# Patient Record
Sex: FEMALE | Race: WHITE | NOT HISPANIC OR LATINO | Employment: OTHER | URBAN - NONMETROPOLITAN AREA
[De-identification: names, ages, dates, MRNs, and addresses within clinical notes are randomized per-mention and may not be internally consistent; named-entity substitution may affect disease eponyms.]

---

## 2019-01-07 ENCOUNTER — HOSPITAL ENCOUNTER (EMERGENCY)
Facility: HOSPITAL | Age: 59
Discharge: HOME | End: 2019-01-07
Attending: EMERGENCY MEDICINE | Admitting: EMERGENCY MEDICINE

## 2019-01-07 VITALS
DIASTOLIC BLOOD PRESSURE: 69 MMHG | SYSTOLIC BLOOD PRESSURE: 108 MMHG | OXYGEN SATURATION: 97 % | BODY MASS INDEX: 31.5 KG/M2 | HEIGHT: 66 IN | RESPIRATION RATE: 14 BRPM | TEMPERATURE: 98.2 F | WEIGHT: 196 LBS | HEART RATE: 78 BPM

## 2019-01-07 DIAGNOSIS — M54.12 LEFT CERVICAL RADICULOPATHY: Primary | ICD-10-CM

## 2019-01-07 PROCEDURE — 63710000001 PREDNISONE PER 1 MG: Performed by: EMERGENCY MEDICINE

## 2019-01-07 PROCEDURE — 99283 EMERGENCY DEPT VISIT LOW MDM: CPT

## 2019-01-07 RX ORDER — HYDROCODONE BITARTRATE AND ACETAMINOPHEN 7.5; 325 MG/1; MG/1
1 TABLET ORAL ONCE
Status: COMPLETED | OUTPATIENT
Start: 2019-01-07 | End: 2019-01-07

## 2019-01-07 RX ORDER — PREDNISONE 20 MG/1
20 TABLET ORAL 2 TIMES DAILY
Qty: 14 TABLET | Refills: 0 | Status: SHIPPED | OUTPATIENT
Start: 2019-01-07 | End: 2019-01-07

## 2019-01-07 RX ORDER — HYDROCODONE BITARTRATE AND ACETAMINOPHEN 7.5; 325 MG/1; MG/1
1 TABLET ORAL EVERY 4 HOURS PRN
Qty: 15 TABLET | Refills: 0 | Status: SHIPPED | OUTPATIENT
Start: 2019-01-07 | End: 2019-01-07

## 2019-01-07 RX ORDER — PREDNISONE 20 MG/1
20 TABLET ORAL ONCE
Status: COMPLETED | OUTPATIENT
Start: 2019-01-07 | End: 2019-01-07

## 2019-01-07 RX ORDER — HYDROCODONE BITARTRATE AND ACETAMINOPHEN 7.5; 325 MG/1; MG/1
1 TABLET ORAL EVERY 4 HOURS PRN
Qty: 15 TABLET | Refills: 0 | Status: SHIPPED | OUTPATIENT
Start: 2019-01-07 | End: 2020-11-09

## 2019-01-07 RX ORDER — PREDNISONE 20 MG/1
20 TABLET ORAL 2 TIMES DAILY
Qty: 14 TABLET | Refills: 0 | Status: SHIPPED | OUTPATIENT
Start: 2019-01-07 | End: 2020-11-09

## 2019-01-07 RX ADMIN — PREDNISONE 20 MG: 20 TABLET ORAL at 21:09

## 2019-01-07 RX ADMIN — HYDROCODONE BITARTRATE AND ACETAMINOPHEN 1 TABLET: 7.5; 325 TABLET ORAL at 21:09

## 2019-01-08 NOTE — ED PROVIDER NOTES
Subjective   Patient c/o increasing pain in neck mostly along left side and going down left arm at times whenever she turns her neck.  Had MRI which showed bulging disk and referred to Dr. Quiñones but have not been given appointment yet for follow up so came tonight for relief.  Has had back surgery in past.        Neck Pain   Pain location:  L side  Quality:  Aching  Pain radiates to:  L shoulder and L arm  Pain severity:  Severe  Pain is:  Same all the time  Onset quality:  Gradual  Duration:  3 months  Timing:  Constant  Progression:  Worsening  Chronicity:  New  Context: not fall, not jumping from heights, not lifting a heavy object, not MCA, not MVC, not pedestrian accident and not recent injury    Relieved by:  Ice  Worsened by:  Twisting  Ineffective treatments:  None tried  Associated symptoms: no bladder incontinence, no bowel incontinence, no chest pain, no fever, no headaches, no leg pain, no numbness, no paresis, no photophobia, no syncope, no tingling, no visual change, no weakness and no weight loss    Risk factors: no hx of head and neck radiation, no hx of osteoporosis, no hx of spinal trauma, no recent epidural, no recent head injury and no recurrent falls        Review of Systems   Constitutional: Negative.  Negative for fever and weight loss.   HENT: Negative.    Eyes: Negative for photophobia.   Respiratory: Negative.    Cardiovascular: Negative.  Negative for chest pain and syncope.   Gastrointestinal: Negative.  Negative for bowel incontinence.   Genitourinary: Negative.  Negative for bladder incontinence.   Musculoskeletal: Positive for neck pain.   Neurological: Negative.  Negative for tingling, weakness, numbness and headaches.   Hematological: Negative.    Psychiatric/Behavioral: Negative.    All other systems reviewed and are negative.      No past medical history on file.    Allergies   Allergen Reactions   • Lisinopril Swelling   • Erythromycin Rash       No past surgical history on  file.    No family history on file.    Social History     Socioeconomic History   • Marital status:      Spouse name: Not on file   • Number of children: Not on file   • Years of education: Not on file   • Highest education level: Not on file           Objective   Physical Exam   Constitutional: She is oriented to person, place, and time. She appears well-developed and well-nourished.   HENT:   Head: Normocephalic and atraumatic.   Eyes: EOM are normal. Pupils are equal, round, and reactive to light.   Neck: Normal range of motion. Neck supple.   Tender along left side of neck.   Musculoskeletal: Normal range of motion.   Neurological: She is alert and oriented to person, place, and time.   Skin: Skin is warm and dry.   Psychiatric: She has a normal mood and affect. Her behavior is normal.   Nursing note and vitals reviewed.      Procedures           ED Course  ED Course as of Jan 07 2354   Mon Jan 07, 2019   2340 Told patient that her history is consistent with pinched nerve in her neck but there was nothing that we could do tonight to fix that except give her some temporary relief.  Stressed to her that ED could not be her pain management and to follow up with Dr. Quiñones's office to get appointment or with PCP for management until then.  Tried to Matt but was on manual process.  [TR]      ED Course User Index  [TR] Hunter Cottrell Jr., MD                  MDM  Number of Diagnoses or Management Options  Left cervical radiculopathy: established and worsening  Risk of Complications, Morbidity, and/or Mortality  Presenting problems: moderate  Diagnostic procedures: minimal  Management options: low    Patient Progress  Patient progress: stable        Final diagnoses:   Left cervical radiculopathy            Hunter Cottrell Jr., MD  01/07/19 4491

## 2019-03-07 ENCOUNTER — OFFICE VISIT (OUTPATIENT)
Dept: NEUROSURGERY | Facility: CLINIC | Age: 59
End: 2019-03-07

## 2019-03-07 ENCOUNTER — HOSPITAL ENCOUNTER (OUTPATIENT)
Dept: GENERAL RADIOLOGY | Facility: HOSPITAL | Age: 59
Discharge: HOME | End: 2019-03-07
Admitting: NURSE PRACTITIONER

## 2019-03-07 VITALS
WEIGHT: 195 LBS | DIASTOLIC BLOOD PRESSURE: 70 MMHG | HEIGHT: 67 IN | BODY MASS INDEX: 30.61 KG/M2 | SYSTOLIC BLOOD PRESSURE: 118 MMHG

## 2019-03-07 DIAGNOSIS — M25.512 ACUTE PAIN OF LEFT SHOULDER: ICD-10-CM

## 2019-03-07 DIAGNOSIS — M54.2 CERVICALGIA: ICD-10-CM

## 2019-03-07 DIAGNOSIS — E66.9 OBESITY (BMI 30.0-34.9): ICD-10-CM

## 2019-03-07 DIAGNOSIS — M54.2 CERVICALGIA: Primary | ICD-10-CM

## 2019-03-07 PROBLEM — E66.811 OBESITY (BMI 30.0-34.9): Status: ACTIVE | Noted: 2019-03-07

## 2019-03-07 PROCEDURE — 99204 OFFICE O/P NEW MOD 45 MIN: CPT | Performed by: NURSE PRACTITIONER

## 2019-03-07 PROCEDURE — 73030 X-RAY EXAM OF SHOULDER: CPT

## 2019-03-07 PROCEDURE — 72040 X-RAY EXAM NECK SPINE 2-3 VW: CPT

## 2019-03-07 RX ORDER — ATENOLOL 25 MG/1
50 TABLET ORAL
COMMUNITY
End: 2020-11-09

## 2019-03-07 RX ORDER — ATORVASTATIN CALCIUM 20 MG/1
20 TABLET, FILM COATED ORAL
COMMUNITY
End: 2020-11-09

## 2019-03-07 RX ORDER — METFORMIN HYDROCHLORIDE 500 MG/1
1000 TABLET, EXTENDED RELEASE ORAL 2 TIMES DAILY
COMMUNITY
End: 2021-08-09 | Stop reason: SDUPTHER

## 2019-03-07 RX ORDER — PREGABALIN 300 MG/1
300 CAPSULE ORAL 2 TIMES DAILY
COMMUNITY
End: 2021-05-10

## 2019-03-07 RX ORDER — TRAZODONE HYDROCHLORIDE 50 MG/1
150 TABLET ORAL
COMMUNITY
End: 2021-04-06 | Stop reason: SDUPTHER

## 2019-03-07 RX ORDER — OXYCODONE AND ACETAMINOPHEN 10; 325 MG/1; MG/1
1 TABLET ORAL EVERY 6 HOURS PRN
COMMUNITY

## 2019-03-07 RX ORDER — LIDOCAINE 50 MG/G
1 PATCH TOPICAL
COMMUNITY
End: 2020-11-09

## 2019-03-07 RX ORDER — METHOCARBAMOL 500 MG/1
750 TABLET, FILM COATED ORAL 4 TIMES DAILY
COMMUNITY
End: 2020-11-09

## 2019-03-07 RX ORDER — LOSARTAN POTASSIUM 50 MG/1
50 TABLET ORAL
COMMUNITY
End: 2020-11-09 | Stop reason: SDUPTHER

## 2019-03-07 RX ORDER — DULOXETIN HYDROCHLORIDE 60 MG/1
120 CAPSULE, DELAYED RELEASE ORAL
COMMUNITY

## 2019-03-07 NOTE — PATIENT INSTRUCTIONS
"DASH Eating Plan  DASH stands for \"Dietary Approaches to Stop Hypertension.\" The DASH eating plan is a healthy eating plan that has been shown to reduce high blood pressure (hypertension). It may also reduce your risk for type 2 diabetes, heart disease, and stroke. The DASH eating plan may also help with weight loss.  What are tips for following this plan?  General guidelines  · Avoid eating more than 2,300 mg (milligrams) of salt (sodium) a day. If you have hypertension, you may need to reduce your sodium intake to 1,500 mg a day.  · Limit alcohol intake to no more than 1 drink a day for nonpregnant women and 2 drinks a day for men. One drink equals 12 oz of beer, 5 oz of wine, or 1½ oz of hard liquor.  · Work with your health care provider to maintain a healthy body weight or to lose weight. Ask what an ideal weight is for you.  · Get at least 30 minutes of exercise that causes your heart to beat faster (aerobic exercise) most days of the week. Activities may include walking, swimming, or biking.  · Work with your health care provider or diet and nutrition specialist (dietitian) to adjust your eating plan to your individual calorie needs.  Reading food labels  · Check food labels for the amount of sodium per serving. Choose foods with less than 5 percent of the Daily Value of sodium. Generally, foods with less than 300 mg of sodium per serving fit into this eating plan.  · To find whole grains, look for the word \"whole\" as the first word in the ingredient list.  Shopping  · Buy products labeled as \"low-sodium\" or \"no salt added.\"  · Buy fresh foods. Avoid canned foods and premade or frozen meals.  Cooking  · Avoid adding salt when cooking. Use salt-free seasonings or herbs instead of table salt or sea salt. Check with your health care provider or pharmacist before using salt substitutes.  · Do not hayes foods. Cook foods using healthy methods such as baking, boiling, grilling, and broiling instead.  · Cook with " heart-healthy oils, such as olive, canola, soybean, or sunflower oil.  Meal planning    · Eat a balanced diet that includes:  ? 5 or more servings of fruits and vegetables each day. At each meal, try to fill half of your plate with fruits and vegetables.  ? Up to 6-8 servings of whole grains each day.  ? Less than 6 oz of lean meat, poultry, or fish each day. A 3-oz serving of meat is about the same size as a deck of cards. One egg equals 1 oz.  ? 2 servings of low-fat dairy each day.  ? A serving of nuts, seeds, or beans 5 times each week.  ? Heart-healthy fats. Healthy fats called Omega-3 fatty acids are found in foods such as flaxseeds and coldwater fish, like sardines, salmon, and mackerel.  · Limit how much you eat of the following:  ? Canned or prepackaged foods.  ? Food that is high in trans fat, such as fried foods.  ? Food that is high in saturated fat, such as fatty meat.  ? Sweets, desserts, sugary drinks, and other foods with added sugar.  ? Full-fat dairy products.  · Do not salt foods before eating.  · Try to eat at least 2 vegetarian meals each week.  · Eat more home-cooked food and less restaurant, buffet, and fast food.  · When eating at a restaurant, ask that your food be prepared with less salt or no salt, if possible.  What foods are recommended?  The items listed may not be a complete list. Talk with your dietitian about what dietary choices are best for you.  Grains  Whole-grain or whole-wheat bread. Whole-grain or whole-wheat pasta. Brown rice. Oatmeal. Quinoa. Bulgur. Whole-grain and low-sodium cereals. Trisha bread. Low-fat, low-sodium crackers. Whole-wheat flour tortillas.  Vegetables  Fresh or frozen vegetables (raw, steamed, roasted, or grilled). Low-sodium or reduced-sodium tomato and vegetable juice. Low-sodium or reduced-sodium tomato sauce and tomato paste. Low-sodium or reduced-sodium canned vegetables.  Fruits  All fresh, dried, or frozen fruit. Canned fruit in natural juice (without  added sugar).  Meat and other protein foods  Skinless chicken or turkey. Ground chicken or turkey. Pork with fat trimmed off. Fish and seafood. Egg whites. Dried beans, peas, or lentils. Unsalted nuts, nut butters, and seeds. Unsalted canned beans. Lean cuts of beef with fat trimmed off. Low-sodium, lean deli meat.  Dairy  Low-fat (1%) or fat-free (skim) milk. Fat-free, low-fat, or reduced-fat cheeses. Nonfat, low-sodium ricotta or cottage cheese. Low-fat or nonfat yogurt. Low-fat, low-sodium cheese.  Fats and oils  Soft margarine without trans fats. Vegetable oil. Low-fat, reduced-fat, or light mayonnaise and salad dressings (reduced-sodium). Canola, safflower, olive, soybean, and sunflower oils. Avocado.  Seasoning and other foods  Herbs. Spices. Seasoning mixes without salt. Unsalted popcorn and pretzels. Fat-free sweets.  What foods are not recommended?  The items listed may not be a complete list. Talk with your dietitian about what dietary choices are best for you.  Grains  Baked goods made with fat, such as croissants, muffins, or some breads. Dry pasta or rice meal packs.  Vegetables  Creamed or fried vegetables. Vegetables in a cheese sauce. Regular canned vegetables (not low-sodium or reduced-sodium). Regular canned tomato sauce and paste (not low-sodium or reduced-sodium). Regular tomato and vegetable juice (not low-sodium or reduced-sodium). Pickles. Olives.  Fruits  Canned fruit in a light or heavy syrup. Fried fruit. Fruit in cream or butter sauce.  Meat and other protein foods  Fatty cuts of meat. Ribs. Fried meat. Mcintyre. Sausage. Bologna and other processed lunch meats. Salami. Fatback. Hotdogs. Bratwurst. Salted nuts and seeds. Canned beans with added salt. Canned or smoked fish. Whole eggs or egg yolks. Chicken or turkey with skin.  Dairy  Whole or 2% milk, cream, and half-and-half. Whole or full-fat cream cheese. Whole-fat or sweetened yogurt. Full-fat cheese. Nondairy creamers. Whipped toppings.  Processed cheese and cheese spreads.  Fats and oils  Butter. Stick margarine. Lard. Shortening. Ghee. Mcintyre fat. Tropical oils, such as coconut, palm kernel, or palm oil.  Seasoning and other foods  Salted popcorn and pretzels. Onion salt, garlic salt, seasoned salt, table salt, and sea salt. Worcestershire sauce. Tartar sauce. Barbecue sauce. Teriyaki sauce. Soy sauce, including reduced-sodium. Steak sauce. Canned and packaged gravies. Fish sauce. Oyster sauce. Cocktail sauce. Horseradish that you find on the shelf. Ketchup. Mustard. Meat flavorings and tenderizers. Bouillon cubes. Hot sauce and Tabasco sauce. Premade or packaged marinades. Premade or packaged taco seasonings. Relishes. Regular salad dressings.  Where to find more information:  · National Heart, Lung, and Blood [place]: www.nhlbi.nih.gov  · American Heart Association: www.heart.org  Summary  · The DASH eating plan is a healthy eating plan that has been shown to reduce high blood pressure (hypertension). It may also reduce your risk for type 2 diabetes, heart disease, and stroke.  · With the DASH eating plan, you should limit salt (sodium) intake to 2,300 mg a day. If you have hypertension, you may need to reduce your sodium intake to 1,500 mg a day.  · When on the DASH eating plan, aim to eat more fresh fruits and vegetables, whole grains, lean proteins, low-fat dairy, and heart-healthy fats.  · Work with your health care provider or diet and nutrition specialist (dietitian) to adjust your eating plan to your individual calorie needs.  This information is not intended to replace advice given to you by your health care provider. Make sure you discuss any questions you have with your health care provider.  Document Released: 12/06/2012 Document Revised: 12/11/2017 Document Reviewed: 12/11/2017  [employer] Patient Education © 2018 [employer]

## 2019-03-07 NOTE — PROGRESS NOTES
Primary Care Provider: Paulie Rodriguez MD  Requesting Provider: Paulie Rodriguez MD    Chief Complaint:   Chief Complaint   Patient presents with   • Neck Pain   • Shoulder Pain     left     History of Present Illness  Consultation today at the request of Paulie Rodriguez MD     Soledad Ram is a 59 y.o.  female who presents today for a complaint of left neck/ trapezius and left shoulder pain.  Onset, October 2018, not related to any specific activity.  Denies injury.   Symptoms are currently intermittent in nature.  She described neck/ trapezius and shoulder discomfort as sharp non-radiating pain that worsens with left rotation and forward flexion, movement/ rotation of left arm at shoulder, and while lifting objects with left arm.  Alleviating factors include relaxation, Percocet and Robaxin which she obtains from Dr. Meier with pain management, and alternating applications of ice and heating pad.   She denies fevers, chills, night sweats, unexplained weight loss, paresthesias of the upper or lower extremities, burning dysesthesias, weakness of the left upper extremity, gait/ balance instabilities, saddle anesthesia, or bowel/ bladder dysfunction.  Currently rates severity of discomfort 6 / 10.  No additional concerns at this time.      Hx of prior L4/5 fusion in 2016 per Dr. Quiñones.  Ms. Ram has not completed a course of physician directed physical therapy.  Does not routinely engaged in chiropractic or massage therapy.   Routinely evaluated by Dr. Meier with pain management.      Review of Systems   Constitutional: Positive for activity change and fatigue.   HENT: Positive for ear pain and facial swelling.    Eyes: Positive for discharge and redness.   Respiratory: Negative.    Cardiovascular: Negative.    Gastrointestinal: Positive for constipation.   Endocrine: Positive for heat intolerance.   Genitourinary: Negative.    Musculoskeletal: Positive for back pain, neck pain and neck  "stiffness.   Skin: Negative.    Allergic/Immunologic: Negative.    Neurological: Positive for weakness and headaches.   Hematological: Negative.    Psychiatric/Behavioral: The patient is nervous/anxious.    All other systems reviewed and are negative.    Past Medical History:   Diagnosis Date   • Diabetes mellitus (CMS/HCC)    • Infection      Past Surgical History:   Procedure Laterality Date   • CARPAL TUNNEL RELEASE     • TONSILLECTOMY     • TUBAL ABDOMINAL LIGATION       Family History: family history is not on file.    Social History:  reports that  has never smoked. she has never used smokeless tobacco. She reports that she does not drink alcohol or use drugs.    Medications:    Current Outpatient Medications:   •  HYDROcodone-acetaminophen (NORCO) 7.5-325 MG per tablet, Take 1 tablet by mouth Every 4 (Four) Hours As Needed for Moderate Pain ., Disp: 15 tablet, Rfl: 0  •  predniSONE (DELTASONE) 20 MG tablet, Take 1 tablet by mouth 2 (Two) Times a Day., Disp: 14 tablet, Rfl: 0    Allergies:  Lisinopril and Erythromycin    Objective   /70   Ht 170.2 cm (67\")   Wt 88.5 kg (195 lb)   BMI 30.54 kg/m²   Physical Exam   Constitutional: She is oriented to person, place, and time. She appears well-developed and well-nourished.  Non-toxic appearance. She does not have a sickly appearance. She does not appear ill. No distress.   Eyes: EOM are normal. Pupils are equal, round, and reactive to light.   Musculoskeletal:        Left shoulder: She exhibits decreased range of motion, tenderness and pain. She exhibits no swelling, no crepitus and no deformity.   Tenderness with palpation to left trapezius and anterior left deltoid.  Decreased range of motion.  Positive drop arm test.  Positive empty can test.   Neurological: She is oriented to person, place, and time. Gait normal.   Reflex Scores:       Tricep reflexes are 2+ on the right side and 2+ on the left side.       Bicep reflexes are 2+ on the right side and 2+ " on the left side.       Brachioradialis reflexes are 2+ on the right side and 2+ on the left side.       Patellar reflexes are 2+ on the right side and 2+ on the left side.       Achilles reflexes are 2+ on the right side and 2+ on the left side.  Psychiatric: Her speech is normal.   Nursing note and vitals reviewed.    Neurologic Exam     Mental Status   Oriented to person, place, and time.   Attention: normal. Concentration: normal.   Speech: speech is normal   Level of consciousness: alert    Cranial Nerves     CN II   Visual fields full to confrontation.     CN III, IV, VI   Pupils are equal, round, and reactive to light.  Extraocular motions are normal.     CN V   Facial sensation intact.     CN VII   Facial expression full, symmetric.     CN VIII   CN VIII normal.     CN IX, X   CN IX normal.     CN XI   CN XI normal.     Motor Exam   Muscle bulk: normal  Overall muscle tone: normal  Right arm tone: normal  Left arm tone: normal  Right arm pronator drift: absent  Left arm pronator drift: absent  Right leg tone: normal  Left leg tone: normal    Strength   Right deltoid: 5/5  Left deltoid: 5/5  Right biceps: 5/5  Left biceps: 5/5  Right triceps: 5/5  Left triceps: 5/5  Right wrist extension: 5/5  Left wrist extension: 5/5  Right iliopsoas: 5/5  Left iliopsoas: 5/5  Right quadriceps: 5/5  Left quadriceps: 5/5  Right anterior tibial: 5/5  Left anterior tibial: 5/5  Right posterior tibial: 5/5  Left posterior tibial: 5/5    Sensory Exam   Light touch normal.     Gait, Coordination, and Reflexes     Gait  Gait: normal    Tremor   Resting tremor: absent  Intention tremor: absent  Action tremor: absent    Reflexes   Right brachioradialis: 2+  Left brachioradialis: 2+  Right biceps: 2+  Left biceps: 2+  Right triceps: 2+  Left triceps: 2+  Right patellar: 2+  Left patellar: 2+  Right achilles: 2+  Left achilles: 2+  Right : 2+  Left : 2+  Right plantar: normal  Left plantar: normal  Right Elmore: absent  Left  Elmore: absent  Right ankle clonus: absent  Left ankle clonus: absent  Right pendular knee jerk: absent  Left pendular knee jerk: absent    Oswestry Disability Index (Aparna et al, 1980)   Score   Pain Intensity 1   Personal Care 0   Lifting 4   Reading 2   Headaches 1   Concentration 0   Work 1   Driving 2   Sleeping 1   Recreation 2   TOTAL =  14     SCORE INTERPRETATION OF THE OSWESTRY NECK DISABILITY QUESTIONNAIRE  0-4: No disability  5-14: Mild disability   15-24: Moderate disability   25-34: Severe disability   >35: Complete disability    Imaging: (independent review and interpretation)                  ASSESSMENT and PLAN  Soledad Ram is a 59 y.o. female with a significant comorbidity of prior L4-5 fusion and obesity. She presents with a new problem of left neck/ trapezius and left shoulder pain. Physical exam findings of decreased range of motion the left shoulder, positive left drop arm, positive left empty can test, tenderness to palpation to left trapezius and left anterior deltoid.  Her imaging, x-ray of the cervical spine obtained October 27, 2018 shows no fractures, normal alignment, mild degenerative changes throughout.  MRI of the cervical spine obtained December 6, 2018 shows no acute fractures, normal alignment, degenerative changes throughout, C3-4: left foraminal stenosis due to facet hypertrophy, does not appear to show thecal sac or central canal stenosis.  C5-6: Small disc protrusion disc protrusion and facet hypertrophy, possible central canal and bilateral facet narrowing, however image quality is poor.  C6-7: Facet hypertrophy causing mild left foraminal narrowing, again possible central canal narrowing, poor image quality.      I would like to proceed today by obtaining x-rays of the cervical spine with flexion and extension to assess for cervical stability as well as x-rays of the left shoulder.  For first line conservative care of cervical neck pain, I would like to send Ms.  Leanna for a dedicated course of physician directed physical therapy consisting of 2-3 times a week for 3-4 weeks.  Nonsteroidal anti-inflammatories, Ibuprofen or naproxen sodium, and/or Tylenol per package instructions for pain.  B/R/AE and use discussed.  May continue pain medications per Dr. Meier's recommendations and and instructions.  In the interim I advised patient to follow-up with her PCP for potential referral to orthopedics for left shoulder pain.  Once all testing is complete we will follow-up with Ms. Ram and have  her see Dr. Aden for reassessment and to discuss the need for surgical intervention.  I advised the patient to call and return sooner for new or worsening complaints of weakness, paresthesias, gait disturbances, or any additional concerns.   BMI today is 30.5.  Information on the DASH diet provided in the AVS.  We will continue to provided diet and exercise information with the goal of weight loss at each scheduled appointment.  Treatment options discussed in detail with Soledad and she voiced understanding.  Mrs. Ram agrees with this plan of care.    Soledad was seen today for neck pain and shoulder pain.    Diagnoses and all orders for this visit:    Cervicalgia  -     XR spine cervical 2 vw; Future  -     Ambulatory Referral to Physical Therapy Evaluate and treat (3x a week for 3-4 weeks)    Acute pain of left shoulder  -     XR shoulder 2-3 vw left; Future  -     Ambulatory Referral to Physical Therapy Evaluate and treat (3x a week for 3-4 weeks)    Obesity (BMI 30.0-34.9)    Return in about 1 month (around 4/7/2019) for Next scheduled follow up with Don.    Thank you for this Consultation and the opportunity to participate in Soledad's care.    Sincerely,  Don Elizabeth, MARTY    Level of Risk: Moderate due to: undiagnosed new problem  MDM: Moderate Complexity  (Mod = 89385, High = 28523)

## 2019-03-20 ENCOUNTER — HOSPITAL ENCOUNTER (OUTPATIENT)
Dept: PHYSICAL THERAPY | Facility: HOSPITAL | Age: 59
Setting detail: THERAPIES SERIES
Discharge: HOME | End: 2019-03-20

## 2019-03-20 DIAGNOSIS — M54.2 CERVICALGIA: Primary | ICD-10-CM

## 2019-03-20 PROCEDURE — 97161 PT EVAL LOW COMPLEX 20 MIN: CPT | Performed by: PHYSICAL THERAPIST

## 2019-03-20 NOTE — THERAPY EVALUATION
Outpatient Physical Therapy Ortho Initial Evaluation  [facility]     Patient Name: Soledad Ram  : 1960  MRN: [id number]  Today's Date: 3/20/2019      Visit Date: 2019    Patient Active Problem List   Diagnosis   • Obesity (BMI 30.0-34.9)        Past Medical History:   Diagnosis Date   • Diabetes mellitus (CMS/HCC)    • Infection         Past Surgical History:   Procedure Laterality Date   • CARPAL TUNNEL RELEASE     • TONSILLECTOMY     • TUBAL ABDOMINAL LIGATION         Visit Dx:     ICD-10-CM ICD-9-CM   1. Cervicalgia M54.2 723.1         Patient History     Row Name 19 0930             History    Chief Complaint  Pain  -TB      Type of Pain  Neck pain left  -TB      Date Current Problem(s) Began  12/15/18  -TB      Brief Description of Current Complaint  She had an insidious onset of left neck pain going from her left neck to her shoulder. She has not had any injuries to her neck prior to this.   -TB      Patient/Caregiver Goals  Relieve pain;Return to prior level of function;Know what to do to help the symptoms  -TB      Patient's Rating of General Health  Good  -TB      Hand Dominance  right-handed  -TB      How has patient tried to help current problem?  heat/cold  -TB      What clinical tests have you had for this problem?  X-ray  -TB      Results of Clinical Tests  some degenerative changes in neck and shoulder but nothing acute or concerning  -TB         Pain     Pain Location  Neck  -TB      Pain at Present  5  -TB      Pain at Best  0  -TB      Pain at Worst  8  -TB      Pain Frequency  Intermittent 50% of the day  -TB      Pain Description  Stabbing  -TB      What Performance Factors Make the Current Problem(s) WORSE?  turning head  -TB      What Performance Factors Make the Current Problem(s) BETTER?  resting and reclining in neutral  -TB         Fall Risk Assessment    Any falls in the past year:  No  -TB         Services    Prior Rehab/Home Health Experiences  Yes  -TB       When was the prior experience with Rehab/Home Health  2016  -TB      Where was the prior experience with Rehab/Home Health  [facility]  -TB         Daily Activities    Primary Language  English  -TB      Are you able to read  Yes  -TB      Are you able to write  Yes  -TB      How does patient learn best?  Demonstration  -TB      Teaching needs identified  Home Exercise Program;Management of Condition  -TB      Patient is concerned about/has problems with  Performing home management (household chores, shopping, care of dependents);Difficulty with self care (i.e. bathing, dressing, toileting:  -TB      Does patient have problems with the following?  Depression;Anxiety  -TB      Barriers to learning  None  -TB      Pt Participated in POC and Goals  Yes  -TB         Safety    Are you being hurt, hit, or frightened by anyone at home or in your life?  No  -TB      Are you being neglected by a caregiver  No  -TB        User Key  (r) = Recorded By, (t) = Taken By, (c) = Cosigned By    Initials Name Provider Type    TB Vinnie Moreno, PT Physical Therapist          PT Ortho     Row Name 03/20/19 0900       Posture/Observations    Posture/Observations Comments  forward head posture with rounded shoulders  -TB       Quarter Clearing    Quarter Clearing  Upper Quarter Clearing  -TB       Sensory Screen for Light Touch- Upper Quarter Clearing    C4 (posterior shoulder)  Intact  -TB    C5 (lateral upper arm)  Intact  -TB    C6 (tip of thumb)  Intact  -TB    C7 (tip of 3rd finger)  Intact  -TB    C8 (tip of 5th finger)  Intact  -TB    T1 (medial lower arm)  Intact  -TB       Myotomal Screen- Upper Quarter Clearing    Shoulder flexion (C5)  Right:;WNL;Left:;4 (Good)  -TB    Elbow flexion/wrist extension (C6)  WNL  -TB    Elbow extension/wrist flexion (C7)  WNL  -TB    Finger flexion/ (C8)  WNL  -TB    Finger abduction (T1)  WNL  -TB      WNL  -TB       Cervical/Shoulder ROM Screen    Cervical flexion  Impaired  75%  -TB    Cervical extension  Impaired 25% with left pain  -TB    Cervical rotation  Impaired RR: 75% left pain; LR: 75% with left pain  -TB       Special Tests/Palpation    Special Tests/Palpation  Cervical/Thoracic;Shoulder  -TB       Cervical Palpation    Cervical Palpation- Location?  Occiput;AC joint;Upper traps;Suboccipital;Cervical facets;Levator scapula  -TB       Cervical Accessory Motions    Cervical Accessory Motions Tested?  Yes  -TB    Sideglide- C1  WNL  -TB    Sideglide- C2  WNL  -TB    Sideglide- C3  WNL  -TB    Sideglide- C4  Left:;Hypomobile;Left pain;Right:;WNL  -TB    Sideglide- C5  WNL  -TB    Sideglide- C6  WNL  -TB    Sideglide- C7  WNL  -TB       Thoracic Accessory Motions    Thoracic Accessory Motions Tested?  Yes  -TB    Pa glide- Upper thoracic  Hypomobile  -TB    Pa glide- Middle thoracic  Hypomobile  -TB       Rib Mobility    Rib Mobility Accessory Motions Tested?  Yes  -TB    Rib Mobility- T1  Right:;WNL;Left:;Hypomobile;Left pain  -TB       Cervical/Thoracic Special Tests    Spurlings (Foraminal Compression)  Left: painful, no radicular  -TB    Cervical Distraction (Foraminal Compression vs. Facet Pain)  Left: relieved pain  -TB       Shoulder Girdle Accessory Motions    Shoulder Girdle Accessory Motions Tested?  Yes  -TB    A-P glide of AC joint  Left pain  -TB       Shoulder Impingement/Rotator Cuff Special Tests    Quintanilla-Mark Test (RC Lesion vs. Bursitis)  Left:;Positive  -TB    Neer Impingement Test (RC Lesion vs. Bursitis)  Left:;Positive  -TB       General ROM    RT Upper Ext  Rt Shoulder Flexion  -TB    LT Upper Ext  Lt Shoulder Flexion  -TB       Right Upper Ext    Rt Shoulder Flexion AROM  150 deg  -TB       Left Upper Ext    Lt Shoulder Flexion AROM  130 deg with pain  -TB      User Key  (r) = Recorded By, (t) = Taken By, (c) = Cosigned By    Initials Name Provider Type    TB Vinnie Moreno, PT Physical Therapist                  [unfilled]    Therapy  Education  Education Details: gentle cervical rotation and posture  Given: HEP, Symptoms/condition management, Mobility training  Program: New  How Provided: Verbal, Demonstration  Provided to: Patient  Level of Understanding: Teach back education performed, Verbalized, Demonstrated     PT OP Goals     Row Name 03/20/19 0900          Long Term Goals    LTG Date to Achieve  05/01/19  -TB     LTG 1  Neck Disability Index <10%  -TB     LTG 1 Progress  New  -TB     LTG 2  Symmetrical saúl cervical rotation without impingement  -TB     LTG 2 Progress  New  -TB     LTG 3  Independent with HEP for posture and flexibility  -TB     LTG 3 Progress  New  -TB       User Key  (r) = Recorded By, (t) = Taken By, (c) = Cosigned By    Initials Name Provider Type    TB Vinnie Moreno, PT Physical Therapist          PT Assessment/Plan     Row Name 03/20/19 1000          PT Assessment    Functional Limitations  Limitation in home management;Limitations in functional capacity and performance;Performance in leisure activities  -TB     Impairments  Pain;Range of motion;Joint mobility  -TB     Assessment Comments  Her primary problem appears to be a stuck facet joint around C3/4. She also has some left shoulder pain which may have limited elevation putting more stress onto left neck. I anticipate she can do very well, especially if we can release the stuck facet quickly.   -TB     Please refer to paper survey for additional self-reported information  Yes  -TB     Rehab Potential  Excellent  -TB     Patient/caregiver participated in establishment of treatment plan and goals  Yes  -TB     Patient would benefit from skilled therapy intervention  Yes  -TB        PT Plan    PT Frequency  2x/week;3x/week  -TB     Predicted Duration of Therapy Intervention (Therapy Eval)  3-4 weeks  -TB     Planned CPT's?  PT EVAL LOW COMPLEXITY: 47364;PT THER PROC EA 15 MIN: 82912;PT MANUAL THERAPY EA 15 MIN: 41164;PT ELECTRICAL STIM UNATTEND: ;PT  ELECTRICAL STIM ATTD EA 15 MIN: 40028;PT ULTRASOUND EA 15 MIN: 68622  -TB     PT Plan Comments  Focus on manual therapy to relax guarded muscles on her left neck and improve gliding and closing of her left mid cervical facet. Some muscle energy may be used to help nudge the segment into moving again. We will progress then with a HEP for improved posture to try to prevent this from reoccuring.   -TB       User Key  (r) = Recorded By, (t) = Taken By, (c) = Cosigned By    Initials Name Provider Type    TB Vinnie Moreno, PT Physical Therapist                 Manual Rx (last 36 hours)      Manual Treatments     Row Name 03/20/19 0930             Manual Rx 1    Manual Rx 1 Location  cervical manual traction   -TB      Manual Rx 1 Type  in neutral and right SB  -TB      Manual Rx 1 Duration  10  -TB         Manual Rx 2    Manual Rx 2 Location  left C3/4 facet gapping mob  -TB      Manual Rx 2 Type  right SB with left rotation  -TB      Manual Rx 2 Grade  gentle repetitive  -TB         Manual Rx 3    Manual Rx 3 Location  manual SNAG for upgliding left mid cervical  -TB      Manual Rx 3 Type  with STM left mid cervical  -TB         Manual Rx 4    Manual Rx 4 Location  MET for left rotation mid cervical  -TB      Manual Rx 4 Type  left SB down to impingement  -TB      Manual Rx 4 Grade  manual resisted isometric right rotation with OP at C3  -TB      Manual Rx 4 Duration  felt a cavitation with this  -TB        User Key  (r) = Recorded By, (t) = Taken By, (c) = Cosigned By    Initials Name Provider Type    Vinnie Shaikh, PT Physical Therapist                      Outcome Measure Options: Neck Disability Index (NDI)  Neck Disability Index  Section 1 - Pain Intensity: The pain is moderate at the moment.  Section 2 - Personal Care: I can look after myself normally, but it causes extra pain.  Section 3 - Lifting: Pain prevents me from lifting heavy weights off the floor but I can manage if items are conveniently  positioned, ie. on a table.  Section 4 - Work: I can do most of my usual work, but no more  Section 5 - Headaches: I have moderate headaches that come infrequently.  Section 6 - Concentration: I can concentrate fully with slight difficulty.  Section 7 - Sleeping: My sleep is moderately disturbed for up to 2-3 hours.  Section 8 - Driving: I can drive as long as I want with moderate neck pain.  Section 9 - Reading: I can read as much as I want with moderate neck pain.  Section 10 - Recreation: I have some neck pain with a few recreational activities.  Neck Disability Index Score: 19      Time Calculation:     Start Time: 0930  Stop Time: 1030  Time Calculation (min): 60 min     Therapy Charges for Today     Code Description Service Date Service Provider Modifiers Qty    11830788303 HC PT EVAL LOW COMPLEXITY 4 3/20/2019 Vinnie Moreno, PT GP 1          PT G-Codes  Outcome Measure Options: Neck Disability Index (NDI)  Neck Disability Index Score: 19         Vinnie Moreno, PT  3/20/2019

## 2019-03-22 ENCOUNTER — HOSPITAL ENCOUNTER (OUTPATIENT)
Dept: PHYSICAL THERAPY | Facility: HOSPITAL | Age: 59
Setting detail: THERAPIES SERIES
Discharge: HOME | End: 2019-03-22

## 2019-03-22 DIAGNOSIS — M54.2 CERVICALGIA: Primary | ICD-10-CM

## 2019-03-22 PROCEDURE — 97140 MANUAL THERAPY 1/> REGIONS: CPT

## 2019-03-22 NOTE — THERAPY TREATMENT NOTE
Outpatient Physical Therapy Ortho Treatment Note  [facility]     Patient Name: Soledad Ram  : 1960  MRN: [id number]  Today's Date: 3/22/2019      Visit Date: 2019    Visit Dx:    ICD-10-CM ICD-9-CM   1. Cervicalgia M54.2 723.1       Patient Active Problem List   Diagnosis   • Obesity (BMI 30.0-34.9)        Past Medical History:   Diagnosis Date   • Diabetes mellitus (CMS/HCC)    • Infection         Past Surgical History:   Procedure Laterality Date   • CARPAL TUNNEL RELEASE     • TONSILLECTOMY     • TUBAL ABDOMINAL LIGATION         PT Ortho     Row Name 19 0900       Posture/Observations    Posture/Observations Comments  forward head posture with rounded shoulders  -TB       Quarter Clearing    Quarter Clearing  Upper Quarter Clearing  -TB       Sensory Screen for Light Touch- Upper Quarter Clearing    C4 (posterior shoulder)  Intact  -TB    C5 (lateral upper arm)  Intact  -TB    C6 (tip of thumb)  Intact  -TB    C7 (tip of 3rd finger)  Intact  -TB    C8 (tip of 5th finger)  Intact  -TB    T1 (medial lower arm)  Intact  -TB       Myotomal Screen- Upper Quarter Clearing    Shoulder flexion (C5)  Right:;WNL;Left:;4 (Good)  -TB    Elbow flexion/wrist extension (C6)  WNL  -TB    Elbow extension/wrist flexion (C7)  WNL  -TB    Finger flexion/ (C8)  WNL  -TB    Finger abduction (T1)  WNL  -TB      WNL  -TB       Cervical/Shoulder ROM Screen    Cervical flexion  Impaired 75%  -TB    Cervical extension  Impaired 25% with left pain  -TB    Cervical rotation  Impaired RR: 75% left pain; LR: 75% with left pain  -TB       Special Tests/Palpation    Special Tests/Palpation  Cervical/Thoracic;Shoulder  -TB       Cervical Palpation    Cervical Palpation- Location?  Occiput;AC joint;Upper traps;Suboccipital;Cervical facets;Levator scapula  -TB       Cervical Accessory Motions    Cervical Accessory Motions Tested?  Yes  -TB    Sideglide- C1  WNL  -TB    Sideglide- C2  WNL  -TB    Sideglide- C3   WNL  -TB    Sideglide- C4  Left:;Hypomobile;Left pain;Right:;WNL  -TB    Sideglide- C5  WNL  -TB    Sideglide- C6  WNL  -TB    Sideglide- C7  WNL  -TB       Thoracic Accessory Motions    Thoracic Accessory Motions Tested?  Yes  -TB    Pa glide- Upper thoracic  Hypomobile  -TB    Pa glide- Middle thoracic  Hypomobile  -TB       Rib Mobility    Rib Mobility Accessory Motions Tested?  Yes  -TB    Rib Mobility- T1  Right:;WNL;Left:;Hypomobile;Left pain  -TB       Cervical/Thoracic Special Tests    Spurlings (Foraminal Compression)  Left: painful, no radicular  -TB    Cervical Distraction (Foraminal Compression vs. Facet Pain)  Left: relieved pain  -TB       Shoulder Girdle Accessory Motions    Shoulder Girdle Accessory Motions Tested?  Yes  -TB    A-P glide of AC joint  Left pain  -TB       Shoulder Impingement/Rotator Cuff Special Tests    Quintanilla-Mark Test (RC Lesion vs. Bursitis)  Left:;Positive  -TB    Neer Impingement Test (RC Lesion vs. Bursitis)  Left:;Positive  -TB       General ROM    RT Upper Ext  Rt Shoulder Flexion  -TB    LT Upper Ext  Lt Shoulder Flexion  -TB       Right Upper Ext    Rt Shoulder Flexion AROM  150 deg  -TB       Left Upper Ext    Lt Shoulder Flexion AROM  130 deg with pain  -TB      User Key  (r) = Recorded By, (t) = Taken By, (c) = Cosigned By    Initials Name Provider Type    TB Vinnie Moreno, PT Physical Therapist                      PT Assessment/Plan     Row Name 03/22/19 0935          PT Assessment    Assessment Comments  Today was pt's first visit since inital evaluation therefore no goasl have been achieved at this time. Todays treatment focused on manual therapy to relax gaurded muscles in her cervical paraspinals and improve cervical and thoracic mobiltiy. She tolerated all components of treatment well without any increase in pain and had decreased pain post session.   -TR        PT Plan    PT Plan Comments  Continue as symptoms allow.   -TR       User Key  (r) = Recorded  By, (t) = Taken By, (c) = Cosigned By    Initials Name Provider Type    TR NiaKiana duke, MARTY Physical Therapy Assistant            Exercises     Row Name 03/22/19 0935 03/22/19 0835          Subjective Comments    Subjective Comments  Pt reports feeling good after less session with some soreness, but reports feeling like she has improved already.   -TR  --  -TR        Subjective Pain    Able to rate subjective pain?  yes  -TR  --  -TR     Pre-Treatment Pain Level  5  -TR  --  -TR     Subjective Pain Comment  L side of her neck   -TR  --  -TR        Total Minutes    67828 - PT Therapeutic Exercise Minutes  4  -TR  --     74103 - PT Manual Therapy Minutes  38  -TR  --  -TR        Exercise 1    Exercise Name 1  Seated working on B cervical rotation.   -TR  --  -TR     Cueing 1  Verbal;Tactile  -TR  --  -TR       User Key  (r) = Recorded By, (t) = Taken By, (c) = Cosigned By    Initials Name Provider Type    FLOWER BenitoKiana duke PTA Physical Therapy Assistant                      Manual Rx (last 36 hours)      Manual Treatments     Row Name 03/22/19 0935 03/22/19 0835          Total Minutes    13240 - PT Manual Therapy Minutes  38  -TR  --  -TR        Manual Rx 1    Manual Rx 1 Location  Cervical paraspinals  -TR  --     Manual Rx 1 Type  STM in hooklyign with bolster under knees  -TR  --     Manual Rx 1 Grade  min  -TR  --     Manual Rx 1 Duration  10  -TR  --        Manual Rx 2    Manual Rx 2 Location  Cervical   -TR  --     Manual Rx 2 Type  Manual traction in neutral and in R SB  -TR  --     Manual Rx 2 Grade  min  -TR  --     Manual Rx 2 Duration  10  -TR  --        Manual Rx 3    Manual Rx 3 Location  L upper cervical   -TR  --     Manual Rx 3 Type  side glides  -TR  --     Manual Rx 3 Grade  min  -TR  --     Manual Rx 3 Duration  5  -TR  --        Manual Rx 4    Manual Rx 4 Location  Thoracic paraspinals  -TR  --     Manual Rx 4 Type  IASTM with foam roller  -TR  --     Manual Rx 4 Grade  min  -TR  --      Manual Rx 4 Duration  8  -TR  --        Manual Rx 5    Manual Rx 5 Location  Upper thoracic and CT junction  -TR  --     Manual Rx 5 Type  Extension mobilizations  -TR  --     Manual Rx 5 Grade  1-2  -TR  --     Manual Rx 5 Duration  6  -TR  --       User Key  (r) = Recorded By, (t) = Taken By, (c) = Cosigned By    Initials Name Provider Type    Kiana Murillo PTA Physical Therapy Assistant          PT OP Goals     Row Name 03/22/19 0935          Long Term Goals    LTG Date to Achieve  05/01/19  -TR     LTG 1  Neck Disability Index <10%  -TR     LTG 1 Progress  Ongoing  -TR     LTG 2  Symmetrical saúl cervical rotation without impingement  -TR     LTG 2 Progress  Ongoing  -TR     LTG 3  Independent with HEP for posture and flexibility  -TR     LTG 3 Progress  Ongoing  -TR        Time Calculation    PT Goal Re-Cert Due Date  04/19/19  -TR       User Key  (r) = Recorded By, (t) = Taken By, (c) = Cosigned By    Initials Name Provider Type    Kiana Murillo PTA Physical Therapy Assistant          Therapy Education  Education Details: Gentle cervical rotation in seated position with arms unweighted   Given: HEP, Symptoms/condition management  Program: New  How Provided: Verbal, Demonstration  Provided to: Patient  Level of Understanding: Verbalized, Demonstrated              Time Calculation:   Start Time: 0935  Stop Time: 1017  Time Calculation (min): 42 min  Total Timed Code Minutes- PT: 42 minute(s)  Therapy Charges for Today     Code Description Service Date Service Provider Modifiers Qty    75060963801 HC PT MANUAL THERAPY EA 15 MIN 3/22/2019 Kiana Kramer PTA GP 3                    Kiana Kramer PTA  3/22/2019

## 2019-03-25 ENCOUNTER — HOSPITAL ENCOUNTER (OUTPATIENT)
Dept: PHYSICAL THERAPY | Facility: HOSPITAL | Age: 59
Setting detail: THERAPIES SERIES
Discharge: HOME | End: 2019-03-25

## 2019-03-25 DIAGNOSIS — M54.2 CERVICALGIA: Primary | ICD-10-CM

## 2019-03-25 PROCEDURE — 97140 MANUAL THERAPY 1/> REGIONS: CPT

## 2019-03-25 PROCEDURE — 97110 THERAPEUTIC EXERCISES: CPT

## 2019-03-25 NOTE — THERAPY TREATMENT NOTE
"    Outpatient Physical Therapy Ortho Treatment Note  [facility]     Patient Name: Soledad Ram  : 1960  MRN: [id number]  Today's Date: 3/25/2019      Visit Date: 2019    Visit Dx:    ICD-10-CM ICD-9-CM   1. Cervicalgia M54.2 723.1       Patient Active Problem List   Diagnosis   • Obesity (BMI 30.0-34.9)        Past Medical History:   Diagnosis Date   • Diabetes mellitus (CMS/HCC)    • Infection         Past Surgical History:   Procedure Laterality Date   • CARPAL TUNNEL RELEASE     • TONSILLECTOMY     • TUBAL ABDOMINAL LIGATION                         PT Assessment/Plan     Row Name 19 1105          PT Assessment    Assessment Comments  I believe pt may have \"unstuck\" her facet while stretching over the weekend. She is feeling much better regaurding pain and has improved ROM. Her Cervical rotation was within functional limits today although still decreased on L vs R. Her thoracic mobility was symettrical on both side as well. Started with gentle scapualr strengthening today which she tolerated without any increase in pain.   -TR        PT Plan    PT Plan Comments  Continue to monitor symptoms and progress with strengthening.   -TR       User Key  (r) = Recorded By, (t) = Taken By, (c) = Cosigned By    Initials Name Provider Type    TR Kiana Kramer, MARTY Physical Therapy Assistant            Exercises     Row Name 19 8993             Subjective Comments    Subjective Comments  Pt reports not feeling well over the weekend. She reports doing her stretch exercises in seated position and she felt a \"pop\" on the L side of her neck. She reports that now the pain has went away and she is feeling better and she can move it more. She reports taking soem medicine and putting a heating pad on it but it is feeling much better today.   -TR         Subjective Pain    Able to rate subjective pain?  yes  -TR      Pre-Treatment Pain Level  2  -TR      Post-Treatment Pain Level  2  -TR      " "Subjective Pain Comment  L side of her neck.   -TR         Total Minutes    40436 - PT Therapeutic Exercise Minutes  12  -TR      99104 - PT Manual Therapy Minutes  30  -TR         Exercise 1    Exercise Name 1  Prone \"I's\"   -TR      Cueing 1  Verbal  -TR      Sets 1  1  -TR      Reps 1  10  -TR         Exercise 2    Exercise Name 2  Prone Rows  -TR      Cueing 2  Verbal  -TR      Sets 2  2  -TR      Reps 2  10  -TR         Exercise 3    Exercise Name 3  Assesed cervical and thoracic ROM   -TR        User Key  (r) = Recorded By, (t) = Taken By, (c) = Cosigned By    Initials Name Provider Type    TR Kiana Kramer PTA Physical Therapy Assistant                      Manual Rx (last 36 hours)      Manual Treatments     Row Name 03/25/19 1103             Total Minutes    45345 - PT Manual Therapy Minutes  30  -TR         Manual Rx 1    Manual Rx 1 Location  Cervical paraspinals  -TR      Manual Rx 1 Type  STM in hooklyign with bolster under knees  -TR      Manual Rx 1 Grade  min  -TR      Manual Rx 1 Duration  5  -TR         Manual Rx 2    Manual Rx 2 Location  Cervical   -TR      Manual Rx 2 Type  Manual traction in neutral and in R SB  -TR      Manual Rx 2 Grade  min  -TR      Manual Rx 2 Duration  5  -TR         Manual Rx 3    Manual Rx 3 Location  L upper cervical   -TR      Manual Rx 3 Type  side glides  -TR      Manual Rx 3 Grade  min  -TR      Manual Rx 3 Duration  5  -TR         Manual Rx 4    Manual Rx 4 Location  Thoracic paraspinals  -TR      Manual Rx 4 Type  IASTM with foam roller  -TR      Manual Rx 4 Grade  min  -TR      Manual Rx 4 Duration  10  -TR         Manual Rx 5    Manual Rx 5 Location  Upper thoracic and CT junction  -TR      Manual Rx 5 Type  Extension mobilizations  -TR      Manual Rx 5 Grade  1-2  -TR      Manual Rx 5 Duration  5  -TR        User Key  (r) = Recorded By, (t) = Taken By, (c) = Cosigned By    Initials Name Provider Type    TR Kiana Kramer PTA Physical Therapy " Assistant          PT OP Goals     Row Name 03/25/19 1103          Long Term Goals    LTG Date to Achieve  05/01/19  -TR     LTG 1  Neck Disability Index <10%  -TR     LTG 1 Progress  Ongoing  -TR     LTG 2  Symmetrical saúl cervical rotation without impingement  -TR     LTG 2 Progress  Ongoing  -TR     LTG 2 Progress Comments  slighly decreased on L today vs R; however both WFL   -TR     LTG 3  Independent with HEP for posture and flexibility  -TR     LTG 3 Progress  Ongoing  -TR        Time Calculation    PT Goal Re-Cert Due Date  04/19/19  -TR       User Key  (r) = Recorded By, (t) = Taken By, (c) = Cosigned By    Initials Name Provider Type    TR Kiana Kramer PTA Physical Therapy Assistant          Therapy Education  Given: HEP, Symptoms/condition management  Program: Reinforced  How Provided: Verbal  Provided to: Patient  Level of Understanding: Verbalized              Time Calculation:   Start Time: 1103  Stop Time: 1145  Time Calculation (min): 42 min  Total Timed Code Minutes- PT: 42 minute(s)  Therapy Charges for Today     Code Description Service Date Service Provider Modifiers Qty    20368079804 HC PT THER PROC EA 15 MIN 3/25/2019 Kiana Kramer PTA GP 1    22392873220 HC PT MANUAL THERAPY EA 15 MIN 3/25/2019 Kiana Kramer PTA GP 2                    Kiana Kramer PTA  3/25/2019

## 2019-03-29 ENCOUNTER — HOSPITAL ENCOUNTER (OUTPATIENT)
Dept: PHYSICAL THERAPY | Facility: HOSPITAL | Age: 59
Setting detail: THERAPIES SERIES
Discharge: HOME | End: 2019-03-29

## 2019-03-29 DIAGNOSIS — M54.2 CERVICALGIA: Primary | ICD-10-CM

## 2019-03-29 PROCEDURE — 97140 MANUAL THERAPY 1/> REGIONS: CPT

## 2019-03-29 PROCEDURE — 97110 THERAPEUTIC EXERCISES: CPT

## 2019-03-29 NOTE — THERAPY TREATMENT NOTE
Outpatient Physical Therapy Ortho Treatment Note  [facility]     Patient Name: Soledad Ram  : 1960  MRN: [id number]  Today's Date: 3/29/2019      Visit Date: 2019    Visit Dx:    ICD-10-CM ICD-9-CM   1. Cervicalgia M54.2 723.1       Patient Active Problem List   Diagnosis   • Obesity (BMI 30.0-34.9)        Past Medical History:   Diagnosis Date   • Diabetes mellitus (CMS/HCC)    • Infection         Past Surgical History:   Procedure Laterality Date   • CARPAL TUNNEL RELEASE     • TONSILLECTOMY     • TUBAL ABDOMINAL LIGATION                         PT Assessment/Plan     Row Name 19 0933          PT Assessment    Assessment Comments  Pt was sstill doing well today even after being sick. She continues to report improvement overall. Her thoracic mobility was improved today but she was tender along her upper thoracic paraspinals today. Continued with scapular strengthening; however did not bolster HEP.   -TR        PT Plan    PT Plan Comments  Progress with gentle strengthening and bolster HEP   -TR       User Key  (r) = Recorded By, (t) = Taken By, (c) = Cosigned By    Initials Name Provider Type    TR Kiana Kramer, MARTY Physical Therapy Assistant            Exercises     Row Name 19 0926             Subjective Comments    Subjective Comments  She reports being very sick and that was makingn nher very sore but she is feeling much better now.   -TR         Subjective Pain    Able to rate subjective pain?  yes  -TR      Pre-Treatment Pain Level  0  -TR      Post-Treatment Pain Level  0  -TR         Total Minutes    43143 - PT Therapeutic Exercise Minutes  17  -TR      82082 - PT Manual Therapy Minutes  25  -TR         Exercise 1    Exercise Name 1  Horizontal abduction against yellow band in supine   -TR      Cueing 1  Verbal  -TR      Sets 1  3  -TR      Reps 1  15  -TR         Exercise 2    Exercise Name 2  Seated scapular retraction against red band   -TR      Cueing 2  Verbal   -TR      Sets 2  1  -TR      Reps 2  15  -TR         Exercise 3    Exercise Name 3  Thoracic 1/2 foam roller stretch  -TR      Cueing 3  Verbal  -TR      Time 3  3 minutes   -TR        User Key  (r) = Recorded By, (t) = Taken By, (c) = Cosigned By    Initials Name Provider Type    TR Kiana Kramer PTA Physical Therapy Assistant                      Manual Rx (last 36 hours)      Manual Treatments     Row Name 03/29/19 0933             Total Minutes    41163 - PT Manual Therapy Minutes  25  -TR         Manual Rx 1    Manual Rx 1 Location  Thoracic paraspinals   -TR      Manual Rx 1 Type  IASTM with pink foam roller   -TR      Manual Rx 1 Grade  mod  -TR      Manual Rx 1 Duration  13  -TR         Manual Rx 2    Manual Rx 2 Location  Upper thoracic and CT junction   -TR      Manual Rx 2 Type  extension mobilizations in prone.   -TR      Manual Rx 2 Grade  2  -TR      Manual Rx 2 Duration  10  -TR         Manual Rx 3    Manual Rx 3 Location  L UT   -TR      Manual Rx 3 Type  STM in prone   -TR      Manual Rx 3 Grade  mod  -TR      Manual Rx 3 Duration  2 min   -TR        User Key  (r) = Recorded By, (t) = Taken By, (c) = Cosigned By    Initials Name Provider Type    TR Kiana Kramer PTA Physical Therapy Assistant          PT OP Goals     Row Name 03/29/19 0933          Long Term Goals    LTG Date to Achieve  05/01/19  -TR     LTG 1  Neck Disability Index <10%  -TR     LTG 1 Progress  Ongoing  -TR     LTG 2  Symmetrical saúl cervical rotation without impingement  -TR     LTG 2 Progress  Ongoing  -TR     LTG 3  Independent with HEP for posture and flexibility  -TR     LTG 3 Progress  Ongoing  -TR     LTG 3 Progress Comments  Progressed today with scapualr strengthening to improve posture   -TR        Time Calculation    PT Goal Re-Cert Due Date  04/19/19  -TR       User Key  (r) = Recorded By, (t) = Taken By, (c) = Cosigned By    Initials Name Provider Type    Kiana Murillo PTA Physical Therapy  Assistant          Therapy Education  Education Details: B horizontal abduction with yellow band   Given: HEP, Symptoms/condition management  Program: New  How Provided: Verbal, Demonstration, Written  Provided to: Patient  Level of Understanding: Verbalized, Demonstrated              Time Calculation:   Start Time: 0933  Stop Time: 1015  Time Calculation (min): 42 min  Total Timed Code Minutes- PT: 42 minute(s)  Therapy Charges for Today     Code Description Service Date Service Provider Modifiers Qty    92699252513 HC PT THER PROC EA 15 MIN 3/29/2019 Kiana Kramer, MARTY GP 1    87177969560 HC PT MANUAL THERAPY EA 15 MIN 3/29/2019 Kiana Kramer, MARTY GP 2                    Kiana Kramer PTA  3/29/2019

## 2019-04-03 ENCOUNTER — HOSPITAL ENCOUNTER (OUTPATIENT)
Dept: PHYSICAL THERAPY | Facility: HOSPITAL | Age: 59
Setting detail: THERAPIES SERIES
Discharge: HOME | End: 2019-04-03

## 2019-04-03 DIAGNOSIS — M54.2 CERVICALGIA: Primary | ICD-10-CM

## 2019-04-03 PROCEDURE — 97140 MANUAL THERAPY 1/> REGIONS: CPT

## 2019-04-03 PROCEDURE — 97110 THERAPEUTIC EXERCISES: CPT

## 2019-04-03 NOTE — THERAPY TREATMENT NOTE
Outpatient Physical Therapy Ortho Treatment Note  [facility]     Patient Name: Soledad Ram  : 1960  MRN: [id number]  Today's Date: 4/3/2019      Visit Date: 2019    Visit Dx:    ICD-10-CM ICD-9-CM   1. Cervicalgia M54.2 723.1       Patient Active Problem List   Diagnosis   • Obesity (BMI 30.0-34.9)        Past Medical History:   Diagnosis Date   • Diabetes mellitus (CMS/HCC)    • Infection         Past Surgical History:   Procedure Laterality Date   • CARPAL TUNNEL RELEASE     • TONSILLECTOMY     • TUBAL ABDOMINAL LIGATION                         PT Assessment/Plan     Row Name 19 8407          PT Assessment    Assessment Comments  Pt demonstrates good posture today and as well as improved thoracic and cervical mobility. We progressed with strengthening today which she tolerated well. We did progress HEP as I would like to see how she does after todays progression of exercises and make sure she does not have a flare up. Decreased her frequency to twice a week todaay due to progress.   -TR        PT Plan    PT Plan Comments  Assess how she did over the weekend and cotninue to progress as tolerated.   -TR       User Key  (r) = Recorded By, (t) = Taken By, (c) = Cosigned By    Initials Name Provider Type    TR Kiana Kramer PTA Physical Therapy Assistant            Exercises     Row Name 19 2904             Subjective Comments    Subjective Comments  Pt reports walking around at tater day with no increase in pain.   -TR         Subjective Pain    Able to rate subjective pain?  yes  -TR      Pre-Treatment Pain Level  0  -TR      Post-Treatment Pain Level  0  -TR         Total Minutes    50182 - PT Therapeutic Exercise Minutes  31  -TR      85099 - PT Manual Therapy Minutes  23  -TR         Exercise 1    Exercise Name 1  Hooklying abduction  -TR      Cueing 1  Verbal  -TR      Sets 1  2  -TR      Reps 1  15  -TR         Exercise 2    Exercise Name 2  Hooklying D1 flexion against  red band   -TR      Cueing 2  Verbal  -TR      Sets 2  2  -TR      Reps 2  15  -TR      Additional Comments  BUE   -TR         Exercise 3    Exercise Name 3  --  -TR      Cueing 3  --  -TR      Sets 3  --  -TR      Time 3  --  -TR         Exercise 4    Exercise Name 4  Palof press at cybex   -TR      Cueing 4  Verbal  -TR      Sets 4  2  -TR      Reps 4  10  -TR      Time 4  10#  -TR      Additional Comments  Staggard stance   -TR         Exercise 5    Exercise Name 5  RIP  at cybex thoracic rotation   -TR      Cueing 5  Verbal  -TR      Sets 5  1  -TR      Reps 5  10  -TR      Additional Comments  Bilateral   -TR         Exercise 6    Exercise Name 6  Scapular squeezes against green band   -TR      Cueing 6  Verbal  -TR      Sets 6  2  -TR      Reps 6  15  -TR        User Key  (r) = Recorded By, (t) = Taken By, (c) = Cosigned By    Initials Name Provider Type    TR Kiana Kramer, MARTY Physical Therapy Assistant                      Manual Rx (last 36 hours)      Manual Treatments     Row Name 04/03/19 1055             Total Minutes    42071 - PT Manual Therapy Minutes  23  -TR         Manual Rx 1    Manual Rx 1 Location  Cervical paraspinals  -TR      Manual Rx 1 Type  STM in hooklyign with bolster under knees  -TR      Manual Rx 1 Grade  min  -TR      Manual Rx 1 Duration  2  -TR         Manual Rx 2    Manual Rx 2 Location  Cervical   -TR      Manual Rx 2 Type  Manual traction in neutral and in R SB  -TR      Manual Rx 2 Grade  min  -TR      Manual Rx 2 Duration  3  -TR         Manual Rx 3    Manual Rx 3 Location  Thoracic stretch over 1/2 foam roller   -TR      Manual Rx 3 Duration  3 min   -TR         Manual Rx 4    Manual Rx 4 Location  Thoracic paraspinals  -TR      Manual Rx 4 Type  IASTM with foam roller  -TR      Manual Rx 4 Grade  min  -TR      Manual Rx 4 Duration  10  -TR         Manual Rx 5    Manual Rx 5 Location  Upper thoracic and CT junction  -TR      Manual Rx 5 Type  Extension  mobilizations  -TR      Manual Rx 5 Grade  1-2  -TR      Manual Rx 5 Duration  5  -TR        User Key  (r) = Recorded By, (t) = Taken By, (c) = Cosigned By    Initials Name Provider Type    Kiana Murillo PTA Physical Therapy Assistant          PT OP Goals     Row Name 04/03/19 1055          Long Term Goals    LTG Date to Achieve  05/01/19  -TR     LTG 1  Neck Disability Index <10%  -TR     LTG 1 Progress  Ongoing  -TR     LTG 2  Symmetrical saúl cervical rotation without impingement  -TR     LTG 2 Progress  Met  -TR     LTG 2 Progress Comments  Symetrical today both WNL   -TR     LTG 3  Independent with HEP for posture and flexibility  -TR     LTG 3 Progress  Ongoing  -TR        Time Calculation    PT Goal Re-Cert Due Date  04/19/19  -TR       User Key  (r) = Recorded By, (t) = Taken By, (c) = Cosigned By    Initials Name Provider Type    Kiana Murillo PTA Physical Therapy Assistant          Therapy Education  Given: HEP, Symptoms/condition management  Program: Reinforced  How Provided: Verbal  Provided to: Patient  Level of Understanding: Verbalized              Time Calculation:   Start Time: 1055  Stop Time: 1149  Time Calculation (min): 54 min  Total Timed Code Minutes- PT: 54 minute(s)  Therapy Charges for Today     Code Description Service Date Service Provider Modifiers Qty    85409262813 HC PT THER PROC EA 15 MIN 4/3/2019 Kiana Kramer PTA GP 2    88979589575 HC PT MANUAL THERAPY EA 15 MIN 4/3/2019 Kiana Kramer PTA GP 2                    Kiana Kramer PTA  4/3/2019

## 2019-04-05 ENCOUNTER — APPOINTMENT (OUTPATIENT)
Dept: PHYSICAL THERAPY | Facility: HOSPITAL | Age: 59
End: 2019-04-05

## 2019-04-08 ENCOUNTER — HOSPITAL ENCOUNTER (OUTPATIENT)
Dept: PHYSICAL THERAPY | Facility: HOSPITAL | Age: 59
Setting detail: THERAPIES SERIES
Discharge: HOME | End: 2019-04-08

## 2019-04-08 DIAGNOSIS — M54.2 CERVICALGIA: Primary | ICD-10-CM

## 2019-04-08 PROCEDURE — 97140 MANUAL THERAPY 1/> REGIONS: CPT

## 2019-04-08 PROCEDURE — 97110 THERAPEUTIC EXERCISES: CPT

## 2019-04-10 ENCOUNTER — HOSPITAL ENCOUNTER (OUTPATIENT)
Dept: PHYSICAL THERAPY | Facility: HOSPITAL | Age: 59
Setting detail: THERAPIES SERIES
Discharge: HOME | End: 2019-04-10

## 2019-04-10 DIAGNOSIS — M54.2 CERVICALGIA: Primary | ICD-10-CM

## 2019-04-10 PROCEDURE — 97110 THERAPEUTIC EXERCISES: CPT

## 2019-04-10 PROCEDURE — 97140 MANUAL THERAPY 1/> REGIONS: CPT

## 2019-04-10 NOTE — THERAPY TREATMENT NOTE
"    Outpatient Physical Therapy Ortho Treatment Note  [facility]     Patient Name: Soledad Ram  : 1960  MRN: [id number]  Today's Date: 4/10/2019      Visit Date: 04/10/2019    Visit Dx:    ICD-10-CM ICD-9-CM   1. Cervicalgia M54.2 723.1       Patient Active Problem List   Diagnosis   • Obesity (BMI 30.0-34.9)        Past Medical History:   Diagnosis Date   • Diabetes mellitus (CMS/HCC)    • Infection         Past Surgical History:   Procedure Laterality Date   • CARPAL TUNNEL RELEASE     • TONSILLECTOMY     • TUBAL ABDOMINAL LIGATION                         PT Assessment/Plan     Row Name 04/10/19 1100          PT Assessment    Assessment Comments  Pt continues to report improvements in her pain and symptoms. Progressed with scapular and postural strengthening today which pt tolerated well without any increase in pain . She did require ccues for form as she fatigued. We will assess how she does with progression today and bolster HEP with prone exercises next session.   -TR        PT Plan    PT Plan Comments  Bolslter HEP with prone exercises.   -TR       User Key  (r) = Recorded By, (t) = Taken By, (c) = Cosigned By    Initials Name Provider Type    TR Kiana Kramer, PTA Physical Therapy Assistant            Exercises     Row Name 04/10/19 1100             Subjective Comments    Subjective Comments  Pt reports continued improvement   -TR         Subjective Pain    Able to rate subjective pain?  yes  -TR      Pre-Treatment Pain Level  0  -TR      Post-Treatment Pain Level  0  -TR         Total Minutes    94889 - PT Therapeutic Exercise Minutes  33  -TR      20522 - PT Manual Therapy Minutes  12  -TR         Exercise 1    Exercise Name 1  Prone \"I's\"  -TR      Cueing 1  Verbal  -TR      Sets 1  2  -TR      Reps 1  10  -TR         Exercise 2    Exercise Name 2  Prone \"T's\"   -TR      Cueing 2  Verbal  -TR      Sets 2  2  -TR      Reps 2  10  -TR      Additional Comments  Cues for form and pace   -TR  " "       Exercise 3    Exercise Name 3  Prone \"Y's\"   -TR      Cueing 3  Verbal  -TR      Sets 3  2  -TR      Reps 3  10  -TR         Exercise 4    Exercise Name 4  Thoracic 1/2 foam roller   -TR      Cueing 4  Verbal  -TR      Time 4  3 min   -TR         Exercise 5    Exercise Name 5  SA punches   -TR      Cueing 5  Verbal;Tactile  -TR      Sets 5  2  -TR      Reps 5  10  -TR         Exercise 6    Exercise Name 6  Hooklying \"Y's\" with band under knees.  -TR      Cueing 6  Verbal  -TR      Sets 6  2  -TR      Reps 6  10  -TR         Exercise 7    Exercise Name 7  Scapular retraction with TRX straps   -TR      Cueing 7  Verbal  -TR      Sets 7  2  -TR      Reps 7  10  -TR         Exercise 8    Exercise Name 8  PNF at [employer] D1 flexion against 10#  -TR      Cueing 8  Verbal  -TR      Sets 8  2  -TR      Reps 8  10  -TR      Additional Comments  BUE   -TR        User Key  (r) = Recorded By, (t) = Taken By, (c) = Cosigned By    Initials Name Provider Type    TR Kiana Kramer PTA Physical Therapy Assistant                      Manual Rx (last 36 hours)      Manual Treatments     Row Name 04/10/19 1100             Total Minutes    83920 - PT Manual Therapy Minutes  12  -TR         Manual Rx 4    Manual Rx 4 Location  Upper Thoracic paraspinals  -TR      Manual Rx 4 Type  IASTM with foam roller  -TR      Manual Rx 4 Grade  min  -TR      Manual Rx 4 Duration  12  -TR        User Key  (r) = Recorded By, (t) = Taken By, (c) = Cosigned By    Initials Name Provider Type    TR Kiana Kramer PTA Physical Therapy Assistant          PT OP Goals     Row Name 04/10/19 1100          Long Term Goals    LTG Date to Achieve  05/01/19  -TR     LTG 1  Neck Disability Index <10%  -TR     LTG 1 Progress  Ongoing  -TR     LTG 2  Symmetrical saúl cervical rotation without impingement  -TR     LTG 2 Progress  Met  -TR     LTG 3  Independent with HEP for posture and flexibility  -TR     LTG 3 Progress  Ongoing  -TR     LTG 3 Progress " Comments  She can demonstrate all components   -TR        Time Calculation    PT Goal Re-Cert Due Date  04/19/19  -TR       User Key  (r) = Recorded By, (t) = Taken By, (c) = Cosigned By    Initials Name Provider Type    Kiana Murillo PTA Physical Therapy Assistant          Therapy Education  Given: HEP  Program: Reinforced  How Provided: Verbal  Provided to: Patient  Level of Understanding: Verbalized              Time Calculation:   Start Time: 1100  Stop Time: 1145  Time Calculation (min): 45 min  Total Timed Code Minutes- PT: 45 minute(s)  Therapy Charges for Today     Code Description Service Date Service Provider Modifiers Qty    37459419984 HC PT THER PROC EA 15 MIN 4/10/2019 Kiana Kramer PTA GP 2    47779460755 HC PT MANUAL THERAPY EA 15 MIN 4/10/2019 Kiana Kramer PTA GP 1                    Kiana Kramer PTA  4/10/2019

## 2019-04-12 ENCOUNTER — APPOINTMENT (OUTPATIENT)
Dept: PHYSICAL THERAPY | Facility: HOSPITAL | Age: 59
End: 2019-04-12

## 2019-04-15 ENCOUNTER — HOSPITAL ENCOUNTER (OUTPATIENT)
Dept: PHYSICAL THERAPY | Facility: HOSPITAL | Age: 59
Setting detail: THERAPIES SERIES
Discharge: HOME | End: 2019-04-15

## 2019-04-15 DIAGNOSIS — M54.2 CERVICALGIA: Primary | ICD-10-CM

## 2019-04-15 PROCEDURE — 97110 THERAPEUTIC EXERCISES: CPT

## 2019-04-15 PROCEDURE — 97140 MANUAL THERAPY 1/> REGIONS: CPT

## 2019-04-15 NOTE — THERAPY PROGRESS REPORT/RE-CERT
Outpatient Physical Therapy Ortho Progress Note  [facility]     Patient Name: Soledad Ram  : 1960  MRN: [id number]  Today's Date: 4/15/2019      Visit Date: 04/15/2019    Visit Dx:    ICD-10-CM ICD-9-CM   1. Cervicalgia M54.2 723.1       Patient Active Problem List   Diagnosis   • Obesity (BMI 30.0-34.9)        Past Medical History:   Diagnosis Date   • Diabetes mellitus (CMS/HCC)    • Infection         Past Surgical History:   Procedure Laterality Date   • CARPAL TUNNEL RELEASE     • TONSILLECTOMY     • TUBAL ABDOMINAL LIGATION                         PT Assessment/Plan     Row Name 04/15/19 0917          PT Assessment    Functional Limitations  Limitation in home management;Limitations in functional capacity and performance;Performance in leisure activities  -TB     Impairments  Pain;Range of motion;Joint mobility  -TB     Assessment Comments  Today goals were assessed for a progress note. She has currently met 2/3 goals. Her remaining goal is independence w/ HEP which she reports consistency w/ but it was bolstered today. She feels as if she has improved 80% since beginning therapy. Today we focused on scapular strengthening. Pt. required minor tactile cueing for correct positioning and allignment. No pain was reported today. STM was applied to B UT and LS to get them to relax more.   -AF     Rehab Potential  Excellent  -TB     Patient/caregiver participated in establishment of treatment plan and goals  Yes  -TB     Patient would benefit from skilled therapy intervention  Yes  -TB        PT Plan    PT Frequency  2x/week;3x/week  -TB     Predicted Duration of Therapy Intervention (Therapy Eval)  2-4 weeks  -TB     Planned CPT's?  PT THER PROC EA 15 MIN: 04447;PT MANUAL THERAPY EA 15 MIN: 33674;PT ELECTRICAL STIM UNATTEND: ;PT ELECTRICAL STIM ATTD EA 15 MIN: 51202;PT ULTRASOUND EA 15 MIN: 02299  -TB     PT Plan Comments  Continue to strengthen scapular muscles and focus on posture.   -AF        User Key  (r) = Recorded By, (t) = Taken By, (c) = Cosigned By    Initials Name Provider Type    TB Vinnie Moreno, PT Physical Therapist    AF Xi Bejarano PTA Physical Therapy Assistant            Exercises     Row Name 04/15/19 0917             Subjective Comments    Subjective Comments  Pt. reports feeling okay today. Her  races so sometimes it bothers her neck to watch him go around in circles for a prolonged period of time.   -AF         Subjective Pain    Able to rate subjective pain?  yes  -AF      Pre-Treatment Pain Level  0  -AF      Post-Treatment Pain Level  0  -AF         Total Minutes    82348 - PT Therapeutic Exercise Minutes  35  -AF      83444 - PT Manual Therapy Minutes  12  -AF         Exercise 1    Exercise Name 1  assessed goals foro progress note  -AF         Exercise 2    Exercise Name 2  prone Ts  -AF      Cueing 2  Verbal;Tactile  -AF      Sets 2  2  -AF      Reps 2  10  -AF         Exercise 3    Exercise Name 3  prone Ys  -AF      Cueing 3  Verbal;Tactile  -AF      Sets 3  2  -AF      Reps 3  10  -AF         Exercise 4    Exercise Name 4  prone Ws  -AF      Cueing 4  Verbal;Tactile  -AF      Sets 4  2  -AF      Reps 4  10  -AF         Exercise 5    Exercise Name 5  thoracic 1/2 bolster stretch  -AF      Cueing 5  Verbal  -AF      Time 5  3 min  -AF         Exercise 6    Exercise Name 6  SL open books  -AF      Cueing 6  Verbal;Demo  -AF      Sets 6  2  -AF      Reps 6  10  -AF      Additional Comments  bilateral  -AF         Exercise 7    Exercise Name 7  SA punches w/ 2# weight  -AF      Cueing 7  Verbal;Tactile;Demo  -AF      Sets 7  2  -AF      Reps 7  10  -AF         Exercise 8    Exercise Name 8  horizontal abd against half-bolster  -AF      Cueing 8  Verbal;Demo  -AF      Sets 8  2  -AF      Reps 8  10  -AF      Additional Comments  Red TB  -AF         Exercise 9    Exercise Name 9  resisted chin tucks  -AF      Cueing 9  Verbal;Demo;Tactile  -AF      Sets 9  2  -AF       Reps 9  10  -AF      Additional Comments  yellow ball  -AF        User Key  (r) = Recorded By, (t) = Taken By, (c) = Cosigned By    Initials Name Provider Type    AF [place], Xi, PTA Physical Therapy Assistant                      Manual Rx (last 36 hours)      Manual Treatments     Row Name 04/15/19 0917             Total Minutes    45437 - PT Manual Therapy Minutes  12  -AF         Manual Rx 1    Manual Rx 1 Location  B UT and LS  -AF      Manual Rx 1 Type  STM seated in chair.   -AF      Manual Rx 1 Duration  12 min  -AF        User Key  (r) = Recorded By, (t) = Taken By, (c) = Cosigned By    Initials Name Provider Type    AF [place], Xi, PTA Physical Therapy Assistant          PT OP Goals     Row Name 04/15/19 0917          Long Term Goals    LTG Date to Achieve  05/15/19  -AF     LTG 1  Neck Disability Index <10%  -AF     LTG 1 Progress  Met  -AF     LTG 1 Progress Comments  Pt. scored 5% on NDI.  -AF     LTG 2  Symmetrical saúl cervical rotation without impingement  -AF     LTG 2 Progress  Met  -AF     LTG 3  Independent with HEP for posture and flexibility  -AF     LTG 3 Progress  Ongoing;Progressing  -AF     LTG 3 Progress Comments  Pt. is consistent w/ HEP as of now. It was bolstered today.  -AF        Time Calculation    PT Goal Re-Cert Due Date  05/15/19  -AF       User Key  (r) = Recorded By, (t) = Taken By, (c) = Cosigned By    Initials Name Provider Type    AF [place], Xi, PTA Physical Therapy Assistant          Therapy Education  Education Details: prone T's, Y's, & W's added to HEP  Given: HEP  Program: New, Reinforced  How Provided: Verbal, Demonstration, Written  Provided to: Patient  Level of Understanding: Verbalized, Demonstrated    Outcome Measure Options: Neck Disability Index (NDI)  Neck Disability Index  Section 1 - Pain Intensity: I have no pain at the moment.  Section 2 - Personal Care: I can look after myself normally without causing extra pain.  Section 3 - Lifting: I  can lift heavy weights, but it gives me extra pain.  Section 4 - Work: I can only do my usual work, but no more  Section 5 - Headaches: I have slight headaches that come infrequently.  Section 6 - Concentration: I can concentrate fully without difficulty.  Section 7 - Sleeping: I have no trouble sleeping.  Section 8 - Driving: I can drive as long as I want with slight neck pain.  Section 9 - Reading: I can read as much as I want with no neck pain.  Section 10 - Recreation: I have some neck pain with all recreational activities.  Neck Disability Index Score: 5      Time Calculation:   Start Time: 0917  Stop Time: 1004  Time Calculation (min): 47 min  Total Timed Code Minutes- PT: 47 minute(s)      PT G-Codes  Outcome Measure Options: Neck Disability Index (NDI)  Neck Disability Index Score: 5       The plan of care and all goals were reviewed and updated as needed by Vinnie Moreno, PT 4/15/2019 6:02 PM    Vinnie Moreno, PT  4/15/2019

## 2019-04-17 ENCOUNTER — APPOINTMENT (OUTPATIENT)
Dept: PHYSICAL THERAPY | Facility: HOSPITAL | Age: 59
End: 2019-04-17

## 2019-04-18 ENCOUNTER — HOSPITAL ENCOUNTER (OUTPATIENT)
Dept: PHYSICAL THERAPY | Facility: HOSPITAL | Age: 59
Setting detail: THERAPIES SERIES
Discharge: HOME | End: 2019-04-18

## 2019-04-18 DIAGNOSIS — M54.2 CERVICALGIA: Primary | ICD-10-CM

## 2019-04-18 PROCEDURE — 97110 THERAPEUTIC EXERCISES: CPT

## 2019-04-18 PROCEDURE — 97140 MANUAL THERAPY 1/> REGIONS: CPT

## 2019-04-18 NOTE — THERAPY TREATMENT NOTE
"    Outpatient Physical Therapy Ortho Treatment Note  [facility]     Patient Name: Soledad Ram  : 1960  MRN: [id number]  Today's Date: 2019      Visit Date: 2019    Visit Dx:    ICD-10-CM ICD-9-CM   1. Cervicalgia M54.2 723.1       Patient Active Problem List   Diagnosis   • Obesity (BMI 30.0-34.9)        Past Medical History:   Diagnosis Date   • Diabetes mellitus (CMS/HCC)    • Infection         Past Surgical History:   Procedure Laterality Date   • CARPAL TUNNEL RELEASE     • TONSILLECTOMY     • TUBAL ABDOMINAL LIGATION                         PT Assessment/Plan     Row Name 19 1112          PT Assessment    Assessment Comments  Pt continues to do well with therrapy and with progression in exercises. She has 2 scheduled visist left in which we will continue with strenghtenign and finalize her HEP.   -TR        PT Plan    PT Plan Comments  COntinue to progress and finalize HEP   -TR       User Key  (r) = Recorded By, (t) = Taken By, (c) = Cosigned By    Initials Name Provider Type    TR Kiana Kramer, MARTY Physical Therapy Assistant            Exercises     Row Name 19 1112             Subjective Comments    Subjective Comments  Pt reports that her neck is feeling much better; however she has some pain shots yesterday in her back and thats hurting her some. She reports compliance with new HEP components.   -TR         Subjective Pain    Able to rate subjective pain?  yes  -TR      Pre-Treatment Pain Level  0  -TR      Post-Treatment Pain Level  0  -TR      Subjective Pain Comment  No back pain after session   -TR         Total Minutes    30686 - PT Therapeutic Exercise Minutes  35  -TR      92191 - PT Manual Therapy Minutes  12  -TR         Exercise 1    Exercise Name 1  Prone rows with 1# weight   -TR      Cueing 1  Verbal  -TR      Sets 1  2  -TR      Reps 1  10  -TR      Additional Comments  BUE   -TR         Exercise 2    Exercise Name 2  Hookying \"Y's\" against red band " "under knees   -TR      Cueing 2  Verbal;Tactile  -TR      Sets 2  3  -TR      Reps 2  10  -TR         Exercise 3    Exercise Name 3  Alternating BUE horizontal abduction   -TR      Cueing 3  Verbal  -TR      Sets 3  2  -TR      Reps 3  10  -TR      Additional Comments  green can-do  -TR         Exercise 4    Exercise Name 4  D1 flexion against green band in hooklying   -TR      Cueing 4  Verbal  -TR      Sets 4  2  -TR      Reps 4  10  -TR         Exercise 5    Exercise Name 5  Scapular retractionn at TRX straps  -TR      Cueing 5  Verbal  -TR      Sets 5  2  -TR      Reps 5  10  -TR         Exercise 6    Exercise Name 6  Leaning \"T's\" at TRX straps  -TR      Cueing 6  Verbal  -TR      Sets 6  2  -TR      Reps 6  10  -TR        User Key  (r) = Recorded By, (t) = Taken By, (c) = Cosigned By    Initials Name Provider Type    TR Kiana Kramer PTA Physical Therapy Assistant                      Manual Rx (last 36 hours)      Manual Treatments     Row Name 04/18/19 1112             Total Minutes    91774 - PT Manual Therapy Minutes  12  -TR         Manual Rx 1    Manual Rx 1 Location  thoracic and lumbar parapsinals   -TR      Manual Rx 1 Type  IASTM with pink foam roller   -TR      Manual Rx 1 Grade  mod  -TR      Manual Rx 1 Duration  12  -TR        User Key  (r) = Recorded By, (t) = Taken By, (c) = Cosigned By    Initials Name Provider Type    TR Kiana Kramer PTA Physical Therapy Assistant          PT OP Goals     Row Name 04/18/19 1112          Long Term Goals    LTG Date to Achieve  05/15/19  -TR     LTG 1  Neck Disability Index <10%  -TR     LTG 1 Progress  Met  -TR     LTG 2  Symmetrical saúl cervical rotation without impingement  -TR     LTG 2 Progress  Met  -TR     LTG 3  Independent with HEP for posture and flexibility  -TR     LTG 3 Progress  Ongoing;Progressing  -TR     LTG 3 Progress Comments  reviewed components today   -TR        Time Calculation    PT Goal Re-Cert Due Date  05/15/19  -TR  "      User Key  (r) = Recorded By, (t) = Taken By, (c) = Cosigned By    Initials Name Provider Type    TR Kiana Kramer PTA Physical Therapy Assistant          Therapy Education  Given: HEP  Program: Reinforced  How Provided: Verbal  Provided to: Patient  Level of Understanding: Verbalized              Time Calculation:   Start Time: 1112  Stop Time: 1159  Time Calculation (min): 47 min  Total Timed Code Minutes- PT: 47 minute(s)  Therapy Charges for Today     Code Description Service Date Service Provider Modifiers Qty    45494695470 HC PT THER PROC EA 15 MIN 4/18/2019 Kiana Kramer PTA GP 2    48544368641 HC PT MANUAL THERAPY EA 15 MIN 4/18/2019 Kiana Kramer PTA GP 1                    Kiana Kramer PTA  4/18/2019

## 2019-04-22 ENCOUNTER — HOSPITAL ENCOUNTER (OUTPATIENT)
Dept: PHYSICAL THERAPY | Facility: HOSPITAL | Age: 59
Setting detail: THERAPIES SERIES
Discharge: HOME | End: 2019-04-22

## 2019-04-22 DIAGNOSIS — M54.2 CERVICALGIA: Primary | ICD-10-CM

## 2019-04-22 PROCEDURE — 97110 THERAPEUTIC EXERCISES: CPT

## 2019-04-22 NOTE — THERAPY TREATMENT NOTE
Outpatient Physical Therapy Ortho Treatment Note   [place]     Patient Name: Soledad Ram  : 1960  MRN: [id number]  Today's Date: 2019      Visit Date: 2019    Visit Dx:    ICD-10-CM ICD-9-CM   1. Cervicalgia M54.2 723.1       Patient Active Problem List   Diagnosis   • Obesity (BMI 30.0-34.9)        Past Medical History:   Diagnosis Date   • Diabetes mellitus (CMS/HCC)    • Infection         Past Surgical History:   Procedure Laterality Date   • CARPAL TUNNEL RELEASE     • TONSILLECTOMY     • TUBAL ABDOMINAL LIGATION                         PT Assessment/Plan     Row Name 19 1120          PT Assessment    Assessment Comments  Finalized pt's HEP and progressed with strengthening she continues to do well with no increase in pain and has returned to all of her previous activties without pain.   -TR        PT Plan    PT Plan Comments  D/C next visit.   -TR       User Key  (r) = Recorded By, (t) = Taken By, (c) = Cosigned By    Initials Name Provider Type    TR Kiana Kramer PTA Physical Therapy Assistant            Exercises     Row Name 19 1120             Subjective Pain    Able to rate subjective pain?  yes  -TR      Pre-Treatment Pain Level  0  -TR      Post-Treatment Pain Level  0  -TR         Total Minutes    87644 - PT Therapeutic Exercise Minutes  40  -TR         Exercise 1    Exercise Name 1  SCIFIT BUE bike   -TR      Cueing 1  Verbal  -TR      Time 1  8 min   -TR      Additional Comments  4 min forward 4 min backwards   -TR         Exercise 2    Exercise Name 2  D1 & D2 flexion against 20# at cybexx   -TR      Cueing 2  Verbal  -TR      Sets 2  2  -TR      Reps 2  10  -TR         Exercise 3    Exercise Name 3  Scapular retraction with green band and elbows straight  -TR      Cueing 3  Verbal  -TR      Sets 3  3  -TR      Reps 3  10  -TR      Additional Comments  Added to HEP   -TR         Exercise 4    Exercise Name 4  Pizza carry with green band   -TR       "Cueing 4  Verbal  -TR      Sets 4  2  -TR      Reps 4  10  -TR         Exercise 5    Exercise Name 5  Standing serratus punch against red band   -TR      Cueing 5  Verbal  -TR      Sets 5  2  -TR      Reps 5  10  -TR      Additional Comments  BUE   -TR         Exercise 6    Exercise Name 6  seated on 75 cm SB \"Y's\" against red band under knees   -TR      Cueing 6  Verbal  -TR      Sets 6  2  -TR      Reps 6  10  -TR         Exercise 7    Exercise Name 7  --  -TR      Cueing 7  --  -TR      Sets 7  --  -TR      Time 7  --  -TR        User Key  (r) = Recorded By, (t) = Taken By, (c) = Cosigned By    Initials Name Provider Type    Kiana Murillo PTA Physical Therapy Assistant                       PT OP Goals     Row Name 04/22/19 1120          Long Term Goals    LTG Date to Achieve  05/15/19  -TR     LTG 1  Neck Disability Index <10%  -TR     LTG 1 Progress  Met  -TR     LTG 2  Symmetrical súal cervical rotation without impingement  -TR     LTG 2 Progress  Met  -TR     LTG 3  Independent with HEP for posture and flexibility  -TR     LTG 3 Progress  Ongoing;Progressing  -TR     LTG 3 Progress Comments  finalized today   -TR        Time Calculation    PT Goal Re-Cert Due Date  05/15/19  -TR       User Key  (r) = Recorded By, (t) = Taken By, (c) = Cosigned By    Initials Name Provider Type    Kiana Murillo PTA Physical Therapy Assistant          Therapy Education  Education Details: Pizza carry, scapular retraction with green band   Given: HEP  Program: Reinforced  How Provided: Verbal, Demonstration, Written  Provided to: Patient  Level of Understanding: Verbalized, Demonstrated              Time Calculation:   Start Time: 1120  Stop Time: 1200  Time Calculation (min): 40 min  Total Timed Code Minutes- PT: 40 minute(s)  Therapy Charges for Today     Code Description Service Date Service Provider Modifiers Qty    45925252180 HC PT THER PROC EA 15 MIN 4/22/2019 Kiana Kramer PTA GP 3            "         Kiana Kramer, PTA  4/22/2019

## 2019-04-24 ENCOUNTER — HOSPITAL ENCOUNTER (OUTPATIENT)
Dept: PHYSICAL THERAPY | Facility: HOSPITAL | Age: 59
Setting detail: THERAPIES SERIES
Discharge: HOME | End: 2019-04-24

## 2019-04-24 DIAGNOSIS — M54.2 CERVICALGIA: Primary | ICD-10-CM

## 2019-04-24 PROCEDURE — 97110 THERAPEUTIC EXERCISES: CPT

## 2019-04-24 NOTE — THERAPY DISCHARGE NOTE
Outpatient Physical Therapy Ortho Treatment Note/Discharge Summary  [facility]     Patient Name: Soledad Ram  : 1960  MRN: [id number]  Today's Date: 2019      Visit Date: 2019    Visit Dx:    ICD-10-CM ICD-9-CM   1. Cervicalgia M54.2 723.1       Patient Active Problem List   Diagnosis   • Obesity (BMI 30.0-34.9)        Past Medical History:   Diagnosis Date   • Diabetes mellitus (CMS/HCC)    • Infection         Past Surgical History:   Procedure Laterality Date   • CARPAL TUNNEL RELEASE     • TONSILLECTOMY     • TUBAL ABDOMINAL LIGATION                         PT Assessment/Plan     Row Name 19 1102          PT Assessment    Assessment Comments  Pt has met all goals at this time and reports feeling 100% improved. Pt has been feeling great the past few sessions and reports that she has not had any pain in the past few weeks. She reports that she is back to all her normal activties without any problem. Pt reports complaince with HEP and was able to demonstrate all components without any increase in pain and with good form. Soledad has improved grealty and is now demostrating normal ROM and improved strength in scapular and postural musculature. Pt has needed materials to continue to work on strengthening and avoid future flare ups.   -TR        PT Plan    PT Plan Comments  D/C   -TR       User Key  (r) = Recorded By, (t) = Taken By, (c) = Cosigned By    Initials Name Provider Type    TR Kiana Kramer PTA Physical Therapy Assistant              Exercises     Row Name 19 1102             Subjective Comments    Subjective Comments  Pt reports being readyd for D/C and confident with all HEP components.   -TR         Subjective Pain    Able to rate subjective pain?  yes  -TR      Pre-Treatment Pain Level  0  -TR      Post-Treatment Pain Level  0  -TR         Total Minutes    02875 - PT Therapeutic Exercise Minutes  41  -TR         Exercise 1    Exercise Name 1  NanoVelos  Seaat #9, handles #7, levbel 5   -TR      Cueing 1  Verbal  -TR      Time 1  10   -TR      Additional Comments  5 each direction, 5 direction   -TR         Exercise 2    Exercise Name 2  Standing shoulder extension against 10# BUE   -TR      Cueing 2  Verbal  -TR      Sets 2  2  -TR      Reps 2  15  -TR      Additional Comments  BUE   -TR         Exercise 3    Exercise Name 3  Standing D1 extension against 10# at CYBEX   -TR      Cueing 3  Verbal  -TR      Sets 3  2  -TR      Reps 3  10  -TR      Time 3  Standing D1 flexion against 20# at cybex   -TR      Additional Comments  BUE   -TR         Exercise 4    Exercise Name 4  Serratus punches against red band   -TR      Cueing 4  Verbal  -TR      Sets 4  2  -TR      Reps 4  10  -TR      Additional Comments  BUE   -TR         Exercise 5    Exercise Name 5  Seated on 75 cm SB antirotaiton s with green abnd   -TR      Cueing 5  Verbal  -TR      Sets 5  2  -TR      Time 5  1 mineach   -TR        User Key  (r) = Recorded By, (t) = Taken By, (c) = Cosigned By    Initials Name Provider Type    Kiana Murillo PTA Physical Therapy Assistant                         PT OP Goals     Row Name 04/24/19 1102          Long Term Goals    LTG Date to Achieve  05/15/19  -TR     LTG 1  Neck Disability Index <10%  -TR     LTG 1 Progress  Met  -TR     LTG 2  Symmetrical saúl cervical rotation without impingement  -TR     LTG 2 Progress  Met  -TR     LTG 3  Independent with HEP for posture and flexibility  -TR     LTG 3 Progress  Met  -TR       User Key  (r) = Recorded By, (t) = Taken By, (c) = Cosigned By    Initials Name Provider Type    Kiana Murillo PTA Physical Therapy Assistant          Therapy Education  Given: HEP              Time Calculation:   Start Time: 1102  Stop Time: 1143  Time Calculation (min): 41 min  Total Timed Code Minutes- PT: 41 minute(s)  Therapy Charges for Today     Code Description Service Date Service Provider Modifiers Qty    55356066174   PT THER PROC EA 15 MIN 4/24/2019 Kiana Kramer, PTA GP 3                OP PT Discharge Summary  Date of Discharge: 04/24/19  Reason for Discharge: All goals achieved  Outcomes Achieved: Able to achieve all goals within established timeline  Discharge Destination: Home with home program  Discharge Instructions/Additional Comments: See jeffery Kramer PTA  4/24/2019

## 2020-02-05 ENCOUNTER — APPOINTMENT (OUTPATIENT)
Dept: CT IMAGING | Facility: HOSPITAL | Age: 60
End: 2020-02-05

## 2020-02-05 ENCOUNTER — HOSPITAL ENCOUNTER (EMERGENCY)
Facility: HOSPITAL | Age: 60
Discharge: HOME | End: 2020-02-05
Admitting: INTERNAL MEDICINE

## 2020-02-05 VITALS
OXYGEN SATURATION: 98 % | WEIGHT: 202 LBS | TEMPERATURE: 98.1 F | RESPIRATION RATE: 16 BRPM | HEART RATE: 70 BPM | SYSTOLIC BLOOD PRESSURE: 118 MMHG | HEIGHT: 66 IN | DIASTOLIC BLOOD PRESSURE: 77 MMHG | BODY MASS INDEX: 32.47 KG/M2

## 2020-02-05 DIAGNOSIS — M51.36 BULGING LUMBAR DISC: Primary | ICD-10-CM

## 2020-02-05 LAB
ANION GAP SERPL CALCULATED.3IONS-SCNC: 12 MMOL/L (ref 5–15)
BASOPHILS # BLD AUTO: 0.08 10*3/MM3 (ref 0–0.2)
BASOPHILS NFR BLD AUTO: 0.8 % (ref 0–1.5)
BILIRUB UR QL STRIP: NEGATIVE
BUN BLD-MCNC: 14 MG/DL (ref 6–20)
BUN/CREAT SERPL: 21.9 (ref 7–25)
CALCIUM SPEC-SCNC: 9.1 MG/DL (ref 8.6–10.5)
CHLORIDE SERPL-SCNC: 104 MMOL/L (ref 98–107)
CLARITY UR: CLEAR
CO2 SERPL-SCNC: 22 MMOL/L (ref 22–29)
COLOR UR: YELLOW
CREAT BLD-MCNC: 0.64 MG/DL (ref 0.57–1)
CRP SERPL-MCNC: 0.49 MG/DL (ref 0–0.5)
DEPRECATED RDW RBC AUTO: 42.2 FL (ref 37–54)
EOSINOPHIL # BLD AUTO: 0.83 10*3/MM3 (ref 0–0.4)
EOSINOPHIL NFR BLD AUTO: 8.3 % (ref 0.3–6.2)
ERYTHROCYTE [DISTWIDTH] IN BLOOD BY AUTOMATED COUNT: 13.3 % (ref 12.3–15.4)
ERYTHROCYTE [SEDIMENTATION RATE] IN BLOOD: 19 MM/HR (ref 0–20)
GFR SERPL CREATININE-BSD FRML MDRD: 95 ML/MIN/1.73
GLUCOSE BLD-MCNC: 204 MG/DL (ref 65–99)
GLUCOSE UR STRIP-MCNC: ABNORMAL MG/DL
HCT VFR BLD AUTO: 41.6 % (ref 34–46.6)
HGB BLD-MCNC: 14.3 G/DL (ref 12–15.9)
HGB UR QL STRIP.AUTO: NEGATIVE
IMM GRANULOCYTES # BLD AUTO: 0.04 10*3/MM3 (ref 0–0.05)
IMM GRANULOCYTES NFR BLD AUTO: 0.4 % (ref 0–0.5)
KETONES UR QL STRIP: NEGATIVE
LEUKOCYTE ESTERASE UR QL STRIP.AUTO: NEGATIVE
LYMPHOCYTES # BLD AUTO: 3.66 10*3/MM3 (ref 0.7–3.1)
LYMPHOCYTES NFR BLD AUTO: 36.7 % (ref 19.6–45.3)
MCH RBC QN AUTO: 29.9 PG (ref 26.6–33)
MCHC RBC AUTO-ENTMCNC: 34.4 G/DL (ref 31.5–35.7)
MCV RBC AUTO: 86.8 FL (ref 79–97)
MONOCYTES # BLD AUTO: 1.09 10*3/MM3 (ref 0.1–0.9)
MONOCYTES NFR BLD AUTO: 10.9 % (ref 5–12)
NEUTROPHILS # BLD AUTO: 4.28 10*3/MM3 (ref 1.7–7)
NEUTROPHILS NFR BLD AUTO: 42.9 % (ref 42.7–76)
NITRITE UR QL STRIP: NEGATIVE
NRBC BLD AUTO-RTO: 0 /100 WBC (ref 0–0.2)
PH UR STRIP.AUTO: 6.5 [PH] (ref 5–8)
PLATELET # BLD AUTO: 244 10*3/MM3 (ref 140–450)
PMV BLD AUTO: 10.6 FL (ref 6–12)
POTASSIUM BLD-SCNC: 4.1 MMOL/L (ref 3.5–5.2)
PROT UR QL STRIP: NEGATIVE
RBC # BLD AUTO: 4.79 10*6/MM3 (ref 3.77–5.28)
SODIUM BLD-SCNC: 138 MMOL/L (ref 136–145)
SP GR UR STRIP: >1.03 (ref 1–1.03)
UROBILINOGEN UR QL STRIP: ABNORMAL
WBC NRBC COR # BLD: 9.98 10*3/MM3 (ref 3.4–10.8)

## 2020-02-05 PROCEDURE — 96374 THER/PROPH/DIAG INJ IV PUSH: CPT

## 2020-02-05 PROCEDURE — 86140 C-REACTIVE PROTEIN: CPT | Performed by: NURSE PRACTITIONER

## 2020-02-05 PROCEDURE — 85025 COMPLETE CBC W/AUTO DIFF WBC: CPT | Performed by: NURSE PRACTITIONER

## 2020-02-05 PROCEDURE — 99283 EMERGENCY DEPT VISIT LOW MDM: CPT

## 2020-02-05 PROCEDURE — 72131 CT LUMBAR SPINE W/O DYE: CPT

## 2020-02-05 PROCEDURE — 81003 URINALYSIS AUTO W/O SCOPE: CPT | Performed by: NURSE PRACTITIONER

## 2020-02-05 PROCEDURE — 80048 BASIC METABOLIC PNL TOTAL CA: CPT | Performed by: NURSE PRACTITIONER

## 2020-02-05 PROCEDURE — 85651 RBC SED RATE NONAUTOMATED: CPT | Performed by: NURSE PRACTITIONER

## 2020-02-05 RX ORDER — CYCLOBENZAPRINE HCL 10 MG
10 TABLET ORAL 3 TIMES DAILY PRN
Qty: 9 TABLET | Refills: 0 | Status: SHIPPED | OUTPATIENT
Start: 2020-02-05 | End: 2020-02-08

## 2020-02-05 RX ORDER — FAMOTIDINE 20 MG/1
20 TABLET, FILM COATED ORAL 2 TIMES DAILY
COMMUNITY
End: 2020-11-09

## 2020-02-05 RX ORDER — METHYLPREDNISOLONE 4 MG/1
TABLET ORAL
Qty: 1 EACH | Refills: 0 | Status: SHIPPED | OUTPATIENT
Start: 2020-02-05 | End: 2020-11-09

## 2020-02-05 RX ORDER — ONDANSETRON 4 MG/1
4 TABLET, ORALLY DISINTEGRATING ORAL ONCE
Status: COMPLETED | OUTPATIENT
Start: 2020-02-05 | End: 2020-02-05

## 2020-02-05 RX ORDER — ROSUVASTATIN CALCIUM 20 MG/1
20 TABLET, COATED ORAL DAILY
COMMUNITY
End: 2020-11-09 | Stop reason: SDUPTHER

## 2020-02-05 RX ADMIN — ONDANSETRON 4 MG: 4 TABLET, ORALLY DISINTEGRATING ORAL at 17:59

## 2020-02-05 RX ADMIN — HYDROMORPHONE HYDROCHLORIDE 1 MG: 1 INJECTION, SOLUTION INTRAMUSCULAR; INTRAVENOUS; SUBCUTANEOUS at 18:00

## 2020-02-05 NOTE — ED PROVIDER NOTES
Subjective   Patient is a 60-year-old female that presents to the ER today with complaint of low back pain.  The patient reports that she has had a previous back surgery x2 in 2016 by Dr. Quiñones.  She states that she normally goes to pain management and does receive spinal injections.  She states that her last spinal injection was 1 month ago.  The patient reports that 1 month ago she also fell in her driveway.  She states that she landed on her back at that time.  Approximately 3 weeks ago she began to develop low back pain.  She states that it is mainly to the left of her incision site.  The patient states the pain is gradually gotten worse since that time.  The patient reports that she does have some numbness to the left buttock however that she normally has numbness and pain to the left leg.  She states that it does seem to be worse.  She denies any loss of bowel or bladder control, she denies any saddle anesthesias.  The patient presents to the ER today for further evaluation.  The patient reports that she has not had any pain medicine to treat the pain today.      History provided by:  Patient   used: No    Back Pain   Location:  Lumbar spine  Quality:  Shooting, stabbing and stiffness  Stiffness is present:  All day  Radiates to:  L posterior upper leg  Pain severity:  Moderate  Pain is:  Same all the time  Onset quality:  Gradual  Duration:  3 weeks  Timing:  Constant  Progression:  Worsening  Chronicity:  New  Context: not emotional stress, not falling, not jumping from heights, not lifting heavy objects, not MCA, not MVA, not occupational injury, not pedestrian accident, not physical stress, not recent illness, not recent injury and not twisting    Relieved by:  Nothing  Worsened by:  Nothing  Ineffective treatments:  None tried  Associated symptoms: paresthesias and tingling    Associated symptoms: no abdominal pain, no abdominal swelling, no bladder incontinence, no bowel incontinence,  no chest pain, no dysuria, no fever, no headaches, no leg pain, no numbness, no pelvic pain, no perianal numbness, no weakness and no weight loss    Risk factors: no hx of cancer, no hx of osteoporosis, no lack of exercise, no menopause, not obese, not pregnant, no recent surgery, no steroid use and no vascular disease        Review of Systems   Constitutional: Negative for fever and weight loss.   Cardiovascular: Negative for chest pain.   Gastrointestinal: Negative for abdominal pain and bowel incontinence.   Genitourinary: Negative for bladder incontinence, dysuria and pelvic pain.   Musculoskeletal: Positive for back pain.   Neurological: Positive for tingling and paresthesias. Negative for weakness, numbness and headaches.   All other systems reviewed and are negative.      Past Medical History:   Diagnosis Date   • Diabetes mellitus (CMS/[facility])    • Infection        Allergies   Allergen Reactions   • Lisinopril Swelling     FACE SWELLS   • Erythromycin Rash       Past Surgical History:   Procedure Laterality Date   • CARPAL TUNNEL RELEASE     • TONSILLECTOMY     • TUBAL ABDOMINAL LIGATION         No family history on file.    Social History     Socioeconomic History   • Marital status:      Spouse name: Not on file   • Number of children: Not on file   • Years of education: Not on file   • Highest education level: Not on file   Tobacco Use   • Smoking status: Never Smoker   • Smokeless tobacco: Never Used   Substance and Sexual Activity   • Alcohol use: No     Frequency: Never   • Drug use: No   • Sexual activity: Defer           Objective   Physical Exam   Constitutional: She is oriented to person, place, and time. She appears well-developed and well-nourished.   Eyes: Conjunctivae are normal.   Neck: Normal range of motion.   Cardiovascular: Normal rate, regular rhythm and normal heart sounds.   Pulmonary/Chest: Effort normal and breath sounds normal.   Musculoskeletal:        Back:    Neurological: She  is alert and oriented to person, place, and time.   Skin: Skin is warm and dry. Capillary refill takes less than 2 seconds.   Psychiatric: She has a normal mood and affect.   Nursing note and vitals reviewed.      Procedures           ED Course  ED Course as of Feb 05 2024   Wed Feb 05, 2020 2021 I reviewed the patient's CT scan and lab work.  I did discuss the patient's case with Dr. Paredes.  She may follow-up with him at his office.    [LF]   2021 At this time I discussed all results with the patient and family.  The patient is currently neurologically intact.  The patient does have Percocet at home which have advised her to use.  She is out of her muscle relaxer so I will give her a short course of Flexeril.  Patient is a type II diabetic however she reports that her blood sugars are well controlled from 100-1 15 when she checks these.  She checks these every day.  I am to give her a Medrol Dosepak.  I have advised her that this can raise her blood sugars.  Advised her to keep a close check on her blood sugars at home to check at least twice daily.  I advised her that if her blood sugars begin to become elevated to talk with her primary care provider about possible adjustments in her medication.  At this time she be discharged home in stable condition advised return to the ER for any new or worsening symptoms.    [LF]      ED Course User Index  [LF] Sofia Callahan, APRN                                       CT Lumbar Spine Without Contrast   Final Result   1. No evidence of acute fracture.   2. Postoperative and degenerative changes, as described.   3. Atheromatous disease of the aortoiliac vessels.       The full report of this exam was immediately signed and available to the   emergency room. The patient is currently in the emergency room.   This report was finalized on 02/05/2020 20:04 by Dr. Joe Oneill MD.        Labs Reviewed   BASIC METABOLIC PANEL - Abnormal; Notable for the following components:        Result Value    Glucose 204 (*)     All other components within normal limits    Narrative:     GFR Normal >60  Chronic Kidney Disease <60  Kidney Failure <15     CBC WITH AUTO DIFFERENTIAL - Abnormal; Notable for the following components:    Eosinophil % 8.3 (*)     Lymphocytes, Absolute 3.66 (*)     Monocytes, Absolute 1.09 (*)     Eosinophils, Absolute 0.83 (*)     All other components within normal limits   URINALYSIS W/ CULTURE IF INDICATED - Abnormal; Notable for the following components:    Specific Gravity, UA >1.030 (*)     Glucose, UA >=1000 mg/dL (3+) (*)     Urobilinogen, UA 2.0 E.U./dL (*)     All other components within normal limits    Narrative:     Urine microscopic not indicated.   SEDIMENTATION RATE - Normal   C-REACTIVE PROTEIN - Normal   CBC AND DIFFERENTIAL    Narrative:     The following orders were created for panel order CBC & Differential.  Procedure                               Abnormality         Status                     ---------                               -----------         ------                     CBC Auto Differential[[id number]]        Abnormal            Final result                 Please view results for these tests on the individual orders.          MDM  Number of Diagnoses or Management Options  Bulging lumbar disc: new and requires workup     Amount and/or Complexity of Data Reviewed  Clinical lab tests: ordered and reviewed  Tests in the radiology section of CPT®: ordered and reviewed  Discuss the patient with other providers: yes    Patient Progress  Patient progress: stable      Final diagnoses:   Bulging lumbar disc            Sofia Callahan, APRN  02/05/20 2024

## 2020-02-28 ENCOUNTER — TRANSCRIBE ORDERS (OUTPATIENT)
Dept: ADMINISTRATIVE | Facility: HOSPITAL | Age: 60
End: 2020-02-28

## 2020-02-28 DIAGNOSIS — M54.50 LOW BACK PAIN, UNSPECIFIED BACK PAIN LATERALITY, UNSPECIFIED CHRONICITY, UNSPECIFIED WHETHER SCIATICA PRESENT: Primary | ICD-10-CM

## 2020-03-03 ENCOUNTER — TELEPHONE (OUTPATIENT)
Dept: NEUROSURGERY | Facility: CLINIC | Age: 60
End: 2020-03-03

## 2020-03-03 NOTE — TELEPHONE ENCOUNTER
CALLED TO RESCHEDULE NO SHOW APPT WITH JOVANY PALAFOX. PATIENT STATED SHE IS DOING WELL AND DOES NOT NEED TO RESCHEDULE AT THIS TIME.

## 2020-03-13 ENCOUNTER — HOSPITAL ENCOUNTER (OUTPATIENT)
Dept: CT IMAGING | Facility: HOSPITAL | Age: 60
Discharge: HOME | End: 2020-03-13

## 2020-03-13 DIAGNOSIS — M54.50 LOW BACK PAIN, UNSPECIFIED BACK PAIN LATERALITY, UNSPECIFIED CHRONICITY, UNSPECIFIED WHETHER SCIATICA PRESENT: ICD-10-CM

## 2020-03-13 PROCEDURE — 72131 CT LUMBAR SPINE W/O DYE: CPT

## 2020-11-09 ENCOUNTER — LAB (OUTPATIENT)
Dept: LAB | Facility: HOSPITAL | Age: 60
End: 2020-11-09

## 2020-11-09 ENCOUNTER — OFFICE VISIT (OUTPATIENT)
Dept: INTERNAL MEDICINE | Facility: CLINIC | Age: 60
End: 2020-11-09

## 2020-11-09 VITALS
DIASTOLIC BLOOD PRESSURE: 80 MMHG | HEART RATE: 74 BPM | BODY MASS INDEX: 31 KG/M2 | RESPIRATION RATE: 16 BRPM | WEIGHT: 192.9 LBS | TEMPERATURE: 98.5 F | HEIGHT: 66 IN | OXYGEN SATURATION: 98 % | SYSTOLIC BLOOD PRESSURE: 108 MMHG

## 2020-11-09 DIAGNOSIS — E11.9 TYPE 2 DIABETES MELLITUS WITHOUT COMPLICATION, WITH LONG-TERM CURRENT USE OF INSULIN (HCC): Primary | ICD-10-CM

## 2020-11-09 DIAGNOSIS — Z11.59 ENCOUNTER FOR HEPATITIS C SCREENING TEST FOR LOW RISK PATIENT: ICD-10-CM

## 2020-11-09 DIAGNOSIS — E78.5 HYPERLIPIDEMIA, UNSPECIFIED HYPERLIPIDEMIA TYPE: ICD-10-CM

## 2020-11-09 DIAGNOSIS — Z79.4 TYPE 2 DIABETES MELLITUS WITHOUT COMPLICATION, WITH LONG-TERM CURRENT USE OF INSULIN (HCC): Primary | ICD-10-CM

## 2020-11-09 DIAGNOSIS — E11.9 TYPE 2 DIABETES MELLITUS WITHOUT COMPLICATION, WITH LONG-TERM CURRENT USE OF INSULIN (HCC): ICD-10-CM

## 2020-11-09 DIAGNOSIS — R53.83 FATIGUE, UNSPECIFIED TYPE: ICD-10-CM

## 2020-11-09 DIAGNOSIS — Z79.4 TYPE 2 DIABETES MELLITUS WITHOUT COMPLICATION, WITH LONG-TERM CURRENT USE OF INSULIN (HCC): ICD-10-CM

## 2020-11-09 DIAGNOSIS — I10 ESSENTIAL HYPERTENSION: ICD-10-CM

## 2020-11-09 DIAGNOSIS — K59.01 SLOW TRANSIT CONSTIPATION: ICD-10-CM

## 2020-11-09 PROBLEM — G89.29 CHRONIC LEFT-SIDED LOW BACK PAIN WITH LEFT-SIDED SCIATICA: Status: ACTIVE | Noted: 2020-11-09

## 2020-11-09 PROBLEM — M54.42 CHRONIC LEFT-SIDED LOW BACK PAIN WITH LEFT-SIDED SCIATICA: Status: ACTIVE | Noted: 2020-11-09

## 2020-11-09 LAB
ALBUMIN SERPL-MCNC: 4.3 G/DL (ref 3.5–5.2)
ALBUMIN/GLOB SERPL: 1.6 G/DL
ALP SERPL-CCNC: 84 U/L (ref 39–117)
ALT SERPL W P-5'-P-CCNC: 26 U/L (ref 1–33)
ANION GAP SERPL CALCULATED.3IONS-SCNC: 11 MMOL/L (ref 5–15)
AST SERPL-CCNC: 30 U/L (ref 1–32)
BILIRUB SERPL-MCNC: 0.3 MG/DL (ref 0–1.2)
BUN SERPL-MCNC: 15 MG/DL (ref 6–20)
BUN/CREAT SERPL: 21.1 (ref 7–25)
CALCIUM SPEC-SCNC: 9.9 MG/DL (ref 8.6–10.5)
CHLORIDE SERPL-SCNC: 109 MMOL/L (ref 98–107)
CO2 SERPL-SCNC: 24 MMOL/L (ref 22–29)
CREAT SERPL-MCNC: 0.71 MG/DL (ref 0.57–1)
GFR SERPL CREATININE-BSD FRML MDRD: 85 ML/MIN/1.73
GLOBULIN UR ELPH-MCNC: 2.7 GM/DL
GLUCOSE SERPL-MCNC: 107 MG/DL (ref 65–99)
POTASSIUM SERPL-SCNC: 3.9 MMOL/L (ref 3.5–5.2)
PROT SERPL-MCNC: 7 G/DL (ref 6–8.5)
SODIUM SERPL-SCNC: 144 MMOL/L (ref 136–145)

## 2020-11-09 PROCEDURE — 36415 COLL VENOUS BLD VENIPUNCTURE: CPT

## 2020-11-09 PROCEDURE — 82570 ASSAY OF URINE CREATININE: CPT

## 2020-11-09 PROCEDURE — 80053 COMPREHEN METABOLIC PANEL: CPT

## 2020-11-09 PROCEDURE — 82043 UR ALBUMIN QUANTITATIVE: CPT

## 2020-11-09 PROCEDURE — 84443 ASSAY THYROID STIM HORMONE: CPT

## 2020-11-09 PROCEDURE — 83036 HEMOGLOBIN GLYCOSYLATED A1C: CPT

## 2020-11-09 PROCEDURE — 80061 LIPID PANEL: CPT

## 2020-11-09 PROCEDURE — 99204 OFFICE O/P NEW MOD 45 MIN: CPT | Performed by: INTERNAL MEDICINE

## 2020-11-09 PROCEDURE — 86803 HEPATITIS C AB TEST: CPT

## 2020-11-09 RX ORDER — EMPAGLIFLOZIN 25 MG/1
25 TABLET, FILM COATED ORAL DAILY
Qty: 30 TABLET | Refills: 5 | Status: SHIPPED | OUTPATIENT
Start: 2020-11-09 | End: 2021-04-27

## 2020-11-09 RX ORDER — LOSARTAN POTASSIUM 50 MG/1
50 TABLET ORAL DAILY
Qty: 30 TABLET | Refills: 5 | Status: SHIPPED | OUTPATIENT
Start: 2020-11-09 | End: 2021-06-07

## 2020-11-09 RX ORDER — POLYETHYLENE GLYCOL 3350 17 G/17G
17 POWDER, FOR SOLUTION ORAL DAILY
Qty: 30 PACKET | Refills: 5 | Status: SHIPPED | OUTPATIENT
Start: 2020-11-09 | End: 2022-02-23 | Stop reason: SDUPTHER

## 2020-11-09 RX ORDER — ROSUVASTATIN CALCIUM 20 MG/1
20 TABLET, COATED ORAL DAILY
Qty: 30 TABLET | Refills: 5 | Status: SHIPPED | OUTPATIENT
Start: 2020-11-09 | End: 2021-06-07

## 2020-11-09 RX ORDER — CYCLOBENZAPRINE HCL 10 MG
10 TABLET ORAL 2 TIMES DAILY PRN
COMMUNITY

## 2020-11-09 NOTE — PROGRESS NOTES
"Chief Complaint   Patient presents with   • Establish Care   • Diabetes   • Bowel Movement Problem     Has to use enemas         History:  Soledad Ram is a 60 y.o. female who presents today for evaluation of the above problems.    She presents today to establish care.  She used to see Sayra Fierro in [place]  But she lives in [place].  She has type 2 diabetes on insulin, hyperlipidemia, hypertension, chronic low back pain with left-sided sciatica among other issues.    She reports recently her sugars dropped to 80s to 90s fasting.  She has felt fatigued does not feel like doing anything.  Her  recently had a stroke and she has been eating better and exercising.  Her last A1c was around 8 6 to 7 months ago.  Denies any highs.  She is on Soliqua, Metformin XR 1000 mg daily, Jardiance 25 mg daily.  She has decreased her Soliqua dose from 30 units to 16 units which has helped her hypoglycemic episodes.    Despite exercising and eating better she has not lost any weight.    She takes Cymbalta and Lyrica for nerve pain in the left leg prescribed by her neurologist, Britney Kuhn    She takes trazodone for sleep which helps.    Blood pressure runs low at home, sometimes 80s or 90s systolic.    She reports having a \"mild heart attack\" previously but left heart cath did not show any coronary artery disease    She sees Dr. Meier for her chronic low back pain and he prescribes Flexeril and Percocet    She will develop constipation at times sometimes needing an enema    Her last mammogram was March at    She had influenza vaccine October 1, 2020 and her first Shingrix vaccine at that time as well.         HPI    ROS:  Review of Systems   Constitutional: Positive for fatigue. Negative for activity change, appetite change, fever and unexpected weight change.   HENT: Negative for nosebleeds, sore throat and trouble swallowing.    Eyes: Negative for pain and visual disturbance.   Respiratory: Negative for cough, " chest tightness and shortness of breath.    Cardiovascular: Negative for chest pain, palpitations and leg swelling.   Gastrointestinal: Positive for constipation. Negative for abdominal pain, diarrhea, nausea and vomiting.   Endocrine: Negative for cold intolerance and heat intolerance.   Genitourinary: Negative for hematuria.   Musculoskeletal: Positive for back pain. Negative for neck pain and neck stiffness.   Skin: Negative for rash and wound.   Neurological: Positive for numbness. Negative for dizziness, syncope, weakness, light-headedness and headaches.   Hematological: Negative for adenopathy. Does not bruise/bleed easily.   Psychiatric/Behavioral: Negative for agitation, behavioral problems, confusion and sleep disturbance. The patient is nervous/anxious.        Allergies   Allergen Reactions   • Lisinopril Swelling     FACE SWELLS   • Erythromycin Rash     Past Medical History:   Diagnosis Date   • Diabetes mellitus (CMS/HCC)    • Infection      Past Surgical History:   Procedure Laterality Date   • CARPAL TUNNEL RELEASE     • TONSILLECTOMY     • TUBAL ABDOMINAL LIGATION       Family History   Problem Relation Age of Onset   • Cancer Mother    • Cancer Father    • Cancer Sister    • Heart disease Paternal Grandfather      Soledad  reports that she has never smoked. She has never used smokeless tobacco. She reports that she does not drink alcohol or use drugs.    I have reviewed and updated the above documentation (if necessary) including but not limited to chief complaint, ROS, PFSH, allergies and medications        Current Outpatient Medications:   •  cyclobenzaprine (FLEXERIL) 10 MG tablet, Take 10 mg by mouth 2 (Two) Times a Day As Needed for Muscle Spasms., Disp: , Rfl:   •  DULoxetine (CYMBALTA) 60 MG capsule, Take 120 mg by mouth., Disp: , Rfl:   •  Empagliflozin (Jardiance) 25 MG tablet, Take 25 mg by mouth Daily., Disp: 30 tablet, Rfl: 5  •  Insulin Glargine-Lixisenatide (SOLIQUA) 100-33 UNT-MCG/ML  "solution pen-injector injection, Inject 20 Units under the skin into the appropriate area as directed., Disp: , Rfl:   •  losartan (COZAAR) 50 MG tablet, Take 1 tablet by mouth Daily., Disp: 30 tablet, Rfl: 5  •  metFORMIN ER (GLUCOPHAGE-XR) 500 MG 24 hr tablet, Take 1,000 mg by mouth., Disp: , Rfl:   •  oxyCODONE-acetaminophen (PERCOCET)  MG per tablet, Take 1 tablet by mouth Every 6 (Six) Hours As Needed for Moderate Pain ., Disp: , Rfl:   •  pregabalin (Lyrica) 300 MG capsule, Take 300 mg by mouth 2 (Two) Times a Day., Disp: , Rfl:   •  rosuvastatin (CRESTOR) 20 MG tablet, Take 1 tablet by mouth Daily., Disp: 30 tablet, Rfl: 5  •  traZODone (DESYREL) 50 MG tablet, Take 150 mg by mouth., Disp: , Rfl:   •  polyethylene glycol (MIRALAX) 17 g packet, Take 17 g by mouth Daily., Disp: 30 packet, Rfl: 5      OBJECTIVE:  Visit Vitals  /80 (BP Location: Left arm, Patient Position: Sitting, Cuff Size: Adult)   Pulse 74   Temp 98.5 °F (36.9 °C) (Oral)   Resp 16   Ht 167.6 cm (65.98\")   Wt 87.5 kg (192 lb 14.4 oz)   SpO2 98%   BMI 31.15 kg/m²      Physical Exam  Constitutional:       General: She is not in acute distress.     Appearance: She is well-developed. She is obese.      Comments: Pleasant     HENT:      Head: Normocephalic and atraumatic.      Mouth/Throat:      Pharynx: No oropharyngeal exudate.   Eyes:      General: No scleral icterus.     Conjunctiva/sclera: Conjunctivae normal.      Pupils: Pupils are equal, round, and reactive to light.   Neck:      Musculoskeletal: Normal range of motion and neck supple.      Thyroid: No thyromegaly.      Vascular: No JVD.   Cardiovascular:      Rate and Rhythm: Normal rate and regular rhythm.      Heart sounds: Normal heart sounds. No murmur. No friction rub. No gallop.    Pulmonary:      Effort: Pulmonary effort is normal.      Breath sounds: Normal breath sounds. No stridor. No wheezing or rales.   Abdominal:      General: Bowel sounds are normal. There is no " distension.      Palpations: Abdomen is soft.      Tenderness: There is no abdominal tenderness. There is no guarding or rebound.   Musculoskeletal:         General: No tenderness.   Lymphadenopathy:      Cervical: No cervical adenopathy.   Skin:     General: Skin is warm and dry.      Coloration: Skin is not jaundiced or pale.      Findings: No bruising or erythema.   Neurological:      General: No focal deficit present.      Mental Status: She is alert and oriented to person, place, and time.      Cranial Nerves: No cranial nerve deficit.   Psychiatric:         Mood and Affect: Mood normal.         Behavior: Behavior normal.         Thought Content: Thought content normal.         Judgment: Judgment normal.         [facility]    Assessment/Plan    Diagnoses and all orders for this visit:    1. Type 2 diabetes mellitus without complication, with long-term current use of insulin (CMS/[facility]) (Primary)  -     Hemoglobin A1c; Future  -     Lipid Panel; Future  -     Microalbumin / Creatinine Urine Ratio - Urine, Clean Catch; Future  -     Empagliflozin (Jardiance) 25 MG tablet; Take 25 mg by mouth Daily.  Dispense: 30 tablet; Refill: 5    2. Essential hypertension  -     losartan (COZAAR) 50 MG tablet; Take 1 tablet by mouth Daily.  Dispense: 30 tablet; Refill: 5    3. Hyperlipidemia, unspecified hyperlipidemia type  -     Comprehensive Metabolic Panel; Future  -     Lipid Panel; Future    4. Encounter for hepatitis C screening test for low risk patient  -     Hepatitis C Antibody; Future    5. Slow transit constipation  -     polyethylene glycol (MIRALAX) 17 g packet; Take 17 g by mouth Daily.  Dispense: 30 packet; Refill: 5    6. Fatigue, unspecified type  -     TSH; Future    Other orders  -     rosuvastatin (CRESTOR) 20 MG tablet; Take 1 tablet by mouth Daily.  Dispense: 30 tablet; Refill: 5      For her diabetes we will need to obtain further work-up with A1c.  Based on her home glucose levels I suspect she will be under  good control.  She was having hypoglycemic episodes and already has decreased her Soliqua from 30 units to 16 units.  We will continue her current regimen of Metformin 1000 mg, Soliqua 16 units daily and Jardiance 25 mg daily.  Further adjustments based on symptoms, blood sugar levels and A1c    Her blood pressure will continue her losartan 50 mg daily.  Given her fatigue and borderline low blood pressure we will stop her atenolol 25 mg daily.  No clear indication for beta-blocker at this time.  If she develops high blood pressure then I will titrate up the losartan    Continue Crestor 20 mg and check lipid panel and CMP    Constipation related to her Percocet and she will take daily MiraLAX.  Continue as needed medications    She has chronic low back pain with left-sided sciatica.  Dr. Ribera prescribes Flexeril and Percocet and Britney Buckner prescribes her Cymbalta and Lyrica.    Trazodone helps sleep and will continue this    He had influenza vaccine already this year    First Shingrix vaccine on October 1, 2020    Education materials and an After Visit Summary were printed and given to the patient at discharge.  Return in about 3 months (around 2/9/2021) for Recheck [facility] A1c.         AURORA Sarabia MD   13:26 CST  11/9/2020

## 2020-11-10 ENCOUNTER — TELEPHONE (OUTPATIENT)
Dept: INTERNAL MEDICINE | Facility: CLINIC | Age: 60
End: 2020-11-10

## 2020-11-10 LAB
ALBUMIN UR-MCNC: <1.2 MG/DL
CHOLEST SERPL-MCNC: 123 MG/DL (ref 0–200)
CREAT UR-MCNC: 92.4 MG/DL
HBA1C MFR BLD: 7.72 % (ref 4.8–5.6)
HCV AB SER DONR QL: NORMAL
HDLC SERPL-MCNC: 44 MG/DL (ref 40–60)
LDLC SERPL CALC-MCNC: 53 MG/DL (ref 0–100)
LDLC/HDLC SERPL: 1.1 {RATIO}
MICROALBUMIN/CREAT UR: NORMAL MG/G{CREAT}
TRIGL SERPL-MCNC: 154 MG/DL (ref 0–150)
TSH SERPL DL<=0.05 MIU/L-ACNC: 1.97 UIU/ML (ref 0.27–4.2)
VLDLC SERPL-MCNC: 26 MG/DL (ref 5–40)

## 2020-11-10 NOTE — TELEPHONE ENCOUNTER
----- Message from KIRTI Sarabia MD sent at 11/10/2020  7:48 AM CST -----  Can you call patient to notify them of labs?  Her thyroid hormone, cholesterol, metabolic panel, and hepatitis C antibody were all normal.  Her A1c is 7.7 and we should continue her current regimen for diabetes.  We will recheck at the next visit in 3 months.  Thanks

## 2020-12-29 ENCOUNTER — TELEPHONE (OUTPATIENT)
Dept: INTERNAL MEDICINE | Facility: CLINIC | Age: 60
End: 2020-12-29

## 2020-12-29 DIAGNOSIS — N64.59 OTHER SIGNS AND SYMPTOMS IN BREAST: ICD-10-CM

## 2020-12-29 DIAGNOSIS — N63.20 LEFT BREAST LUMP: Primary | ICD-10-CM

## 2020-12-29 NOTE — TELEPHONE ENCOUNTER
PT CALLED REQUESTING A REFERRAL TO AN OB/GYN, STATES SHE HAS FOUND A LUMP IN HER LEFT BREAST    PT WOULD PREFER A FEMALE OB/GYN HERE IN [place] IF POSSIBLE    CALLBACK [phone]

## 2020-12-30 DIAGNOSIS — N63.20 LEFT BREAST LUMP: Primary | ICD-10-CM

## 2020-12-30 DIAGNOSIS — N64.89 OTHER SPECIFIED DISORDERS OF BREAST: ICD-10-CM

## 2020-12-30 NOTE — TELEPHONE ENCOUNTER
Patient is going to hold off on the OBGYN . She stated that she was due for a pap, I told her that Amanda can do those if she would like to do that. She stated that was fine. She has been scheduled and I transferred her down to the scheduling department for the mammogram.

## 2021-01-04 ENCOUNTER — OFFICE VISIT (OUTPATIENT)
Dept: INTERNAL MEDICINE | Facility: CLINIC | Age: 61
End: 2021-01-04

## 2021-01-04 VITALS
HEIGHT: 66 IN | BODY MASS INDEX: 31.52 KG/M2 | OXYGEN SATURATION: 97 % | DIASTOLIC BLOOD PRESSURE: 90 MMHG | SYSTOLIC BLOOD PRESSURE: 142 MMHG | TEMPERATURE: 98.4 F | HEART RATE: 88 BPM | WEIGHT: 196.1 LBS

## 2021-01-04 DIAGNOSIS — Z12.4 PAP SMEAR FOR CERVICAL CANCER SCREENING: ICD-10-CM

## 2021-01-04 DIAGNOSIS — N63.20 LEFT BREAST LUMP: ICD-10-CM

## 2021-01-04 DIAGNOSIS — Z00.00 ENCOUNTER FOR ANNUAL WELLNESS VISIT (AWV) IN MEDICARE PATIENT: Primary | ICD-10-CM

## 2021-01-04 PROBLEM — Z86.010 HISTORY OF COLONIC POLYPS: Status: ACTIVE | Noted: 2019-11-19

## 2021-01-04 PROBLEM — E83.42 HYPOMAGNESEMIA: Status: ACTIVE | Noted: 2021-01-04

## 2021-01-04 PROBLEM — IMO0002 UNCONTROLLED TYPE 2 DIABETES MELLITUS: Status: ACTIVE | Noted: 2021-01-04

## 2021-01-04 PROBLEM — Z86.0100 HISTORY OF COLONIC POLYPS: Status: ACTIVE | Noted: 2019-11-19

## 2021-01-04 PROBLEM — K22.2 STRICTURE OF ESOPHAGUS: Status: ACTIVE | Noted: 2019-11-19

## 2021-01-04 PROBLEM — G47.00 INSOMNIA: Status: ACTIVE | Noted: 2021-01-04

## 2021-01-04 PROBLEM — Z78.0 POSTMENOPAUSAL STATE: Status: ACTIVE | Noted: 2017-02-01

## 2021-01-04 PROBLEM — E78.6 HYPOALPHALIPOPROTEINEMIA: Status: ACTIVE | Noted: 2021-01-04

## 2021-01-04 PROBLEM — D72.829 LEUKOCYTOSIS: Status: ACTIVE | Noted: 2019-11-19

## 2021-01-04 PROBLEM — R80.9 MICROALBUMINURIA: Status: ACTIVE | Noted: 2018-05-16

## 2021-01-04 PROBLEM — K59.09 CHRONIC CONSTIPATION: Status: ACTIVE | Noted: 2017-11-06

## 2021-01-04 PROBLEM — F32.A DEPRESSIVE DISORDER: Status: ACTIVE | Noted: 2021-01-04

## 2021-01-04 PROBLEM — G62.9 NEUROPATHY: Status: ACTIVE | Noted: 2021-01-04

## 2021-01-04 PROBLEM — Z79.4 ENCOUNTER FOR LONG-TERM (CURRENT) USE OF INSULIN: Status: ACTIVE | Noted: 2021-01-04

## 2021-01-04 PROCEDURE — G0123 SCREEN CERV/VAG THIN LAYER: HCPCS | Performed by: NURSE PRACTITIONER

## 2021-01-04 PROCEDURE — 1170F FXNL STATUS ASSESSED: CPT | Performed by: NURSE PRACTITIONER

## 2021-01-04 PROCEDURE — 1126F AMNT PAIN NOTED NONE PRSNT: CPT | Performed by: NURSE PRACTITIONER

## 2021-01-04 PROCEDURE — G0438 PPPS, INITIAL VISIT: HCPCS | Performed by: NURSE PRACTITIONER

## 2021-01-04 PROCEDURE — 96160 PT-FOCUSED HLTH RISK ASSMT: CPT | Performed by: NURSE PRACTITIONER

## 2021-01-04 PROCEDURE — 1159F MED LIST DOCD IN RCRD: CPT | Performed by: NURSE PRACTITIONER

## 2021-01-04 PROCEDURE — 87624 HPV HI-RISK TYP POOLED RSLT: CPT | Performed by: NURSE PRACTITIONER

## 2021-01-04 NOTE — PROGRESS NOTES
The ABCs of the Annual Wellness Visit  Initial Medicare Wellness Visit    Chief Complaint   Patient presents with   • Breast Problem     patient has a sore area on left breast.        Subjective   History of Present Illness:  Soledad Ram is a 61 y.o. female who presents for an Initial Medicare Wellness Visit.  She is also here for a Pap smear.  Gives a history of cervical biopsy about 30 years ago following an abnormal Pap smear but has not had any problems since.  She has been menopausal since age 30.  No bleeding.  Does have some vaginal dryness.    Is scheduled for mammogram this Friday.  She has noticed a lump in her left breast and a mammogram and left breast ultrasound have been ordered.    States she had a colonoscopy 2 to 3 years ago in [place].  I have unable to find the results of that on chart at this time.  She says it was normal and she is not sure when she is supposed to have another one.    HEALTH RISK ASSESSMENT    Recent Hospitalizations:  No hospitalization(s) within the last year.    Current Medical Providers:  Patient Care Team:  KIRTI Sarabia MD as PCP - General (Internal Medicine)    Smoking Status:  Social History     Tobacco Use   Smoking Status Never Smoker   Smokeless Tobacco Never Used       Alcohol Consumption:  Social History     Substance and Sexual Activity   Alcohol Use No   • Frequency: Never       Depression Screen:   PHQ-2/PHQ-9 Depression Screening 1/4/2021   Little interest or pleasure in doing things 0   Feeling down, depressed, or hopeless 3   Trouble falling or staying asleep, or sleeping too much 0   Feeling tired or having little energy 2   Poor appetite or overeating 2   Feeling bad about yourself - or that you are a failure or have let yourself or your family down 0   Trouble concentrating on things, such as reading the newspaper or watching television 0   Moving or speaking so slowly that other people could have noticed. Or the opposite - being so fidgety or  restless that you have been moving around a lot more than usual 0   Thoughts that you would be better off dead, or of hurting yourself in some way 0   Total Score 7   If you checked off any problems, how difficult have these problems made it for you to do your work, take care of things at home, or get along with other people? Not difficult at all       Fall Risk Screen:  REBEKAH Fall Risk Assessment was completed, and patient is at LOW risk for falls.Assessment completed on:1/4/2021    Health Habits and Functional and Cognitive Screening:  Functional & Cognitive Status 1/4/2021   Do you have difficulty preparing food and eating? No   Do you have difficulty bathing yourself, getting dressed or grooming yourself? No   Do you have difficulty using the toilet? No   Do you have difficulty moving around from place to place? No   Do you have trouble with steps or getting out of a bed or a chair? No   Current Diet Well Balanced Diet   Dental Exam Up to date   Eye Exam Up to date   Exercise (times per week) 7 times per week   Current Exercises Include Weightlifting   Do you need help using the phone?  No   Are you deaf or do you have serious difficulty hearing?  No   Do you need help with transportation? No   Do you need help shopping? No   Do you need help preparing meals?  No   Do you need help with housework?  No   Do you need help with laundry? No   Do you need help taking your medications? No   Do you need help managing money? No   Do you ever drive or ride in a car without wearing a seat belt? No   Have you felt unusual stress, anger or loneliness in the last month? No   Who do you live with? Spouse   If you need help, do you have trouble finding someone available to you? No   Have you been bothered in the last four weeks by sexual problems? Yes   Do you have difficulty concentrating, remembering or making decisions? No         Does the patient have evidence of cognitive impairment? No    Asprin use counseling:Does not  need ASA (and currently is not on it)  The ASCVD Risk score (Patriciatu KIRKLAND Jr., et al., 2013) failed to calculate for the following reasons:    The valid total cholesterol range is 130 to 320 mg/dL    Age-appropriate Screening Schedule:  Refer to the list below for future screening recommendations based on patient's age, sex and/or medical conditions. Orders for these recommended tests are listed in the plan section. The patient has been provided with a written plan.    Health Maintenance   Topic Date Due   • MAMMOGRAM  1960   • COLONOSCOPY  1960   • ZOSTER VACCINE (1 of 2) 01/07/2012   • DIABETIC FOOT EXAM  03/07/2019   • DIABETIC EYE EXAM  01/31/2021   • HEMOGLOBIN A1C  05/09/2021   • LIPID PANEL  11/09/2021   • URINE MICROALBUMIN  11/09/2021   • PAP SMEAR  01/04/2024   • TDAP/TD VACCINES (2 - Td) 08/14/2029   • INFLUENZA VACCINE  Completed          The following portions of the patient's history were reviewed and updated as appropriate: allergies, current medications, past medical history, past social history, past surgical history and problem list.    Outpatient Medications Prior to Visit   Medication Sig Dispense Refill   • cyclobenzaprine (FLEXERIL) 10 MG tablet Take 10 mg by mouth 2 (Two) Times a Day As Needed for Muscle Spasms.     • DULoxetine (CYMBALTA) 60 MG capsule Take 120 mg by mouth.     • Empagliflozin (Jardiance) 25 MG tablet Take 25 mg by mouth Daily. 30 tablet 5   • Insulin Glargine-Lixisenatide (SOLIQUA) 100-33 UNT-MCG/ML solution pen-injector injection Inject 20 Units under the skin into the appropriate area as directed.     • losartan (COZAAR) 50 MG tablet Take 1 tablet by mouth Daily. 30 tablet 5   • metFORMIN ER (GLUCOPHAGE-XR) 500 MG 24 hr tablet Take 1,000 mg by mouth.     • oxyCODONE-acetaminophen (PERCOCET)  MG per tablet Take 1 tablet by mouth Every 6 (Six) Hours As Needed for Moderate Pain .     • polyethylene glycol (MIRALAX) 17 g packet Take 17 g by mouth Daily. 30 packet  5   • pregabalin (Lyrica) 300 MG capsule Take 300 mg by mouth 2 (Two) Times a Day.     • rosuvastatin (CRESTOR) 20 MG tablet Take 1 tablet by mouth Daily. 30 tablet 5   • traZODone (DESYREL) 50 MG tablet Take 150 mg by mouth.       No facility-administered medications prior to visit.        Patient Active Problem List   Diagnosis   • Obesity (BMI 30.0-34.9)   • Hyperlipidemia   • Essential hypertension   • Type 2 diabetes mellitus without complication, with long-term current use of insulin (CMS/HCC)   • Chronic left-sided low back pain with left-sided sciatica   • Chronic constipation   • DDD (degenerative disc disease), lumbar   • Depressive disorder   • History of colonic polyps   • Hypoalphalipoproteinemia   • Hypomagnesemia   • Insomnia   • Leukocytosis   • Lumbar radiculopathy   • Microalbuminuria   • Neuropathy   • Other spondylosis with radiculopathy, lumbar region   • Postmenopausal state   • Encounter for long-term (current) use of insulin (CMS/[facility])   • Stricture of esophagus   • Uncontrolled type 2 diabetes mellitus (CMS/HCC)       Advanced Care Planning:  ACP discussion was held with the patient during this visit. Patient does not have an advance directive, information provided.    Review of Systems   Constitutional: Negative for activity change, appetite change and fever.   HENT: Negative for congestion.    Respiratory: Negative for cough, chest tightness, shortness of breath and wheezing.    Cardiovascular: Negative for leg swelling.   Gastrointestinal: Negative for abdominal pain, diarrhea, nausea and vomiting.   Genitourinary:        Vaginal dryness   Skin: Negative for rash.   Allergic/Immunologic: Negative for food allergies and immunocompromised state.       Compared to one year ago, the patient feels her physical health is the same.  Compared to one year ago, the patient feels her mental health is the same.    Reviewed chart for potential of high risk medication in the elderly: yes  Reviewed chart  "for potential of harmful drug interactions in the elderly:yes    Objective         Vitals:    01/04/21 0922   BP: 142/90   BP Location: Right arm   Patient Position: Sitting   Cuff Size: Adult   Pulse: 88   Temp: 98.4 °F (36.9 °C)   TempSrc: Oral   SpO2: 97%   Weight: 89 kg (196 lb 1.6 oz)   Height: 167.6 cm (66\")       Body mass index is 31.65 kg/m².  Discussed the patient's BMI with her. The BMI is above average; BMI management plan is completed.    Physical Exam  Vitals signs and nursing note reviewed.   Constitutional:       General: She is not in acute distress.     Appearance: Normal appearance. She is not ill-appearing, toxic-appearing or diaphoretic.   HENT:      Head: Normocephalic and atraumatic.   Abdominal:      Palpations: Abdomen is soft.      Tenderness: There is no abdominal tenderness.   Genitourinary:     General: Normal vulva.      Vagina: No vaginal discharge.      Rectum: Normal.      Comments: Cervical appearance normal, os constricted.  Bi-manual exam reveals no CMT, no mass appreciated.  Breast exam unremarkable in both sitting and lying positions.  She has no dimpling, erythema, or nipple discharge.  At 7-8 o'clock on the left breast, just distal to areola, there is a thickening, without discreet mass palpated, that is tender.  This is the area where she is feeling the lump.   Bilateral breasts with fibrotic changes and densities diffusely.  Musculoskeletal:         General: No swelling.      Right lower leg: No edema.      Left lower leg: No edema.   Skin:     General: Skin is warm and dry.      Coloration: Skin is not jaundiced or pale.      Findings: No bruising, erythema, lesion or rash.   Neurological:      General: No focal deficit present.      Mental Status: She is alert and oriented to person, place, and time.   Psychiatric:         Mood and Affect: Mood normal.         Behavior: Behavior normal.         Thought Content: Thought content normal.         Judgment: Judgment normal. "         Lab Results   Component Value Date    TRIG 154 (H) 11/09/2020    HDL 44 11/09/2020    LDL 53 11/09/2020    VLDL 26 11/09/2020    HGBA1C 7.72 (H) 11/09/2020        Assessment/Plan   Medicare Risks and Personalized Health Plan  CMS Preventative Services Quick Reference  Advance Directive Discussion  Breast Cancer/Mammogram Screening  Cardiovascular risk  Dementia/Memory   Depression/Dysphoria  Fall Risk    The above risks/problems have been discussed with the patient.  Pertinent information has been shared with the patient in the After Visit Summary.  Follow up plans and orders are seen below in the Assessment/Plan Section.    Diagnoses and all orders for this visit:    1. Encounter for annual wellness visit (AWV) in Medicare patient (Primary)    2. Left breast lump    3. Pap smear for cervical cancer screening  -     Liquid-based Pap Smear, Screening; Future  -     Liquid-based Pap Smear, Screening  -     HPV DNA Probe, Direct - ThinPrep Vial, Cervix      Follow Up:  Return for Next scheduled follow up.     An After Visit Summary and PPPS were given to the patient.

## 2021-01-04 NOTE — PROGRESS NOTES
"Chief Complaint  Breast Problem (patient has a sore area on left breast. )    Subjective    History of Present Illness {CC  Problem List  Visit Diagnosis   Encounters  Notes  Medications  Labs  Result Review Imaging  Media :23}     Soledad Ram presents to [facility] for   History of Present Illness     Objective   Vital Signs:   /90 (BP Location: Right arm, Patient Position: Sitting, Cuff Size: Adult)   Pulse 88   Temp 98.4 °F (36.9 °C) (Oral)   Ht 167.6 cm (66\")   Wt 89 kg (196 lb 1.6 oz)   SpO2 97%   BMI 31.65 kg/m²     Physical Exam   Result Review :{ Labs  Result Review  Imaging  Med Tab  Media :23}   {The following data was reviewed by (Optional):76394}  {Ambulatory Labs (Optional):13331}  {Data reviewed (Optional):62670:::1}          Assessment and Plan {CC Problem List  Visit Diagnosis  ROS  Review (Popup)  Health Maintenance  Quality  BestPractice  Medications  SmartSets  SnapShot Encounters  Media :23}   Problem List Items Addressed This Visit     None        {Time Spent (Optional):23129}  Follow Up {Instructions Charge Capture  Follow-up Communications :23}  No follow-ups on file.  Patient was given instructions and counseling regarding her condition or for health maintenance advice. Please see specific information pulled into the AVS if appropriate.       "

## 2021-01-04 NOTE — PATIENT INSTRUCTIONS
Medicare Wellness  Personal Prevention Plan of Service     Date of Office Visit:  2021  Encounter Provider:  MARTY Parsons  Place of Service:  [facility] INTERNAL MEDICINE  Patient Name: Soledad Ram  :  1960    As part of the Medicare Wellness portion of your visit today, we are providing you with this personalized preventive plan of services (PPPS). This plan is based upon recommendations of the [place] Preventive Services Task Force (USPSTF) and the Advisory Committee on Immunization Practices (ACIP).    This lists the preventive care services that should be considered, and provides dates of when you are due. Items listed as completed are up-to-date and do not require any further intervention.    Health Maintenance   Topic Date Due   • MAMMOGRAM  1960   • COLONOSCOPY  1960   • Pneumococcal Vaccine 0-64 (1 of 1 - PPSV23) 1968   • Hepatitis B (1 of 3 - Risk 3-dose series) 1981   • ZOSTER VACCINE (1 of 2) 2012   • ANNUAL WELLNESS VISIT  2019   • DIABETIC FOOT EXAM  2019   • PAP SMEAR  2019   • DIABETIC EYE EXAM  2019   • HEMOGLOBIN A1C  2021   • LIPID PANEL  2021   • URINE MICROALBUMIN  2021   • TDAP/TD VACCINES (2 - Td) 2029   • HEPATITIS C SCREENING  Completed   • INFLUENZA VACCINE  Completed   • MENINGOCOCCAL VACCINE  Aged Out       No orders of the defined types were placed in this encounter.      No follow-ups on file.        Advance Directive    Advance directives are legal documents that let you make choices ahead of time about your health care and medical treatment in case you become unable to communicate for yourself. Advance directives are a way for you to make known your wishes to family, friends, and health care providers. This can let others know about your end-of-life care if you become unable to communicate.  Discussing and writing advance directives should happen over time  rather than all at once. Advance directives can be changed depending on your situation and what you want, even after you have signed the advance directives.  There are different types of advance directives, such as:  · Medical power of .  · Living will.  · Do not resuscitate (DNR) or do not attempt resuscitation (DNAR) order.  Health care proxy and medical power of   A health care proxy is also called a health care agent. This is a person who is appointed to make medical decisions for you in cases where you are unable to make the decisions yourself. Generally, people choose someone they know well and trust to represent their preferences. Make sure to ask this person for an agreement to act as your proxy. A proxy may have to exercise judgment in the event of a medical decision for which your wishes are not known.  A medical power of  is a legal document that names your health care proxy. Depending on the laws in your state, after the document is written, it may also need to be:  · Signed.  · Notarized.  · Dated.  · Copied.  · Witnessed.  · Incorporated into your medical record.  You may also want to appoint someone to manage your money in a situation in which you are unable to do so. This is called a durable power of  for finances. It is a separate legal document from the durable power of  for health care. You may choose the same person or someone different from your health care proxy to act as your agent in money matters.  If you do not appoint a proxy, or if there is a concern that the proxy is not acting in your best interests, a court may appoint a guardian to act on your behalf.  Living will  A living will is a set of instructions that state your wishes about medical care when you cannot express them yourself. Health care providers should keep a copy of your living will in your medical record. You may want to give a copy to family members or friends. To alert caregivers in  case of an emergency, you can place a card in your wallet to let them know that you have a living will and where they can find it. A living will is used if you become:  · Terminally ill.  · Disabled.  · Unable to communicate or make decisions.  Items to consider in your living will include:  · To use or not to use life-support equipment, such as dialysis machines and breathing machines (ventilators).  · A DNR or DNAR order. This tells health care providers not to use cardiopulmonary resuscitation (CPR) if breathing or heartbeat stops.  · To use or not to use tube feeding.  · To be given or not to be given food and fluids.  · Comfort (palliative) care when the goal becomes comfort rather than a cure.  · Donation of organs and tissues.  A living will does not give instructions for distributing your money and property if you should pass away.  DNR or DNAR  A DNR or DNAR order is a request not to have CPR in the event that your heart stops beating or you stop breathing. If a DNR or DNAR order has not been made and shared, a health care provider will try to help any patient whose heart has stopped or who has stopped breathing. If you plan to have surgery, talk with your health care provider about how your DNR or DNAR order will be followed if problems occur.  What if I do not have an advance directive?  If you do not have an advance directive, some states assign family decision makers to act on your behalf based on how closely you are related to them. Each state has its own laws about advance directives. You may want to check with your health care provider, , or state representative about the laws in your state.  Summary  · Advance directives are the legal documents that allow you to make choices ahead of time about your health care and medical treatment in case you become unable to tell others about your care.  · The process of discussing and writing advance directives should happen over time. You can change the  advance directives, even after you have signed them.  · Advance directives include DNR or DNAR orders, living shaw, and designating an agent as your medical power of .  This information is not intended to replace advice given to you by your health care provider. Make sure you discuss any questions you have with your health care provider.  Document Revised: 07/16/2020 Document Reviewed: 07/16/2020  Elsevier Patient Education © 2020 [employer]

## 2021-01-06 LAB
GEN CATEG CVX/VAG CYTO-IMP: NORMAL
HPV I/H RISK 4 DNA CVX QL PROBE+SIG AMP: NOT DETECTED
LAB AP CASE REPORT: NORMAL
LAB AP GYN ADDITIONAL INFORMATION: NORMAL
LAB AP GYN OTHER FINDINGS: NORMAL
PATH INTERP SPEC-IMP: NORMAL
STAT OF ADQ CVX/VAG CYTO-IMP: NORMAL

## 2021-01-08 ENCOUNTER — HOSPITAL ENCOUNTER (OUTPATIENT)
Dept: ULTRASOUND IMAGING | Facility: HOSPITAL | Age: 61
Discharge: HOME | End: 2021-01-08

## 2021-01-08 ENCOUNTER — HOSPITAL ENCOUNTER (OUTPATIENT)
Dept: MAMMOGRAPHY | Facility: HOSPITAL | Age: 61
Discharge: HOME | End: 2021-01-08

## 2021-01-08 DIAGNOSIS — N64.59 OTHER SIGNS AND SYMPTOMS IN BREAST: ICD-10-CM

## 2021-01-08 DIAGNOSIS — N63.20 LEFT BREAST LUMP: ICD-10-CM

## 2021-01-08 DIAGNOSIS — N64.89 OTHER SPECIFIED DISORDERS OF BREAST: ICD-10-CM

## 2021-01-14 ENCOUNTER — HOSPITAL ENCOUNTER (OUTPATIENT)
Dept: ULTRASOUND IMAGING | Facility: HOSPITAL | Age: 61
Discharge: HOME | End: 2021-01-14

## 2021-01-14 ENCOUNTER — HOSPITAL ENCOUNTER (OUTPATIENT)
Dept: MAMMOGRAPHY | Facility: HOSPITAL | Age: 61
Discharge: HOME | End: 2021-01-14

## 2021-01-14 DIAGNOSIS — N63.20 LEFT BREAST LUMP: ICD-10-CM

## 2021-01-14 DIAGNOSIS — N64.89 OTHER SPECIFIED DISORDERS OF BREAST: ICD-10-CM

## 2021-01-14 PROCEDURE — 76642 ULTRASOUND BREAST LIMITED: CPT

## 2021-01-14 PROCEDURE — 77066 DX MAMMO INCL CAD BI: CPT

## 2021-01-14 PROCEDURE — G0279 TOMOSYNTHESIS, MAMMO: HCPCS

## 2021-01-15 ENCOUNTER — TELEPHONE (OUTPATIENT)
Dept: INTERNAL MEDICINE | Facility: CLINIC | Age: 61
End: 2021-01-15

## 2021-01-15 NOTE — TELEPHONE ENCOUNTER
----- Message from KIRTI Sarabia MD sent at 1/14/2021  2:30 PM CST -----  Can you notify the patient of her normal mammogram?  There are 2 small benign appearing cysts in the left breast seen both on the mammogram and the ultrasound.  We will repeat annual mammogram in 1 year.  Or sooner if she has an issue.  Thanks.

## 2021-01-21 ENCOUNTER — TRANSCRIBE ORDERS (OUTPATIENT)
Dept: PHYSICAL THERAPY | Facility: CLINIC | Age: 61
End: 2021-01-21

## 2021-01-21 DIAGNOSIS — M54.50 LOW BACK PAIN, UNSPECIFIED BACK PAIN LATERALITY, UNSPECIFIED CHRONICITY, UNSPECIFIED WHETHER SCIATICA PRESENT: Primary | ICD-10-CM

## 2021-02-09 ENCOUNTER — OFFICE VISIT (OUTPATIENT)
Dept: INTERNAL MEDICINE | Facility: CLINIC | Age: 61
End: 2021-02-09

## 2021-02-09 VITALS
BODY MASS INDEX: 31.18 KG/M2 | HEIGHT: 66 IN | WEIGHT: 194 LBS | SYSTOLIC BLOOD PRESSURE: 125 MMHG | DIASTOLIC BLOOD PRESSURE: 80 MMHG | HEART RATE: 89 BPM | TEMPERATURE: 98.2 F | RESPIRATION RATE: 15 BRPM | OXYGEN SATURATION: 98 %

## 2021-02-09 DIAGNOSIS — J01.41 ACUTE RECURRENT PANSINUSITIS: ICD-10-CM

## 2021-02-09 DIAGNOSIS — Z79.4 TYPE 2 DIABETES MELLITUS WITHOUT COMPLICATION, WITH LONG-TERM CURRENT USE OF INSULIN (HCC): Primary | ICD-10-CM

## 2021-02-09 DIAGNOSIS — I10 ESSENTIAL HYPERTENSION: ICD-10-CM

## 2021-02-09 DIAGNOSIS — E11.9 TYPE 2 DIABETES MELLITUS WITHOUT COMPLICATION, WITH LONG-TERM CURRENT USE OF INSULIN (HCC): Primary | ICD-10-CM

## 2021-02-09 LAB — HBA1C MFR BLD: 8.7 %

## 2021-02-09 PROCEDURE — 99214 OFFICE O/P EST MOD 30 MIN: CPT | Performed by: INTERNAL MEDICINE

## 2021-02-09 PROCEDURE — 83036 HEMOGLOBIN GLYCOSYLATED A1C: CPT | Performed by: INTERNAL MEDICINE

## 2021-02-09 RX ORDER — AMOXICILLIN AND CLAVULANATE POTASSIUM 875; 125 MG/1; MG/1
1 TABLET, FILM COATED ORAL 2 TIMES DAILY
Qty: 14 TABLET | Refills: 0 | Status: SHIPPED | OUTPATIENT
Start: 2021-02-09 | End: 2021-02-16

## 2021-02-09 RX ORDER — GLIMEPIRIDE 2 MG/1
2 TABLET ORAL
Qty: 30 TABLET | Refills: 5 | Status: SHIPPED | OUTPATIENT
Start: 2021-02-09 | End: 2021-04-27 | Stop reason: SDUPTHER

## 2021-02-09 NOTE — PATIENT INSTRUCTIONS
-Call us if you sinusitis isn't better after the course of antibiotics so we can get a CT scan of your sinuses  -start taking amaryl     Glimepiride tablets  What is this medicine?  GLIMEPIRIDE (GLYE me ella ride) helps to treat type 2 diabetes. Treatment is combined with diet and exercise. This medicine helps your body use insulin better.  This medicine may be used for other purposes; ask your health care provider or pharmacist if you have questions.  COMMON BRAND NAME(S): Amaryl  What should I tell my health care provider before I take this medicine?  They need to know if you have any of these conditions:  · diabetic ketoacidosis  · glucose-6-phosphate dehydrogenase deficiency  · heart disease  · kidney disease  · liver disease  · severe infection or injury  · thyroid disease  · an unusual or allergic reaction to glimepiride, sulfa drugs, other medicines, foods, dyes, or preservatives  · pregnancy or recent attempts to get pregnant  · breast-feeding  How should I use this medicine?  Take this medicine by mouth. Swallow with a drink of water. Follow the directions on the prescription label. Take your dose at the same time each day, with breakfast or your first large meal. Do not take more often than directed.  Talk to your pediatrician regarding the use of this medicine in children. Special care may be needed.  Elderly patients over 65 years old can have a stronger reaction and need a smaller dose.  Overdosage: If you think you have taken too much of this medicine contact a poison control center or emergency room at once.  NOTE: This medicine is only for you. Do not share this medicine with others.  What if I miss a dose?  If you miss a dose, take it as soon as you can. If it is almost time for your next dose, take only that dose. Do not take double or extra doses.  What may interact with this medicine?  · bosentan  · chloramphenicol  · cisapride  · clarithromycin  · medicines for fungal or yeast  infections  · metoclopramide  · probenecid  · warfarin  Many medications may cause an increase or decrease in blood sugar, these include:  · alcohol containing beverages  · aspirin and aspirin-like drugs  · chloramphenicol  · chromium  · female hormones, like estrogens or progestins and birth control pills  · fluoxetine  · heart medicines like disopyramide  · isoniazid  · male hormones or anabolic steroids  · medicines called MAO Inhibitors like Nardil, Parnate, Marplan, Eldepryl  · medicines for allergies, asthma, cold, or cough  · medicines for mental problems  · medicines for weight loss  · niacin  · NSAIDs, medicines for pain and inflammation, like ibuprofen or naproxen  · pentamidine  · phenytoin  · probenecid  · quinolone antibiotics like ciprofloxacin, levofloxacin, ofloxacin  · some herbal dietary supplements  · steroid medicines like prednisone or cortisone  · thyroid medicine  · water pills or diuretics  This list may not describe all possible interactions. Give your health care provider a list of all the medicines, herbs, non-prescription drugs, or dietary supplements you use. Also tell them if you smoke, drink alcohol, or use illegal drugs. Some items may interact with your medicine.  What should I watch for while using this medicine?  Visit your doctor or health care professional for regular checks on your progress.  A test called the HbA1C (A1C) will be monitored. This is a simple blood test. It measures your blood sugar control over the last 2 to 3 months. You will receive this test every 3 to 6 months.  Learn how to check your blood sugar. Learn the symptoms of low and high blood sugar and how to manage them.  Always carry a quick-source of sugar with you in case you have symptoms of low blood sugar. Examples include hard sugar candy or glucose tablets. Make sure others know that you can choke if you eat or drink when you develop serious symptoms of low blood sugar, such as seizures or  unconsciousness. They must get medical help at once.  Tell your doctor or health care professional if you have high blood sugar. You might need to change the dose of your medicine. If you are sick or exercising more than usual, you might need to change the dose of your medicine.  Do not skip meals. Ask your doctor or health care professional if you should avoid alcohol. Many nonprescription cough and cold products contain sugar or alcohol. These can affect blood sugar.  This medicine can make you more sensitive to the sun. Keep out of the sun. If you cannot avoid being in the sun, wear protective clothing and use sunscreen. Do not use sun lamps or tanning beds/booths.  Wear a medical ID bracelet or chain, and carry a card that describes your disease and details of your medicine and dosage times.  What side effects may I notice from receiving this medicine?  Side effects that you should report to your doctor or health care professional as soon as possible:  · allergic reactions like skin rash, itching or hives, swelling of the face, lips, or tongue  · breathing problems  · dark urine  · fever, chills, sore throat  · signs and symptoms of low blood sugar such as feeling anxious, confusion, dizziness, increased hunger, unusually weak or tired, sweating, shakiness, cold, irritable, headache, blurred vision, fast heartbeat, loss of consciousness  · unusual bleeding or bruising  · yellowing of the eyes or skin  Side effects that usually do not require medical attention (report to your doctor or health care professional if they continue or are bothersome):  · diarrhea  · dizziness  · headache  · heartburn  · nausea  · stomach gas  This list may not describe all possible side effects. Call your doctor for medical advice about side effects. You may report side effects to FDA at 9-321-FDA-5933.  Where should I keep my medicine?  Keep out of the reach of children.  Store at room temperature below 30 degrees C (86 degrees F).  Throw away any unused medicine after the expiration date.  NOTE: This sheet is a summary. It may not cover all possible information. If you have questions about this medicine, talk to your doctor, pharmacist, or health care provider.  © 2020 Elsevier/Gold Standard (2014-04-02 14:29:47)

## 2021-02-09 NOTE — PROGRESS NOTES
Chief Complaint   Patient presents with   • Follow-up   • Diabetes   • Sinus Problems     3 months, ears hurt         History:  Soledad Ram is a 61 y.o. female who presents today for evaluation of the above problems.      Presents today for follow-up.    Today A1c is 8.7.  She reports sugars are  5 in the morning fasting but does not check blood sugar throughout the day.  She is already on maximum dose of Soliqua, Jardiance 25 mg daily Metformin 1000 g twice a day.  8.7    For last 3 months she has had sinus issues as well.  Symptoms include postnasal drip, sinus congestion/headache and her ears hurt.  She is also having lymphadenopathy in her neck.  Despite over-the-counter medications including daily Zyrtec (or Claritin) and Flonase symptoms have continued.  Denies any fevers chills.  She does get sore throat though from drainage.    She is on chronic pain medication and has issues with constipation despite MiraLAX 2 caps per day.    She is interested in COVID-19 vaccine        HPI    ROS:  Review of Systems   Constitutional: Positive for activity change. Negative for fatigue and fever.   HENT: Positive for congestion, ear pain, postnasal drip and sinus pressure.    Respiratory: Negative for cough and shortness of breath.    Cardiovascular: Negative for chest pain.         Current Outpatient Medications:   •  cyclobenzaprine (FLEXERIL) 10 MG tablet, Take 10 mg by mouth 2 (Two) Times a Day As Needed for Muscle Spasms., Disp: , Rfl:   •  DULoxetine (CYMBALTA) 60 MG capsule, Take 120 mg by mouth., Disp: , Rfl:   •  Empagliflozin (Jardiance) 25 MG tablet, Take 25 mg by mouth Daily., Disp: 30 tablet, Rfl: 5  •  Insulin Glargine-Lixisenatide (SOLIQUA) 100-33 UNT-MCG/ML solution pen-injector injection, Inject 20 Units under the skin into the appropriate area as directed., Disp: , Rfl:   •  losartan (COZAAR) 50 MG tablet, Take 1 tablet by mouth Daily., Disp: 30 tablet, Rfl: 5  •  metFORMIN ER (GLUCOPHAGE-XR)  500 MG 24 hr tablet, Take 1,000 mg by mouth., Disp: , Rfl:   •  oxyCODONE-acetaminophen (PERCOCET)  MG per tablet, Take 1 tablet by mouth Every 6 (Six) Hours As Needed for Moderate Pain ., Disp: , Rfl:   •  polyethylene glycol (MIRALAX) 17 g packet, Take 17 g by mouth Daily., Disp: 30 packet, Rfl: 5  •  pregabalin (Lyrica) 300 MG capsule, Take 300 mg by mouth 2 (Two) Times a Day., Disp: , Rfl:   •  rosuvastatin (CRESTOR) 20 MG tablet, Take 1 tablet by mouth Daily., Disp: 30 tablet, Rfl: 5  •  traZODone (DESYREL) 50 MG tablet, Take 150 mg by mouth., Disp: , Rfl:   •  amoxicillin-clavulanate (Augmentin) 875-125 MG per tablet, Take 1 tablet by mouth 2 (Two) Times a Day for 7 days., Disp: 14 tablet, Rfl: 0  •  glimepiride (Amaryl) 2 MG tablet, Take 1 tablet by mouth Every Morning Before Breakfast., Disp: 30 tablet, Rfl: 5    Lab Results   Component Value Date    GLUCOSE 107 (H) 11/09/2020    BUN 15 11/09/2020    CREATININE 0.71 11/09/2020    EGFRIFNONA 85 11/09/2020    BCR 21.1 11/09/2020    K 3.9 11/09/2020    CO2 24.0 11/09/2020    CALCIUM 9.9 11/09/2020    ALBUMIN 4.30 11/09/2020    AST 30 11/09/2020    ALT 26 11/09/2020       WBC   Date Value Ref Range Status   02/05/2020 9.98 3.40 - 10.80 10*3/mm3 Final     RBC   Date Value Ref Range Status   02/05/2020 4.79 3.77 - 5.28 10*6/mm3 Final     Hemoglobin   Date Value Ref Range Status   02/05/2020 14.3 12.0 - 15.9 g/dL Final     Hematocrit   Date Value Ref Range Status   02/05/2020 41.6 34.0 - 46.6 % Final     MCV   Date Value Ref Range Status   02/05/2020 86.8 79.0 - 97.0 fL Final     MCH   Date Value Ref Range Status   02/05/2020 29.9 26.6 - 33.0 pg Final     MCHC   Date Value Ref Range Status   02/05/2020 34.4 31.5 - 35.7 g/dL Final     RDW   Date Value Ref Range Status   02/05/2020 13.3 12.3 - 15.4 % Final     RDW-SD   Date Value Ref Range Status   02/05/2020 42.2 37.0 - 54.0 fl Final     MPV   Date Value Ref Range Status   02/05/2020 10.6 6.0 - 12.0 fL Final  "    Platelets   Date Value Ref Range Status   02/05/2020 244 140 - 450 10*3/mm3 Final     Neutrophil %   Date Value Ref Range Status   02/05/2020 42.9 42.7 - 76.0 % Final     Lymphocyte %   Date Value Ref Range Status   02/05/2020 36.7 19.6 - 45.3 % Final     Monocyte %   Date Value Ref Range Status   02/05/2020 10.9 5.0 - 12.0 % Final     Eosinophil %   Date Value Ref Range Status   02/05/2020 8.3 (H) 0.3 - 6.2 % Final     Basophil %   Date Value Ref Range Status   02/05/2020 0.8 0.0 - 1.5 % Final     Immature Grans %   Date Value Ref Range Status   02/05/2020 0.4 0.0 - 0.5 % Final     Neutrophils, Absolute   Date Value Ref Range Status   02/05/2020 4.28 1.70 - 7.00 10*3/mm3 Final     Lymphocytes, Absolute   Date Value Ref Range Status   02/05/2020 3.66 (H) 0.70 - 3.10 10*3/mm3 Final     Monocytes, Absolute   Date Value Ref Range Status   02/05/2020 1.09 (H) 0.10 - 0.90 10*3/mm3 Final     Eosinophils, Absolute   Date Value Ref Range Status   02/05/2020 0.83 (H) 0.00 - 0.40 10*3/mm3 Final     Basophils, Absolute   Date Value Ref Range Status   02/05/2020 0.08 0.00 - 0.20 10*3/mm3 Final     Immature Grans, Absolute   Date Value Ref Range Status   02/05/2020 0.04 0.00 - 0.05 10*3/mm3 Final     nRBC   Date Value Ref Range Status   02/05/2020 0.0 0.0 - 0.2 /100 WBC Final         OBJECTIVE:  Visit Vitals  /80 (BP Location: Left arm, Patient Position: Sitting, Cuff Size: Adult)   Pulse 89   Temp 98.2 °F (36.8 °C) (Oral)   Resp 15   Ht 167.6 cm (65.98\")   Wt 88 kg (194 lb)   SpO2 98%   BMI 31.33 kg/m²      Physical Exam  Constitutional:       General: She is not in acute distress.     Appearance: She is well-developed. She is not diaphoretic.   HENT:      Head: Normocephalic and atraumatic.      Right Ear: Tympanic membrane normal.      Left Ear: Tympanic membrane normal.      Ears:      Comments: Slight erythema of the bilateral ear canals     Nose:      Right Sinus: Maxillary sinus tenderness and frontal sinus " tenderness present.      Left Sinus: Maxillary sinus tenderness and frontal sinus tenderness present.   Eyes:      Pupils: Pupils are equal, round, and reactive to light.   Neck:      Thyroid: No thyromegaly.      Trachea: Phonation normal.   Cardiovascular:      Rate and Rhythm: Normal rate and regular rhythm.      Heart sounds: No murmur.   Pulmonary:      Effort: No respiratory distress.      Breath sounds: No wheezing or rales.   Lymphadenopathy:      Head:      Right side of head: Submandibular adenopathy present.      Left side of head: Submandibular adenopathy present.      Cervical: Cervical adenopathy present.      Upper Body:      Right upper body: No supraclavicular adenopathy.      Left upper body: No supraclavicular adenopathy.   Skin:     Coloration: Skin is not pale.      Findings: No erythema.   Neurological:      Mental Status: She is alert.   Psychiatric:         Behavior: Behavior normal.         Thought Content: Thought content normal.         Judgment: Judgment normal.         Assessment/Plan    Diagnoses and all orders for this visit:    1. Type 2 diabetes mellitus without complication, with long-term current use of insulin (CMS/[facility]) (Primary)  -     POC Glycosylated Hemoglobin (Hb A1C)  -     glimepiride (Amaryl) 2 MG tablet; Take 1 tablet by mouth Every Morning Before Breakfast.  Dispense: 30 tablet; Refill: 5    2. Essential hypertension    3. Acute recurrent pansinusitis  -     amoxicillin-clavulanate (Augmentin) 875-125 MG per tablet; Take 1 tablet by mouth 2 (Two) Times a Day for 7 days.  Dispense: 14 tablet; Refill: 0      Diabetes not well controlled with A1c 8.7.  We will continue her current regimen as she is on maximum doses of all of her medications.  I am going to add Amaryl 2 mg in the morning.  Further adjustment based on her A1c at the next visit.  Discussed diet and exercise    She has acute recurrent pansinusitis usually resolved with antibiotics.  I am sending in 7 days of  Augmentin she is to continue her antihistamine and daily Flonase.  If her symptoms are not improved or gone at the end of the 7 days we will need to get a CT of her sinuses.  This was discussed with her and she will call our office if symptoms continue.    Follow-up in 3 months with A1c or sooner if needed    Return in about 3 months (around 5/9/2021) for Recheck A1c with Amanda.    Patient was given instructions and counseling regarding his/her condition or for health maintenance advice. Please see specific information pulled into the AVS if appropriate.     AURORA Sarabia MD   09:24 CST  2/9/2021

## 2021-04-06 RX ORDER — TRAZODONE HYDROCHLORIDE 50 MG/1
150 TABLET ORAL NIGHTLY
Qty: 90 TABLET | Refills: 5 | Status: SHIPPED | OUTPATIENT
Start: 2021-04-06 | End: 2021-12-27

## 2021-04-14 ENCOUNTER — TELEPHONE (OUTPATIENT)
Dept: INTERNAL MEDICINE | Facility: CLINIC | Age: 61
End: 2021-04-14

## 2021-04-14 NOTE — TELEPHONE ENCOUNTER
PATIENT HAS CALLED TO UPDATE PCP THAT HER SCRIPT AMOXICILLIN HAD HELPED, BUT SHE HAS NOW ANOTHER SINUS INFECTION AND IS WONDERING WHAT HER NEXT STEP SHOULD BE.    PLEASE CALL AND ADVISE: [phone]

## 2021-04-14 NOTE — TELEPHONE ENCOUNTER
PATIENT HAS BEEN CALLED, SHE STATED THAT SHE HAS GOTTEN BOTH COVID VACCINES AND STATED THAT THIS HAS BEEN GOING ON BEFORE THE INJECTIONS.

## 2021-04-14 NOTE — TELEPHONE ENCOUNTER
PATIENT HAS BEEN CALLED, SHE STATED THAT SHE HAS A SORE THROAT FROM WHERE HER SINUS ARE DRAINING, STOPPED UP HEAD AND YELLOW NASAL DRAINAGE.  SHE STATED SHE GETS LIGHT HEADED AND DIZZY WHEN SHE STANDS UP TO FAST.    SHE STATED THE AMOXICILLIN DID HELP.

## 2021-04-15 ENCOUNTER — TELEMEDICINE (OUTPATIENT)
Dept: INTERNAL MEDICINE | Facility: CLINIC | Age: 61
End: 2021-04-15

## 2021-04-15 DIAGNOSIS — J01.90 SUBACUTE SINUSITIS, UNSPECIFIED LOCATION: Primary | ICD-10-CM

## 2021-04-15 PROCEDURE — 99441 PR PHYS/QHP TELEPHONE EVALUATION 5-10 MIN: CPT | Performed by: NURSE PRACTITIONER

## 2021-04-15 RX ORDER — AMOXICILLIN AND CLAVULANATE POTASSIUM 875; 125 MG/1; MG/1
1 TABLET, FILM COATED ORAL 2 TIMES DAILY
Qty: 20 TABLET | Refills: 0 | Status: SHIPPED | OUTPATIENT
Start: 2021-04-15 | End: 2021-05-10

## 2021-04-15 NOTE — PROGRESS NOTES
"Telephone Visit    You have chosen to receive care through a telephone visit today. Do you consent to use a telephone visit for your medical care today? Yes  We were unable to connect via video as scheduled, so this was done as a telephone visit.    Chief Complaint   Patient presents with   • Cough     c/o   • Nasal Congestion     c/o   • Nausea     from time to time         History:  Soledad Ram is a 61 y.o. female who presents today for telephone visit.         Cough and congestion, sore throat, drainage down throat.   Sx for 2 months.  On Amoxicillin in February, has tried Claritin, Zyrtec, Flonase, not helping. Blows nose and it is thick and red/green. No fever, but \"my whole face is sore and gums, when I bend over it feels like my whole head feels like it's going to blow off.\"       ROS:  Review of Systems   Constitutional: Positive for fatigue. Negative for activity change and fever.   HENT: Positive for congestion, postnasal drip (thick), sinus pressure, sinus pain (under eyes, on forehead, in gums) and sore throat.    Respiratory: Negative for cough, chest tightness, shortness of breath and wheezing.    Cardiovascular: Negative for chest pain.   Gastrointestinal: Positive for nausea. Negative for diarrhea and vomiting.   Genitourinary: Negative for dysuria.   Musculoskeletal: Negative for myalgias.   Allergic/Immunologic: Positive for environmental allergies. Negative for food allergies.   Hematological: Positive for adenopathy (in throat).       Allergies   Allergen Reactions   • Lisinopril Swelling     FACE SWELLS   • Erythromycin Rash         Current Outpatient Medications:   •  cyclobenzaprine (FLEXERIL) 10 MG tablet, Take 10 mg by mouth 2 (Two) Times a Day As Needed for Muscle Spasms., Disp: , Rfl:   •  DULoxetine (CYMBALTA) 60 MG capsule, Take 120 mg by mouth., Disp: , Rfl:   •  Empagliflozin (Jardiance) 25 MG tablet, Take 25 mg by mouth Daily., Disp: 30 tablet, Rfl: 5  •  glimepiride (Amaryl) 2 " MG tablet, Take 1 tablet by mouth Every Morning Before Breakfast., Disp: 30 tablet, Rfl: 5  •  Insulin Glargine-Lixisenatide (SOLIQUA) 100-33 UNT-MCG/ML solution pen-injector injection, Inject 20 Units under the skin into the appropriate area as directed., Disp: , Rfl:   •  losartan (COZAAR) 50 MG tablet, Take 1 tablet by mouth Daily., Disp: 30 tablet, Rfl: 5  •  metFORMIN ER (GLUCOPHAGE-XR) 500 MG 24 hr tablet, Take 1,000 mg by mouth., Disp: , Rfl:   •  oxyCODONE-acetaminophen (PERCOCET)  MG per tablet, Take 1 tablet by mouth Every 6 (Six) Hours As Needed for Moderate Pain ., Disp: , Rfl:   •  polyethylene glycol (MIRALAX) 17 g packet, Take 17 g by mouth Daily., Disp: 30 packet, Rfl: 5  •  pregabalin (Lyrica) 300 MG capsule, Take 300 mg by mouth 2 (Two) Times a Day., Disp: , Rfl:   •  rosuvastatin (CRESTOR) 20 MG tablet, Take 1 tablet by mouth Daily., Disp: 30 tablet, Rfl: 5  •  traZODone (DESYREL) 50 MG tablet, Take 3 tablets by mouth Every Night., Disp: 90 tablet, Rfl: 5  •  amoxicillin-clavulanate (Augmentin) 875-125 MG per tablet, Take 1 tablet by mouth 2 (Two) Times a Day., Disp: 20 tablet, Rfl: 0  •  guaiFENesin (Mucinex) 600 MG 12 hr tablet, Take 2 tablets by mouth 2 (Two) Times a Day. Take with full glass of water., Disp: 40 tablet, Rfl: 0    OBJECTIVE:  There were no vitals taken for this visit.   Physical Exam  Neurological:      Mental Status: She is oriented to person, place, and time.         Assessment/Plan    Diagnoses and all orders for this visit:    1. Subacute sinusitis, unspecified location (Primary)  -     amoxicillin-clavulanate (Augmentin) 875-125 MG per tablet; Take 1 tablet by mouth 2 (Two) Times a Day.  Dispense: 20 tablet; Refill: 0  -     guaiFENesin (Mucinex) 600 MG 12 hr tablet; Take 2 tablets by mouth 2 (Two) Times a Day. Take with full glass of water.  Dispense: 40 tablet; Refill: 0    Continue Flonase.  Increase fluids.  Please let us know if sx worsen or do not improve with  treatment.    Education materials and an After Visit Summary were provided to patient via [facility]    Return for Next scheduled follow up.     This visit has been scheduled as a phone visit to comply with patient safety concerns in accordance with CDC recommendations. Total time of discussion was 10 minutes.    Gracia Tran, MARTY  11:58 CDT  4/26/2021

## 2021-04-15 NOTE — PATIENT INSTRUCTIONS
Sinusitis, Adult  Sinusitis is inflammation of your sinuses. Sinuses are hollow spaces in the bones around your face. Your sinuses are located:  · Around your eyes.  · In the middle of your forehead.  · Behind your nose.  · In your cheekbones.  Mucus normally drains out of your sinuses. When your nasal tissues become inflamed or swollen, mucus can become trapped or blocked. This allows bacteria, viruses, and fungi to grow, which leads to infection. Most infections of the sinuses are caused by a virus.  Sinusitis can develop quickly. It can last for up to 4 weeks (acute) or for more than 12 weeks (chronic). Sinusitis often develops after a cold.  What are the causes?  This condition is caused by anything that creates swelling in the sinuses or stops mucus from draining. This includes:  · Allergies.  · Asthma.  · Infection from bacteria or viruses.  · Deformities or blockages in your nose or sinuses.  · Abnormal growths in the nose (nasal polyps).  · Pollutants, such as chemicals or irritants in the air.  · Infection from fungi (rare).  What increases the risk?  You are more likely to develop this condition if you:  · Have a weak body defense system (immune system).  · Do a lot of swimming or diving.  · Overuse nasal sprays.  · Smoke.  What are the signs or symptoms?  The main symptoms of this condition are pain and a feeling of pressure around the affected sinuses. Other symptoms include:  · Stuffy nose or congestion.  · Thick drainage from your nose.  · Swelling and warmth over the affected sinuses.  · Headache.  · Upper toothache.  · A cough that may get worse at night.  · Extra mucus that collects in the throat or the back of the nose (postnasal drip).  · Decreased sense of smell and taste.  · Fatigue.  · A fever.  · Sore throat.  · Bad breath.  How is this diagnosed?  This condition is diagnosed based on:  · Your symptoms.  · Your medical history.  · A physical exam.  · Tests to find out if your condition is  acute or chronic. This may include:  ? Checking your nose for nasal polyps.  ? Viewing your sinuses using a device that has a light (endoscope).  ? Testing for allergies or bacteria.  ? Imaging tests, such as an MRI or CT scan.  In rare cases, a bone biopsy may be done to rule out more serious types of fungal sinus disease.  How is this treated?  Treatment for sinusitis depends on the cause and whether your condition is chronic or acute.  · If caused by a virus, your symptoms should go away on their own within 10 days. You may be given medicines to relieve symptoms. They include:  ? Medicines that shrink swollen nasal passages (topical intranasal decongestants).  ? Medicines that treat allergies (antihistamines).  ? A spray that eases inflammation of the nostrils (topical intranasal corticosteroids).  ? Rinses that help get rid of thick mucus in your nose (nasal saline washes).  · If caused by bacteria, your health care provider may recommend waiting to see if your symptoms improve. Most bacterial infections will get better without antibiotic medicine. You may be given antibiotics if you have:  ? A severe infection.  ? A weak immune system.  · If caused by narrow nasal passages or nasal polyps, you may need to have surgery.  Follow these instructions at home:  Medicines  · Take, use, or apply over-the-counter and prescription medicines only as told by your health care provider. These may include nasal sprays.  · If you were prescribed an antibiotic medicine, take it as told by your health care provider. Do not stop taking the antibiotic even if you start to feel better.  Hydrate and humidify    · Drink enough fluid to keep your urine pale yellow. Staying hydrated will help to thin your mucus.  · Use a cool mist humidifier to keep the humidity level in your home above 50%.  · Inhale steam for 10-15 minutes, 3-4 times a day, or as told by your health care provider. You can do this in the bathroom while a hot shower is  running.  · Limit your exposure to cool or dry air.  Rest  · Rest as much as possible.  · Sleep with your head raised (elevated).  · Make sure you get enough sleep each night.  General instructions    · Apply a warm, moist washcloth to your face 3-4 times a day or as told by your health care provider. This will help with discomfort.  · Wash your hands often with soap and water to reduce your exposure to germs. If soap and water are not available, use hand .  · Do not smoke. Avoid being around people who are smoking (secondhand smoke).  · Keep all follow-up visits as told by your health care provider. This is important.  Contact a health care provider if:  · You have a fever.  · Your symptoms get worse.  · Your symptoms do not improve within 10 days.  Get help right away if:  · You have a severe headache.  · You have persistent vomiting.  · You have severe pain or swelling around your face or eyes.  · You have vision problems.  · You develop confusion.  · Your neck is stiff.  · You have trouble breathing.  Summary  · Sinusitis is soreness and inflammation of your sinuses. Sinuses are hollow spaces in the bones around your face.  · This condition is caused by nasal tissues that become inflamed or swollen. The swelling traps or blocks the flow of mucus. This allows bacteria, viruses, and fungi to grow, which leads to infection.  · If you were prescribed an antibiotic medicine, take it as told by your health care provider. Do not stop taking the antibiotic even if you start to feel better.  · Keep all follow-up visits as told by your health care provider. This is important.  This information is not intended to replace advice given to you by your health care provider. Make sure you discuss any questions you have with your health care provider.  Document Revised: 05/20/2019 Document Reviewed: 05/20/2019  [employer] Patient Education © 2021 [employer]    Sinusitis, Adult  Sinusitis is inflammation of your  sinuses. Sinuses are hollow spaces in the bones around your face. Your sinuses are located:  · Around your eyes.  · In the middle of your forehead.  · Behind your nose.  · In your cheekbones.  Mucus normally drains out of your sinuses. When your nasal tissues become inflamed or swollen, mucus can become trapped or blocked. This allows bacteria, viruses, and fungi to grow, which leads to infection. Most infections of the sinuses are caused by a virus.  Sinusitis can develop quickly. It can last for up to 4 weeks (acute) or for more than 12 weeks (chronic). Sinusitis often develops after a cold.  What are the causes?  This condition is caused by anything that creates swelling in the sinuses or stops mucus from draining. This includes:  · Allergies.  · Asthma.  · Infection from bacteria or viruses.  · Deformities or blockages in your nose or sinuses.  · Abnormal growths in the nose (nasal polyps).  · Pollutants, such as chemicals or irritants in the air.  · Infection from fungi (rare).  What increases the risk?  You are more likely to develop this condition if you:  · Have a weak body defense system (immune system).  · Do a lot of swimming or diving.  · Overuse nasal sprays.  · Smoke.  What are the signs or symptoms?  The main symptoms of this condition are pain and a feeling of pressure around the affected sinuses. Other symptoms include:  · Stuffy nose or congestion.  · Thick drainage from your nose.  · Swelling and warmth over the affected sinuses.  · Headache.  · Upper toothache.  · A cough that may get worse at night.  · Extra mucus that collects in the throat or the back of the nose (postnasal drip).  · Decreased sense of smell and taste.  · Fatigue.  · A fever.  · Sore throat.  · Bad breath.  How is this diagnosed?  This condition is diagnosed based on:  · Your symptoms.  · Your medical history.  · A physical exam.  · Tests to find out if your condition is acute or chronic. This may include:  ? Checking your  nose for nasal polyps.  ? Viewing your sinuses using a device that has a light (endoscope).  ? Testing for allergies or bacteria.  ? Imaging tests, such as an MRI or CT scan.  In rare cases, a bone biopsy may be done to rule out more serious types of fungal sinus disease.  How is this treated?  Treatment for sinusitis depends on the cause and whether your condition is chronic or acute.  · If caused by a virus, your symptoms should go away on their own within 10 days. You may be given medicines to relieve symptoms. They include:  ? Medicines that shrink swollen nasal passages (topical intranasal decongestants).  ? Medicines that treat allergies (antihistamines).  ? A spray that eases inflammation of the nostrils (topical intranasal corticosteroids).  ? Rinses that help get rid of thick mucus in your nose (nasal saline washes).  · If caused by bacteria, your health care provider may recommend waiting to see if your symptoms improve. Most bacterial infections will get better without antibiotic medicine. You may be given antibiotics if you have:  ? A severe infection.  ? A weak immune system.  · If caused by narrow nasal passages or nasal polyps, you may need to have surgery.  Follow these instructions at home:  Medicines  · Take, use, or apply over-the-counter and prescription medicines only as told by your health care provider. These may include nasal sprays.  · If you were prescribed an antibiotic medicine, take it as told by your health care provider. Do not stop taking the antibiotic even if you start to feel better.  Hydrate and humidify    · Drink enough fluid to keep your urine pale yellow. Staying hydrated will help to thin your mucus.  · Use a cool mist humidifier to keep the humidity level in your home above 50%.  · Inhale steam for 10-15 minutes, 3-4 times a day, or as told by your health care provider. You can do this in the bathroom while a hot shower is running.  · Limit your exposure to cool or dry  air.  Rest  · Rest as much as possible.  · Sleep with your head raised (elevated).  · Make sure you get enough sleep each night.  General instructions    · Apply a warm, moist washcloth to your face 3-4 times a day or as told by your health care provider. This will help with discomfort.  · Wash your hands often with soap and water to reduce your exposure to germs. If soap and water are not available, use hand .  · Do not smoke. Avoid being around people who are smoking (secondhand smoke).  · Keep all follow-up visits as told by your health care provider. This is important.  Contact a health care provider if:  · You have a fever.  · Your symptoms get worse.  · Your symptoms do not improve within 10 days.  Get help right away if:  · You have a severe headache.  · You have persistent vomiting.  · You have severe pain or swelling around your face or eyes.  · You have vision problems.  · You develop confusion.  · Your neck is stiff.  · You have trouble breathing.  Summary  · Sinusitis is soreness and inflammation of your sinuses. Sinuses are hollow spaces in the bones around your face.  · This condition is caused by nasal tissues that become inflamed or swollen. The swelling traps or blocks the flow of mucus. This allows bacteria, viruses, and fungi to grow, which leads to infection.  · If you were prescribed an antibiotic medicine, take it as told by your health care provider. Do not stop taking the antibiotic even if you start to feel better.  · Keep all follow-up visits as told by your health care provider. This is important.  This information is not intended to replace advice given to you by your health care provider. Make sure you discuss any questions you have with your health care provider.  Document Revised: 05/20/2019 Document Reviewed: 05/20/2019  [employer] Patient Education © 2021 [employer]

## 2021-04-26 ENCOUNTER — TELEPHONE (OUTPATIENT)
Dept: INTERNAL MEDICINE | Facility: CLINIC | Age: 61
End: 2021-04-26

## 2021-04-26 NOTE — TELEPHONE ENCOUNTER
Pt called and stated she went to her pharmacy yesterday to  her Jardiance and it was $500. She is requesting a different medication. Please advise.

## 2021-04-26 NOTE — TELEPHONE ENCOUNTER
"Called pharmacy they said that she is in the \" doughnut hole\" and that they must be wanting her to try something different. They did say the pt picked up a 30 say supply but it was still $70-something.   "

## 2021-04-27 DIAGNOSIS — Z79.4 TYPE 2 DIABETES MELLITUS WITHOUT COMPLICATION, WITH LONG-TERM CURRENT USE OF INSULIN (HCC): ICD-10-CM

## 2021-04-27 DIAGNOSIS — E11.9 TYPE 2 DIABETES MELLITUS WITHOUT COMPLICATION, WITH LONG-TERM CURRENT USE OF INSULIN (HCC): ICD-10-CM

## 2021-04-27 RX ORDER — GLIMEPIRIDE 4 MG/1
4 TABLET ORAL
Qty: 30 TABLET | Refills: 5 | Status: SHIPPED | OUTPATIENT
Start: 2021-04-27 | End: 2021-06-09 | Stop reason: SDUPTHER

## 2021-05-04 ENCOUNTER — HOSPITAL ENCOUNTER (OUTPATIENT)
Dept: GENERAL RADIOLOGY | Facility: HOSPITAL | Age: 61
Discharge: HOME | End: 2021-05-04
Admitting: NURSE PRACTITIONER

## 2021-05-04 ENCOUNTER — TRANSCRIBE ORDERS (OUTPATIENT)
Dept: ADMINISTRATIVE | Facility: HOSPITAL | Age: 61
End: 2021-05-04

## 2021-05-04 DIAGNOSIS — M25.562 LEFT KNEE PAIN, UNSPECIFIED CHRONICITY: Primary | ICD-10-CM

## 2021-05-04 DIAGNOSIS — M25.562 LEFT KNEE PAIN, UNSPECIFIED CHRONICITY: ICD-10-CM

## 2021-05-04 PROCEDURE — 73562 X-RAY EXAM OF KNEE 3: CPT

## 2021-05-10 ENCOUNTER — OFFICE VISIT (OUTPATIENT)
Dept: INTERNAL MEDICINE | Facility: CLINIC | Age: 61
End: 2021-05-10

## 2021-05-10 VITALS
BODY MASS INDEX: 29.88 KG/M2 | HEIGHT: 67 IN | OXYGEN SATURATION: 96 % | WEIGHT: 190.4 LBS | DIASTOLIC BLOOD PRESSURE: 76 MMHG | HEART RATE: 84 BPM | SYSTOLIC BLOOD PRESSURE: 128 MMHG

## 2021-05-10 DIAGNOSIS — E78.5 HYPERLIPIDEMIA, UNSPECIFIED HYPERLIPIDEMIA TYPE: ICD-10-CM

## 2021-05-10 DIAGNOSIS — Z79.4 TYPE 2 DIABETES MELLITUS WITHOUT COMPLICATION, WITH LONG-TERM CURRENT USE OF INSULIN (HCC): Primary | ICD-10-CM

## 2021-05-10 DIAGNOSIS — E11.9 TYPE 2 DIABETES MELLITUS WITHOUT COMPLICATION, WITH LONG-TERM CURRENT USE OF INSULIN (HCC): Primary | ICD-10-CM

## 2021-05-10 DIAGNOSIS — I10 ESSENTIAL HYPERTENSION: ICD-10-CM

## 2021-05-10 LAB — HBA1C MFR BLD: 8.2 %

## 2021-05-10 PROCEDURE — 3052F HG A1C>EQUAL 8.0%<EQUAL 9.0%: CPT | Performed by: NURSE PRACTITIONER

## 2021-05-10 PROCEDURE — 99214 OFFICE O/P EST MOD 30 MIN: CPT | Performed by: NURSE PRACTITIONER

## 2021-05-10 PROCEDURE — 83036 HEMOGLOBIN GLYCOSYLATED A1C: CPT | Performed by: NURSE PRACTITIONER

## 2021-05-10 RX ORDER — GABAPENTIN 600 MG/1
600 TABLET ORAL EVERY 8 HOURS PRN
COMMUNITY
Start: 2021-05-04

## 2021-05-10 NOTE — PROGRESS NOTES
Chief Complaint   Patient presents with   • Follow-up   • Diabetes         History:  Soledad Ram is a 61 y.o. female who presents today for evaluation of the above problems.          Patient is here today for follow-up on diabetes and hypertension.  At last visit, A1C was 8.7, and Amaryl 2 mg daily was added to her regimen. She has recently had another change in her diabetic medication.  When she went to fill her Jardiance in April, it was over $400.  On 4/27/2021 we gave her a prescription for Amaryl 4 mg daily, and she has been taking this.  She is not taking Jardiance anymore.  She is also maintained on Glucophage 1000 mg twice daily and Soliqua 20 units twice daily.    Since starting the Amaryl, she says her fasting blood sugars have been around 120.  She does not check her blood sugar at any other times during the day.  She has had no episodes where she thought she was hypoglycemic.  She has been trying to walk on the treadmill at the gym and also doing some weightlifting to strengthen her back and her core.  She is trying to avoid sugar and is conscious of carbs.      ROS:  Review of Systems   Constitutional: Negative for activity change, fatigue and fever.   Respiratory: Negative for cough and shortness of breath.    Cardiovascular: Negative for chest pain and leg swelling.       Allergies   Allergen Reactions   • Lisinopril Swelling     FACE SWELLS   • Erythromycin Rash     Past Medical History:   Diagnosis Date   • Diabetes mellitus (CMS/HCC)    • Infection      Past Surgical History:   Procedure Laterality Date   • CARPAL TUNNEL RELEASE     • TONSILLECTOMY     • TUBAL ABDOMINAL LIGATION       Family History   Problem Relation Age of Onset   • Cancer Mother    • Cancer Father    • Cancer Sister    • Heart disease Paternal Grandfather    • Breast cancer Neg Hx      Soledad  reports that she has never smoked. She has never used smokeless tobacco. She reports that she does not drink alcohol and does not use  "drugs.    I have reviewed and updated the above documentation (if necessary) including but not limited to chief complaint, ROS, PFSH, allergies and medications        Current Outpatient Medications:   •  cyclobenzaprine (FLEXERIL) 10 MG tablet, Take 10 mg by mouth 2 (Two) Times a Day As Needed for Muscle Spasms., Disp: , Rfl:   •  DULoxetine (CYMBALTA) 60 MG capsule, Take 120 mg by mouth., Disp: , Rfl:   •  gabapentin (NEURONTIN) 600 MG tablet, Take 600 mg by mouth Every 8 (Eight) Hours As Needed., Disp: , Rfl:   •  glimepiride (Amaryl) 4 MG tablet, Take 1 tablet by mouth Every Morning Before Breakfast., Disp: 30 tablet, Rfl: 5  •  guaiFENesin (Mucinex) 600 MG 12 hr tablet, Take 2 tablets by mouth 2 (Two) Times a Day. Take with full glass of water., Disp: 40 tablet, Rfl: 0  •  Insulin Glargine-Lixisenatide (SOLIQUA) 100-33 UNT-MCG/ML solution pen-injector injection, Inject 20 Units under the skin into the appropriate area as directed 2 (two) times a day., Disp: , Rfl:   •  losartan (COZAAR) 50 MG tablet, Take 1 tablet by mouth Daily., Disp: 30 tablet, Rfl: 5  •  metFORMIN ER (GLUCOPHAGE-XR) 500 MG 24 hr tablet, Take 1,000 mg by mouth 2 (two) times a day., Disp: , Rfl:   •  oxyCODONE-acetaminophen (PERCOCET)  MG per tablet, Take 1 tablet by mouth Every 6 (Six) Hours As Needed for Moderate Pain ., Disp: , Rfl:   •  polyethylene glycol (MIRALAX) 17 g packet, Take 17 g by mouth Daily., Disp: 30 packet, Rfl: 5  •  rosuvastatin (CRESTOR) 20 MG tablet, Take 1 tablet by mouth Daily., Disp: 30 tablet, Rfl: 5  •  traZODone (DESYREL) 50 MG tablet, Take 3 tablets by mouth Every Night., Disp: 90 tablet, Rfl: 5          OBJECTIVE:  Visit Vitals  /76 (BP Location: Right arm, Patient Position: Sitting, Cuff Size: Adult)   Pulse 84   Ht 170.2 cm (67\")   Wt 86.4 kg (190 lb 6.4 oz)   SpO2 96%   BMI 29.82 kg/m²      Physical Exam  Vitals and nursing note reviewed.   Constitutional:       General: She is not in acute " distress.     Appearance: Normal appearance. She is not ill-appearing, toxic-appearing or diaphoretic.   HENT:      Head: Normocephalic and atraumatic.   Eyes:      General: No scleral icterus.        Right eye: No discharge.         Left eye: No discharge.      Conjunctiva/sclera: Conjunctivae normal.   Neck:      Vascular: No carotid bruit.   Cardiovascular:      Rate and Rhythm: Normal rate and regular rhythm.      Heart sounds: Normal heart sounds. No murmur heard.   No friction rub. No gallop.    Pulmonary:      Effort: Pulmonary effort is normal. No respiratory distress.      Breath sounds: Normal breath sounds. No stridor. No wheezing, rhonchi or rales.   Abdominal:      General: There is no distension.      Palpations: Abdomen is soft.      Tenderness: There is no abdominal tenderness.   Musculoskeletal:         General: No swelling, tenderness, deformity or signs of injury. Normal range of motion.      Cervical back: Normal range of motion and neck supple. No rigidity or tenderness.      Right lower leg: No edema.      Left lower leg: No edema.   Lymphadenopathy:      Cervical: No cervical adenopathy.   Skin:     General: Skin is warm and dry.      Coloration: Skin is not jaundiced or pale.      Findings: No bruising, erythema, lesion or rash.   Neurological:      General: No focal deficit present.      Mental Status: She is alert and oriented to person, place, and time.      Gait: Gait normal.   Psychiatric:         Mood and Affect: Mood normal.         Behavior: Behavior normal.         Thought Content: Thought content normal.         Judgment: Judgment normal.     A1C is 8.2 today, down from 8.7 three months ago.  I reviewed CMP, lipids, and microalbumin done 11/9/20.    [facility]    Assessment/Plan    Diagnoses and all orders for this visit:    1. Type 2 diabetes mellitus without complication, with long-term current use of insulin (CMS/[facility]) (Primary)  -     POC Glycosylated Hemoglobin (Hb A1C)    2. Essential  hypertension    3. Hyperlipidemia, unspecified hyperlipidemia type    Given the recent change in medication, and the report of fasting sugars around 120, I am not going to make any changes in diabetic medication today.  We did discuss the importance of exercise and weight loss in managing her diabetes.  She is already trying to work on a low sugar low-carb diet.  I asked her to continue to monitor fasting blood glucose levels and also check some in the late afternoon, around 4 PM.  If she is running over 200 on those we would definitely like to know.  We did discuss the possibility of referring her back to the dietitian but she defers this today as she has already done this in the past.    Her blood pressure looks great today.  We will continue on losartan 50 mg daily.      Education materials and an After Visit Summary were printed and given to the patient at discharge.  Return in about 3 months (around 8/10/2021) for follow up with Dr. Sarabia. Will plan on re-checking A1C at that time.          MARTY Parsons   09:10 CDT  5/10/2021

## 2021-05-10 NOTE — PATIENT INSTRUCTIONS
Diabetes Mellitus and Nutrition, Adult  When you have diabetes, or diabetes mellitus, it is very important to have healthy eating habits because your blood sugar (glucose) levels are greatly affected by what you eat and drink. Eating healthy foods in the right amounts, at about the same times every day, can help you:  · Control your blood glucose.  · Lower your risk of heart disease.  · Improve your blood pressure.  · Reach or maintain a healthy weight.  What can affect my meal plan?  Every person with diabetes is different, and each person has different needs for a meal plan. Your health care provider may recommend that you work with a dietitian to make a meal plan that is best for you. Your meal plan may vary depending on factors such as:  · The calories you need.  · The medicines you take.  · Your weight.  · Your blood glucose, blood pressure, and cholesterol levels.  · Your activity level.  · Other health conditions you have, such as heart or kidney disease.  How do carbohydrates affect me?  Carbohydrates, also called carbs, affect your blood glucose level more than any other type of food. Eating carbs naturally raises the amount of glucose in your blood. Carb counting is a method for keeping track of how many carbs you eat. Counting carbs is important to keep your blood glucose at a healthy level, especially if you use insulin or take certain oral diabetes medicines.  It is important to know how many carbs you can safely have in each meal. This is different for every person. Your dietitian can help you calculate how many carbs you should have at each meal and for each snack.  How does alcohol affect me?  Alcohol can cause a sudden decrease in blood glucose (hypoglycemia), especially if you use insulin or take certain oral diabetes medicines. Hypoglycemia can be a life-threatening condition. Symptoms of hypoglycemia, such as sleepiness, dizziness, and confusion, are similar to symptoms of having too much  "alcohol.  · Do not drink alcohol if:  ? Your health care provider tells you not to drink.  ? You are pregnant, may be pregnant, or are planning to become pregnant.  · If you drink alcohol:  ? Do not drink on an empty stomach.  ? Limit how much you use to:  § 0-1 drink a day for women.  § 0-2 drinks a day for men.  ? Be aware of how much alcohol is in your drink. In the [place] one drink equals one 12 oz bottle of beer (355 mL), one 5 oz glass of wine (148 mL), or one 1½ oz glass of hard liquor (44 mL).  ? Keep yourself hydrated with water, diet soda, or unsweetened iced tea.  § Keep in mind that regular soda, juice, and other mixers may contain a lot of sugar and must be counted as carbs.  What are tips for following this plan?    Reading food labels  · Start by checking the serving size on the \"Nutrition Facts\" label of packaged foods and drinks. The amount of calories, carbs, fats, and other nutrients listed on the label is based on one serving of the item. Many items contain more than one serving per package.  · Check the total grams (g) of carbs in one serving. You can calculate the number of servings of carbs in one serving by dividing the total carbs by 15. For example, if a food has 30 g of total carbs per serving, it would be equal to 2 servings of carbs.  · Check the number of grams (g) of saturated fats and trans fats in one serving. Choose foods that have a low amount or none of these fats.  · Check the number of milligrams (mg) of salt (sodium) in one serving. Most people should limit total sodium intake to less than 2,300 mg per day.  · Always check the nutrition information of foods labeled as \"low-fat\" or \"nonfat.\" These foods may be higher in added sugar or refined carbs and should be avoided.  · Talk to your dietitian to identify your daily goals for nutrients listed on the label.  Shopping  · Avoid buying canned, pre-made, or processed foods. These foods tend to be high in fat, sodium, and added " sugar.  · Shop around the outside edge of the grocery store. This is where you will most often find fresh fruits and vegetables, bulk grains, fresh meats, and fresh dairy.  Cooking  · Use low-heat cooking methods, such as baking, instead of high-heat cooking methods like deep frying.  · Cook using healthy oils, such as olive, canola, or sunflower oil.  · Avoid cooking with butter, cream, or high-fat meats.  Meal planning  · Eat meals and snacks regularly, preferably at the same times every day. Avoid going long periods of time without eating.  · Eat foods that are high in fiber, such as fresh fruits, vegetables, beans, and whole grains. Talk with your dietitian about how many servings of carbs you can eat at each meal.  · Eat 4-6 oz (112-168 g) of lean protein each day, such as lean meat, chicken, fish, eggs, or tofu. One ounce (oz) of lean protein is equal to:  ? 1 oz (28 g) of meat, chicken, or fish.  ? 1 egg.  ? ¼ cup (62 g) of tofu.  · Eat some foods each day that contain healthy fats, such as avocado, nuts, seeds, and fish.  What foods should I eat?  Fruits  Berries. Apples. Oranges. Peaches. Apricots. Plums. Grapes. Lb. Papaya. Pomegranate. Kiwi. Cherries.  Vegetables  Lettuce. Spinach. Leafy greens, including kale, chard, lukas greens, and mustard greens. Beets. Cauliflower. Cabbage. Broccoli. Carrots. Green beans. Tomatoes. Peppers. Onions. Cucumbers. Dixon sprouts.  Grains  Whole grains, such as whole-wheat or whole-grain bread, crackers, tortillas, cereal, and pasta. Unsweetened oatmeal. Quinoa. Brown or wild rice.  Meats and other proteins  Seafood. Poultry without skin. Lean cuts of poultry and beef. Tofu. Nuts. Seeds.  Dairy  Low-fat or fat-free dairy products such as milk, yogurt, and cheese.  The items listed above may not be a complete list of foods and beverages you can eat. Contact a dietitian for more information.  What foods should I avoid?  Fruits  Fruits canned with  syrup.  Vegetables  Canned vegetables. Frozen vegetables with butter or cream sauce.  Grains  Refined white flour and flour products such as bread, pasta, snack foods, and cereals. Avoid all processed foods.  Meats and other proteins  Fatty cuts of meat. Poultry with skin. Breaded or fried meats. Processed meat. Avoid saturated fats.  Dairy  Full-fat yogurt, cheese, or milk.  Beverages  Sweetened drinks, such as soda or iced tea.  The items listed above may not be a complete list of foods and beverages you should avoid. Contact a dietitian for more information.  Questions to ask a health care provider  · Do I need to meet with a diabetes educator?  · Do I need to meet with a dietitian?  · What number can I call if I have questions?  · When are the best times to check my blood glucose?  Where to find more information:  · American Diabetes Association: diabetes.org  · Academy of Nutrition and Dietetics: www.eatright.org  · National Scotia of Diabetes and Digestive and Kidney Diseases: www.niddk.nih.gov  · Association of Diabetes Care and Education Specialists: www.diabeteseducator.org  Summary  · It is important to have healthy eating habits because your blood sugar (glucose) levels are greatly affected by what you eat and drink.  · A healthy meal plan will help you control your blood glucose and maintain a healthy lifestyle.  · Your health care provider may recommend that you work with a dietitian to make a meal plan that is best for you.  · Keep in mind that carbohydrates (carbs) and alcohol have immediate effects on your blood glucose levels. It is important to count carbs and to use alcohol carefully.  This information is not intended to replace advice given to you by your health care provider. Make sure you discuss any questions you have with your health care provider.  Document Revised: 11/24/2020 Document Reviewed: 11/24/2020  Elsevier Patient Education © 2021 [employer].

## 2021-06-06 DIAGNOSIS — I10 ESSENTIAL HYPERTENSION: ICD-10-CM

## 2021-06-07 RX ORDER — LOSARTAN POTASSIUM 50 MG/1
TABLET ORAL
Qty: 90 TABLET | Refills: 3 | Status: SHIPPED | OUTPATIENT
Start: 2021-06-07 | End: 2022-06-27 | Stop reason: SDUPTHER

## 2021-06-07 RX ORDER — ROSUVASTATIN CALCIUM 20 MG/1
TABLET, COATED ORAL
Qty: 90 TABLET | Refills: 3 | Status: SHIPPED | OUTPATIENT
Start: 2021-06-07 | End: 2022-06-27 | Stop reason: SDUPTHER

## 2021-06-09 DIAGNOSIS — Z79.4 TYPE 2 DIABETES MELLITUS WITHOUT COMPLICATION, WITH LONG-TERM CURRENT USE OF INSULIN (HCC): ICD-10-CM

## 2021-06-09 DIAGNOSIS — E11.9 TYPE 2 DIABETES MELLITUS WITHOUT COMPLICATION, WITH LONG-TERM CURRENT USE OF INSULIN (HCC): ICD-10-CM

## 2021-06-09 RX ORDER — GLIMEPIRIDE 4 MG/1
4 TABLET ORAL
Qty: 30 TABLET | Refills: 5 | Status: SHIPPED | OUTPATIENT
Start: 2021-06-09 | End: 2022-02-23 | Stop reason: SDUPTHER

## 2021-06-09 NOTE — TELEPHONE ENCOUNTER
PATIENT HAS CALLED, SHE STATED THAT [facility] ON [facility] DOES NOT HAVE THIS SCRIPT FOR GLIMEPIRIDE 4mg  EVERY MORNING WITH BREAKFAST.      SHE STATED THAT THEY HAVE THE 2mg.   PLEASE ADVISE.

## 2021-08-09 ENCOUNTER — TELEPHONE (OUTPATIENT)
Dept: INTERNAL MEDICINE | Facility: CLINIC | Age: 61
End: 2021-08-09

## 2021-08-09 DIAGNOSIS — Z79.4 TYPE 2 DIABETES MELLITUS WITHOUT COMPLICATION, WITH LONG-TERM CURRENT USE OF INSULIN (HCC): Primary | ICD-10-CM

## 2021-08-09 DIAGNOSIS — E11.9 TYPE 2 DIABETES MELLITUS WITHOUT COMPLICATION, WITH LONG-TERM CURRENT USE OF INSULIN (HCC): Primary | ICD-10-CM

## 2021-08-09 RX ORDER — METFORMIN HYDROCHLORIDE 500 MG/1
1000 TABLET, EXTENDED RELEASE ORAL 2 TIMES DAILY
Qty: 120 TABLET | Refills: 3 | Status: SHIPPED | OUTPATIENT
Start: 2021-08-09 | End: 2021-12-07

## 2021-08-09 RX ORDER — METFORMIN HYDROCHLORIDE 500 MG/1
1000 TABLET, EXTENDED RELEASE ORAL 2 TIMES DAILY
Qty: 180 TABLET | Refills: 3 | Status: CANCELLED | OUTPATIENT
Start: 2021-08-09

## 2021-08-09 NOTE — TELEPHONE ENCOUNTER
Caller: Soledad Ram    Relationship: Self    Best call back number: [phone]    What is the best time to reach you: ANYTIME     Who are you requesting to speak with (clinical staff, provider,  specific staff member):     PCP OR NURSE      Do you know the name of the person who called:   SOLEDAD RAM    What was the call regarding:   PATIENT NEEDS REFILL OF FOLLOWING MEDICATION TO GET HER TO HER APPT ON Wednesday. MED BELOW     Insulin Glargine-Lixisenatide (SOLIQUA) 100-33 UNT-MCG/ML solution pen-injector      metFORMIN ER (GLUCOPHAGE-XR) 500 MG 24 hr tablet          [facility] [address] [place], [place] [address]PR[facility] [phone] [facility] [phone] FX        Do you require a callback:   YES

## 2021-08-16 ENCOUNTER — OFFICE VISIT (OUTPATIENT)
Dept: INTERNAL MEDICINE | Facility: CLINIC | Age: 61
End: 2021-08-16

## 2021-08-16 VITALS
HEART RATE: 87 BPM | TEMPERATURE: 97.2 F | SYSTOLIC BLOOD PRESSURE: 110 MMHG | DIASTOLIC BLOOD PRESSURE: 70 MMHG | HEIGHT: 66 IN | BODY MASS INDEX: 30.7 KG/M2 | WEIGHT: 191 LBS | RESPIRATION RATE: 18 BRPM | OXYGEN SATURATION: 97 %

## 2021-08-16 DIAGNOSIS — E11.65 TYPE 2 DIABETES MELLITUS WITH HYPERGLYCEMIA, WITH LONG-TERM CURRENT USE OF INSULIN (HCC): ICD-10-CM

## 2021-08-16 DIAGNOSIS — I10 ESSENTIAL HYPERTENSION: ICD-10-CM

## 2021-08-16 DIAGNOSIS — J30.9 ALLERGIC RHINITIS, UNSPECIFIED SEASONALITY, UNSPECIFIED TRIGGER: ICD-10-CM

## 2021-08-16 DIAGNOSIS — E11.9 TYPE 2 DIABETES MELLITUS WITHOUT COMPLICATION, WITH LONG-TERM CURRENT USE OF INSULIN (HCC): Primary | ICD-10-CM

## 2021-08-16 DIAGNOSIS — Z79.4 TYPE 2 DIABETES MELLITUS WITH HYPERGLYCEMIA, WITH LONG-TERM CURRENT USE OF INSULIN (HCC): ICD-10-CM

## 2021-08-16 DIAGNOSIS — Z79.4 TYPE 2 DIABETES MELLITUS WITHOUT COMPLICATION, WITH LONG-TERM CURRENT USE OF INSULIN (HCC): Primary | ICD-10-CM

## 2021-08-16 LAB — HBA1C MFR BLD: 10.7 %

## 2021-08-16 PROCEDURE — 83036 HEMOGLOBIN GLYCOSYLATED A1C: CPT | Performed by: INTERNAL MEDICINE

## 2021-08-16 PROCEDURE — 99214 OFFICE O/P EST MOD 30 MIN: CPT | Performed by: INTERNAL MEDICINE

## 2021-08-16 RX ORDER — AZELASTINE 1 MG/ML
2 SPRAY, METERED NASAL 2 TIMES DAILY
Qty: 1 EACH | Refills: 12 | Status: SHIPPED | OUTPATIENT
Start: 2021-08-16 | End: 2023-03-01

## 2021-08-16 NOTE — PROGRESS NOTES
Chief Complaint   Patient presents with   • Diabetes     3 month follow-up   • Hypertension         History:  Soledad Ram is a 61 y.o. female who presents today for evaluation of the above problems.      Presents today for 3-month follow-up on diabetes and hypertension.  For the last visit her A1c was uncontrolled at 8.2%.  She was recommended to continue lifestyle modification and to follow-up at this visit to discuss her diabetes.  Unfortunately, she has not been checking her blood sugar and her A1c is now 10.7%.    Blood pressure is well controlled at 110/70.    Her sinuses are acting up again for the last 2 months despite taking daily Zyrtec and Flonase.  She has not tried Astelin    She has had the COVID-19 vaccine.    She is planning on going camping in [place] next weekend    HPI    ROS:  Review of Systems      Current Outpatient Medications:   •  cyclobenzaprine (FLEXERIL) 10 MG tablet, Take 10 mg by mouth 2 (Two) Times a Day As Needed for Muscle Spasms., Disp: , Rfl:   •  DULoxetine (CYMBALTA) 60 MG capsule, Take 120 mg by mouth. Takes 2 capsules daily, Disp: , Rfl:   •  gabapentin (NEURONTIN) 600 MG tablet, Take 600 mg by mouth Every 8 (Eight) Hours As Needed., Disp: , Rfl:   •  glimepiride (Amaryl) 4 MG tablet, Take 1 tablet by mouth Every Morning Before Breakfast., Disp: 30 tablet, Rfl: 5  •  Insulin Glargine-Lixisenatide (SOLIQUA) 100-33 UNT-MCG/ML solution pen-injector injection, Inject 30 Units under the skin into the appropriate area as directed 2 (two) times a day., Disp: 3 pen, Rfl: 3  •  losartan (COZAAR) 50 MG tablet, Take 1 tablet by mouth once daily, Disp: 90 tablet, Rfl: 3  •  metFORMIN ER (GLUCOPHAGE-XR) 500 MG 24 hr tablet, Take 2 tablets by mouth 2 (two) times a day., Disp: 120 tablet, Rfl: 3  •  oxyCODONE-acetaminophen (PERCOCET)  MG per tablet, Take 1 tablet by mouth Every 6 (Six) Hours As Needed for Moderate Pain ., Disp: , Rfl:   •  polyethylene glycol (MIRALAX) 17 g  packet, Take 17 g by mouth Daily., Disp: 30 packet, Rfl: 5  •  rosuvastatin (CRESTOR) 20 MG tablet, Take 1 tablet by mouth once daily, Disp: 90 tablet, Rfl: 3  •  traZODone (DESYREL) 50 MG tablet, Take 3 tablets by mouth Every Night., Disp: 90 tablet, Rfl: 5  •  azelastine (ASTELIN) 0.1 % nasal spray, 2 sprays into the nostril(s) as directed by provider 2 (Two) Times a Day. Use in each nostril as directed, Disp: 1 each, Rfl: 12    Lab Results   Component Value Date    GLUCOSE 107 (H) 11/09/2020    BUN 15 11/09/2020    CREATININE 0.71 11/09/2020    EGFRIFNONA 85 11/09/2020    BCR 21.1 11/09/2020    K 3.9 11/09/2020    CO2 24.0 11/09/2020    CALCIUM 9.9 11/09/2020    ALBUMIN 4.30 11/09/2020    AST 30 11/09/2020    ALT 26 11/09/2020       WBC   Date Value Ref Range Status   02/05/2020 9.98 3.40 - 10.80 10*3/mm3 Final     RBC   Date Value Ref Range Status   02/05/2020 4.79 3.77 - 5.28 10*6/mm3 Final     Hemoglobin   Date Value Ref Range Status   02/05/2020 14.3 12.0 - 15.9 g/dL Final     Hematocrit   Date Value Ref Range Status   02/05/2020 41.6 34.0 - 46.6 % Final     MCV   Date Value Ref Range Status   02/05/2020 86.8 79.0 - 97.0 fL Final     MCH   Date Value Ref Range Status   02/05/2020 29.9 26.6 - 33.0 pg Final     MCHC   Date Value Ref Range Status   02/05/2020 34.4 31.5 - 35.7 g/dL Final     RDW   Date Value Ref Range Status   02/05/2020 13.3 12.3 - 15.4 % Final     RDW-SD   Date Value Ref Range Status   02/05/2020 42.2 37.0 - 54.0 fl Final     MPV   Date Value Ref Range Status   02/05/2020 10.6 6.0 - 12.0 fL Final     Platelets   Date Value Ref Range Status   02/05/2020 244 140 - 450 10*3/mm3 Final     Neutrophil %   Date Value Ref Range Status   02/05/2020 42.9 42.7 - 76.0 % Final     Lymphocyte %   Date Value Ref Range Status   02/05/2020 36.7 19.6 - 45.3 % Final     Monocyte %   Date Value Ref Range Status   02/05/2020 10.9 5.0 - 12.0 % Final     Eosinophil %   Date Value Ref Range Status   02/05/2020 8.3 (H)  "0.3 - 6.2 % Final     Basophil %   Date Value Ref Range Status   02/05/2020 0.8 0.0 - 1.5 % Final     Immature Grans %   Date Value Ref Range Status   02/05/2020 0.4 0.0 - 0.5 % Final     Neutrophils, Absolute   Date Value Ref Range Status   02/05/2020 4.28 1.70 - 7.00 10*3/mm3 Final     Lymphocytes, Absolute   Date Value Ref Range Status   02/05/2020 3.66 (H) 0.70 - 3.10 10*3/mm3 Final     Monocytes, Absolute   Date Value Ref Range Status   02/05/2020 1.09 (H) 0.10 - 0.90 10*3/mm3 Final     Eosinophils, Absolute   Date Value Ref Range Status   02/05/2020 0.83 (H) 0.00 - 0.40 10*3/mm3 Final     Basophils, Absolute   Date Value Ref Range Status   02/05/2020 0.08 0.00 - 0.20 10*3/mm3 Final     Immature Grans, Absolute   Date Value Ref Range Status   02/05/2020 0.04 0.00 - 0.05 10*3/mm3 Final     nRBC   Date Value Ref Range Status   02/05/2020 0.0 0.0 - 0.2 /100 WBC Final         OBJECTIVE:  Visit Vitals  /70 (BP Location: Left arm, Patient Position: Sitting, Cuff Size: Adult)   Pulse 87   Temp 97.2 °F (36.2 °C) (Temporal)   Resp 18   Ht 167.6 cm (66\")   Wt 86.6 kg (191 lb)   SpO2 97%   BMI 30.83 kg/m²      Physical Exam  Vitals reviewed.   Constitutional:       General: She is not in acute distress.     Appearance: She is well-developed. She is not diaphoretic.      Comments: Pleasant   HENT:      Head: Normocephalic and atraumatic.   Eyes:      General: No scleral icterus.     Pupils: Pupils are equal, round, and reactive to light.   Neck:      Thyroid: No thyromegaly.      Trachea: Phonation normal.   Cardiovascular:      Rate and Rhythm: Normal rate.   Pulmonary:      Effort: No respiratory distress.   Skin:     Coloration: Skin is not jaundiced or pale.      Findings: No erythema.   Neurological:      Mental Status: She is alert.   Psychiatric:         Behavior: Behavior normal.         Thought Content: Thought content normal.         Judgment: Judgment normal.         Assessment/Plan    Diagnoses and all " orders for this visit:    1. Type 2 diabetes mellitus without complication, with long-term current use of insulin (CMS/[facility]) (Primary)  -     POC Glycosylated Hemoglobin (Hb A1C)  -     Insulin Glargine-Lixisenatide (SOLIQUA) 100-33 UNT-MCG/ML solution pen-injector injection; Inject 30 Units under the skin into the appropriate area as directed 2 (two) times a day.  Dispense: 3 pen; Refill: 3    2. Type 2 diabetes mellitus with hyperglycemia, with long-term current use of insulin (CMS/[facility])    3. Allergic rhinitis, unspecified seasonality, unspecified trigger  -     azelastine (ASTELIN) 0.1 % nasal spray; 2 sprays into the nostril(s) as directed by provider 2 (Two) Times a Day. Use in each nostril as directed  Dispense: 1 each; Refill: 12    4. Essential hypertension    Increase her Soliqua to a maximum 30 units twice a day.  She is always been on this medication twice a day and has tolerated it.  Therefore we will continue his twice a day.  Make adjustments if needed.  We will recheck her A1c in 3 months.  She will need additional medication if her diabetes is still poorly controlled.    Blood pressure is well controlled we will continue her current regimen    We will add Astelin nasal spray for allergic rhinitis in addition to her daily Zyrtec and Flonase.    She has had the COVID-19 vaccine.    Follow-up in 3 months with A1c, follow-up sooner if indicated.      Return in about 3 months (around 11/16/2021) for Recheck with A1c.    Patient was given instructions and counseling regarding his/her condition or for health maintenance advice. Please see specific information pulled into the AVS if appropriate.     AURORA Sarabia MD   08:50 CDT  8/16/2021

## 2021-10-15 ENCOUNTER — TELEPHONE (OUTPATIENT)
Dept: INTERNAL MEDICINE | Facility: CLINIC | Age: 61
End: 2021-10-15

## 2021-10-15 NOTE — TELEPHONE ENCOUNTER
PATIENT CALLED IN TO LET PROVIDER KNOW THAT WE WILL HAVE TO CHANGE HER MEDICATION     Insulin Glargine-Lixisenatide (SOLIQUA) 100-33 UNT-MCG/ML solution pen-injector injection      TO SOMETHING ELSE   HER PART IS A LITTLE OVER $300 AND THERE IS NO WAY SHE CAN AFFORD THAT       GOOD CALL BACK   [phone]

## 2021-10-21 ENCOUNTER — TELEPHONE (OUTPATIENT)
Dept: INTERNAL MEDICINE | Facility: CLINIC | Age: 61
End: 2021-10-21

## 2021-10-21 NOTE — TELEPHONE ENCOUNTER
Caller: Soledad Ram    Relationship: Self    Best call back number: [phone]    What is the best time to reach you: ANY    Who are you requesting to speak with (clinical staff, provider,  specific staff member): CLINICAL      What was the call regarding: PATIENT STATES THE   Insulin Glargine-Lixisenatide (SOLIQUA) 100-33 UNT-MCG/ML solution pen-injector injection  IS GETTING TOO EXPENSIVE AND SHE CAN NO LONGER AFFORD IT. SHE IS WONDERING IF YOU CAN SWITCH HER TO ANYTHING ELSE    Do you require a callback: YES

## 2021-11-02 ENCOUNTER — TELEPHONE (OUTPATIENT)
Dept: INTERNAL MEDICINE | Facility: CLINIC | Age: 61
End: 2021-11-02

## 2021-11-02 NOTE — TELEPHONE ENCOUNTER
PATIENT CALLED AND  STATES THE   Insulin Glargine-Lixisenatide (SOLIQUA) 100-33 UNT-MCG/ML solution pen-injector injection  IS GETTING TOO EXPENSIVE AND SHE CAN NO LONGER AFFORD IT. SHE IS WONDERING IF YOU CAN SWITCH HER TO ANYTHING ELSE  [phone]    PATIENT HAS MADE AN APPOINTMENT FOR 11/03/2021 TO DISCUSS THE SOLIQUA.

## 2021-11-03 ENCOUNTER — OFFICE VISIT (OUTPATIENT)
Dept: INTERNAL MEDICINE | Facility: CLINIC | Age: 61
End: 2021-11-03

## 2021-11-03 ENCOUNTER — LAB (OUTPATIENT)
Dept: LAB | Facility: HOSPITAL | Age: 61
End: 2021-11-03

## 2021-11-03 VITALS
HEART RATE: 88 BPM | OXYGEN SATURATION: 95 % | HEIGHT: 66 IN | WEIGHT: 190.1 LBS | BODY MASS INDEX: 30.55 KG/M2 | SYSTOLIC BLOOD PRESSURE: 118 MMHG | DIASTOLIC BLOOD PRESSURE: 66 MMHG

## 2021-11-03 DIAGNOSIS — Z79.4 TYPE 2 DIABETES MELLITUS WITHOUT COMPLICATION, WITH LONG-TERM CURRENT USE OF INSULIN (HCC): Primary | ICD-10-CM

## 2021-11-03 DIAGNOSIS — J06.9 VIRAL UPPER RESPIRATORY TRACT INFECTION: ICD-10-CM

## 2021-11-03 DIAGNOSIS — E66.9 OBESITY (BMI 30.0-34.9): ICD-10-CM

## 2021-11-03 DIAGNOSIS — E11.9 TYPE 2 DIABETES MELLITUS WITHOUT COMPLICATION, WITH LONG-TERM CURRENT USE OF INSULIN (HCC): Primary | ICD-10-CM

## 2021-11-03 DIAGNOSIS — J01.00 ACUTE NON-RECURRENT MAXILLARY SINUSITIS: ICD-10-CM

## 2021-11-03 LAB — SARS-COV-2 RNA PNL SPEC NAA+PROBE: NOT DETECTED

## 2021-11-03 PROCEDURE — C9803 HOPD COVID-19 SPEC COLLECT: HCPCS

## 2021-11-03 PROCEDURE — 87635 SARS-COV-2 COVID-19 AMP PRB: CPT

## 2021-11-03 PROCEDURE — 99214 OFFICE O/P EST MOD 30 MIN: CPT | Performed by: NURSE PRACTITIONER

## 2021-11-03 RX ORDER — FLUTICASONE PROPIONATE 50 MCG
2 SPRAY, SUSPENSION (ML) NASAL DAILY
Qty: 16 G | Refills: 0 | Status: SHIPPED | OUTPATIENT
Start: 2021-11-03

## 2021-11-03 RX ORDER — DESONIDE 0.5 MG/G
1 CREAM TOPICAL 2 TIMES DAILY
COMMUNITY
End: 2023-03-07

## 2021-11-03 RX ORDER — AMOXICILLIN 875 MG/1
875 TABLET, COATED ORAL 2 TIMES DAILY
Qty: 20 TABLET | Refills: 0 | Status: SHIPPED | OUTPATIENT
Start: 2021-11-03 | End: 2021-11-15

## 2021-11-03 RX ORDER — SEMAGLUTIDE 1.34 MG/ML
INJECTION, SOLUTION SUBCUTANEOUS
Qty: 1.5 ML | Refills: 5 | Status: SHIPPED | OUTPATIENT
Start: 2021-11-03 | End: 2021-11-15

## 2021-11-04 ENCOUNTER — TELEPHONE (OUTPATIENT)
Dept: INTERNAL MEDICINE | Facility: CLINIC | Age: 61
End: 2021-11-04

## 2021-11-04 NOTE — TELEPHONE ENCOUNTER
PATIENT HAS CALLED, SHE STATED THAT THE NEW MEDICATION  ( OZEMPIC)  FOR HER DIABETES WILL COST MORE THAT THE ONE SHE IS CURRENTLY ON.  [phone]

## 2021-11-04 NOTE — PROGRESS NOTES
Chief Complaint   Patient presents with   • Follow-up     asking for medication change d/t cost   • Diabetes   • Sinusitis   • URI         History:  Soledad Ram is a 61 y.o. female who presents today for evaluation of the above problems.          She was on the schedule today for a visit to discuss changing her Soliqua to something else because her insurance is not covering it, and it is costing her $250/month out-of-pocket.  Her fasting glucose today was 163.  She has been taking Soliqua 30 units/day for Type 2 diabetes, and she is also on Amaryl 4 mg daily, metformin 500 mg (2 tablets twice daily).  Her last A1C on 8/16/21 was 10.7%.  Occasionally, she will feel her sugar may be low, and she will eat peanut butter/crackers and feel better.    She also mentions she has had sinus pressure for 1 week.  Started with drainage in throat, has progressed to right side sinus pain, some left as well.  Not really coughing, no fever, no loss of taste/smell, no body aches.  No known exposure to Covid, and she has had 3 Covid vaccines now.      ROS:  Review of Systems  As above    Allergies   Allergen Reactions   • Lisinopril Swelling     FACE SWELLS   • Erythromycin Rash     Past Medical History:   Diagnosis Date   • Diabetes mellitus (HCC)    • Infection      Past Surgical History:   Procedure Laterality Date   • CARPAL TUNNEL RELEASE     • TONSILLECTOMY     • TUBAL ABDOMINAL LIGATION       Family History   Problem Relation Age of Onset   • Lung cancer Mother    • Lung cancer Father    • Lung cancer Sister    • Heart disease Paternal Grandfather    • Breast cancer Neg Hx      Soledad  reports that she has never smoked. She has never used smokeless tobacco. She reports that she does not drink alcohol and does not use drugs.    I have reviewed and updated the above documentation (if necessary) including but not limited to chief complaint, ROS, PFSH, allergies and medications        Current Outpatient Medications:   •   azelastine (ASTELIN) 0.1 % nasal spray, 2 sprays into the nostril(s) as directed by provider 2 (Two) Times a Day. Use in each nostril as directed, Disp: 1 each, Rfl: 12  •  cyclobenzaprine (FLEXERIL) 10 MG tablet, Take 10 mg by mouth 2 (Two) Times a Day As Needed for Muscle Spasms., Disp: , Rfl:   •  desonide (DESOWEN) 0.05 % cream, Apply 1 application topically to the appropriate area as directed 2 (Two) Times a Day., Disp: , Rfl:   •  DULoxetine (CYMBALTA) 60 MG capsule, Take 120 mg by mouth. Takes 2 capsules daily, Disp: , Rfl:   •  gabapentin (NEURONTIN) 600 MG tablet, Take 600 mg by mouth Every 8 (Eight) Hours As Needed., Disp: , Rfl:   •  glimepiride (Amaryl) 4 MG tablet, Take 1 tablet by mouth Every Morning Before Breakfast., Disp: 30 tablet, Rfl: 5  •  losartan (COZAAR) 50 MG tablet, Take 1 tablet by mouth once daily, Disp: 90 tablet, Rfl: 3  •  metFORMIN ER (GLUCOPHAGE-XR) 500 MG 24 hr tablet, Take 2 tablets by mouth 2 (two) times a day., Disp: 120 tablet, Rfl: 3  •  NON FORMULARY, [place] pain cream, Disp: , Rfl:   •  oxyCODONE-acetaminophen (PERCOCET)  MG per tablet, Take 1 tablet by mouth Every 6 (Six) Hours As Needed for Moderate Pain ., Disp: , Rfl:   •  Rhubarb (ESTROVEN MENOPAUSE RELIEF PO), Take  by mouth., Disp: , Rfl:   •  rosuvastatin (CRESTOR) 20 MG tablet, Take 1 tablet by mouth once daily, Disp: 90 tablet, Rfl: 3  •  traZODone (DESYREL) 50 MG tablet, Take 3 tablets by mouth Every Night., Disp: 90 tablet, Rfl: 5  •  amoxicillin (AMOXIL) 875 MG tablet, Take 1 tablet by mouth 2 (Two) Times a Day., Disp: 20 tablet, Rfl: 0  •  fluticasone (Flonase) 50 MCG/ACT nasal spray, 2 sprays into the nostril(s) as directed by provider Daily., Disp: 16 g, Rfl: 0  •  Insulin Glargine (LANTUS SOLOSTAR) 100 UNIT/ML injection pen, Inject 20 Units under the skin into the appropriate area as directed 2 (Two) Times a Day for 90 days., Disp: 12 pen, Rfl: 0  •  polyethylene glycol (MIRALAX) 17 g packet, Take  "17 g by mouth Daily., Disp: 30 packet, Rfl: 5  •  Semaglutide,0.25 or 0.5MG/DOS, (Ozempic, 0.25 or 0.5 MG/DOSE,) 2 MG/1.5ML solution pen-injector, Inject .25 mg weekly for 4 weeks, then increase to .5 mg weekly., Disp: 1.5 mL, Rfl: 5    PHQ-9 Depression Screening  Little interest or pleasure in doing things?     Feeling down, depressed, or hopeless?     Trouble falling or staying asleep, or sleeping too much?     Feeling tired or having little energy?     Poor appetite or overeating?     Feeling bad about yourself - or that you are a failure or have let yourself or your family down?     Trouble concentrating on things, such as reading the newspaper or watching television?     Moving or speaking so slowly that other people could have noticed? Or the opposite - being so fidgety or restless that you have been moving around a lot more than usual?     Thoughts that you would be better off dead, or of hurting yourself in some way?     PHQ-9 Total Score     If you checked off any problems, how difficult have these problems made it for you to do your work, take care of things at home, or get along with other people?       PHQ-9 Total Score:      OBJECTIVE:  Visit Vitals  /66 (BP Location: Right arm, Patient Position: Sitting, Cuff Size: Adult)   Pulse 88   Ht 167.6 cm (66\")   Wt 86.2 kg (190 lb 1.6 oz)   SpO2 95%   BMI 30.68 kg/m²      Physical Exam  Vitals and nursing note reviewed.   Constitutional:       General: She is not in acute distress.     Appearance: Normal appearance. She is not ill-appearing or toxic-appearing.   HENT:      Head: Normocephalic and atraumatic.      Comments: Maxillary sinus tenderness on right.     Right Ear: Tympanic membrane, ear canal and external ear normal. There is no impacted cerumen.      Left Ear: Tympanic membrane, ear canal and external ear normal. There is no impacted cerumen.      Ears:      Comments: Both TM's have diminished light reflex.     Nose: Nose normal. No congestion " or rhinorrhea.      Mouth/Throat:      Mouth: Mucous membranes are moist.      Pharynx: No oropharyngeal exudate or posterior oropharyngeal erythema.      Comments: Post-nasal drainage  Eyes:      General: No scleral icterus.        Right eye: No discharge.         Left eye: No discharge.      Conjunctiva/sclera: Conjunctivae normal.      Pupils: Pupils are equal, round, and reactive to light.   Neck:      Vascular: No carotid bruit (tender bilateral submandibular nodes).   Cardiovascular:      Rate and Rhythm: Normal rate and regular rhythm.      Heart sounds: Normal heart sounds.   Pulmonary:      Effort: Pulmonary effort is normal. No respiratory distress.      Breath sounds: Normal breath sounds. No stridor. No wheezing, rhonchi or rales.   Abdominal:      General: There is no distension.      Palpations: Abdomen is soft.      Tenderness: There is no abdominal tenderness.   Musculoskeletal:      Cervical back: Normal range of motion and neck supple. No rigidity or tenderness.      Right lower leg: No edema.      Left lower leg: No edema.   Lymphadenopathy:      Cervical: Cervical adenopathy present.   Skin:     General: Skin is warm and dry.      Findings: No rash.   Neurological:      Mental Status: She is alert and oriented to person, place, and time.   Psychiatric:         Mood and Affect: Mood normal.         Behavior: Behavior normal.         Thought Content: Thought content normal.         Judgment: Judgment normal.         [facility]    Assessment/Plan    Diagnoses and all orders for this visit:    1. Type 2 diabetes mellitus without complication, with long-term current use of insulin (HCC) (Primary)  -     Insulin Glargine (LANTUS SOLOSTAR) 100 UNIT/ML injection pen; Inject 20 Units under the skin into the appropriate area as directed 2 (Two) Times a Day for 90 days.  Dispense: 12 pen; Refill: 0  -     Semaglutide,0.25 or 0.5MG/DOS, (Ozempic, 0.25 or 0.5 MG/DOSE,) 2 MG/1.5ML solution pen-injector; Inject .25 mg  weekly for 4 weeks, then increase to .5 mg weekly.  Dispense: 1.5 mL; Refill: 5    2. Viral upper respiratory tract infection  -     fluticasone (Flonase) 50 MCG/ACT nasal spray; 2 sprays into the nostril(s) as directed by provider Daily.  Dispense: 16 g; Refill: 0  -     COVID PRE-OP / PRE-PROCEDURE SCREENING ORDER (NO ISOLATION) - Swab, Nasal Cavity; Future    3. Acute non-recurrent maxillary sinusitis  -     amoxicillin (AMOXIL) 875 MG tablet; Take 1 tablet by mouth 2 (Two) Times a Day.  Dispense: 20 tablet; Refill: 0  -     fluticasone (Flonase) 50 MCG/ACT nasal spray; 2 sprays into the nostril(s) as directed by provider Daily.  Dispense: 16 g; Refill: 0    4. Obesity (BMI 30.0-34.9)    I have discussed the changing of the Soliqua with Dr. Sarabia today.  Will change to Lantus and Ozempic as above, and hoping her insurance will cover better.  Reviewed low-sugar, low-carb diet plan, and advised regular exercise as well.    Doubt Covid since she has had the initial 2 vaccines and a booster; however, will screen for this today.  Will begin antibiotics and Flonase since her sinus symptoms are progressing after a week.    Patient's Body mass index is 30.68 kg/m². indicating that she is obese (BMI >30). Obesity-related health conditions include the following: hypertension and diabetes mellitus. Obesity is unchanged. BMI is is above average; BMI management plan is completed. We discussed low calorie, low carb based diet program, portion control and increasing exercise..      Education materials and an After Visit Summary were printed and given to the patient at discharge.  Return for Next scheduled follow up.         Gracia Tran, MARTY   23:59 CDT  11/4/2021

## 2021-11-05 NOTE — TELEPHONE ENCOUNTER
SPOKE WITH [facility] AND THEY TRIED A GOOD RX CARD CAUSED THE OZEMPIC TO GO HIGHER IN PRICE.

## 2021-11-15 ENCOUNTER — OFFICE VISIT (OUTPATIENT)
Dept: INTERNAL MEDICINE | Facility: CLINIC | Age: 61
End: 2021-11-15

## 2021-11-15 VITALS
SYSTOLIC BLOOD PRESSURE: 112 MMHG | HEIGHT: 66 IN | TEMPERATURE: 98.3 F | DIASTOLIC BLOOD PRESSURE: 73 MMHG | HEART RATE: 83 BPM | WEIGHT: 189 LBS | BODY MASS INDEX: 30.37 KG/M2 | OXYGEN SATURATION: 98 % | RESPIRATION RATE: 15 BRPM

## 2021-11-15 DIAGNOSIS — Z79.4 TYPE 2 DIABETES MELLITUS WITH HYPERGLYCEMIA, WITH LONG-TERM CURRENT USE OF INSULIN (HCC): ICD-10-CM

## 2021-11-15 DIAGNOSIS — E11.9 TYPE 2 DIABETES MELLITUS WITHOUT COMPLICATION, WITH LONG-TERM CURRENT USE OF INSULIN (HCC): Primary | ICD-10-CM

## 2021-11-15 DIAGNOSIS — E11.65 TYPE 2 DIABETES MELLITUS WITH HYPERGLYCEMIA, WITH LONG-TERM CURRENT USE OF INSULIN (HCC): ICD-10-CM

## 2021-11-15 DIAGNOSIS — Z79.4 ENCOUNTER FOR LONG-TERM (CURRENT) USE OF INSULIN (HCC): ICD-10-CM

## 2021-11-15 DIAGNOSIS — Z79.4 TYPE 2 DIABETES MELLITUS WITHOUT COMPLICATION, WITH LONG-TERM CURRENT USE OF INSULIN (HCC): Primary | ICD-10-CM

## 2021-11-15 LAB
EXPIRATION DATE: ABNORMAL
HBA1C MFR BLD: 11.4 %
Lab: ABNORMAL

## 2021-11-15 PROCEDURE — 83036 HEMOGLOBIN GLYCOSYLATED A1C: CPT | Performed by: INTERNAL MEDICINE

## 2021-11-15 PROCEDURE — 3046F HEMOGLOBIN A1C LEVEL >9.0%: CPT | Performed by: INTERNAL MEDICINE

## 2021-11-15 PROCEDURE — 99214 OFFICE O/P EST MOD 30 MIN: CPT | Performed by: INTERNAL MEDICINE

## 2021-11-15 RX ORDER — INSULIN DETEMIR 100 [IU]/ML
20 INJECTION, SOLUTION SUBCUTANEOUS 2 TIMES DAILY
Qty: 3 PEN | Refills: 5 | Status: SHIPPED | OUTPATIENT
Start: 2021-11-15 | End: 2021-12-15

## 2021-11-15 NOTE — PROGRESS NOTES
Chief Complaint   Patient presents with   • Follow-up   • Diabetes         History:  Soledad Ram is a 61 y.o. female who presents today for evaluation of the above problems.    She presents today for 3-month follow-up to recheck her A1c.  She has not been taking Ozempic due to the cost.  Her last A1c on August 16, 2021 was 10.7.  She was on the maximum of Soliqua, but this medication was too expensive as well.    She is taking Amaryl 4 mg, and Metformin 1000 mg twice a day.  Her generous neighbor gave her the money for her to keep taking it the Soliqua.  She could not afford the Lantus that is it was $400.    Her A1c today is 11.4%.  She reports very good diet at home and does not eat very many carbohydrates.      HPI      ROS:  Review of Systems  See above    Current Outpatient Medications:   •  azelastine (ASTELIN) 0.1 % nasal spray, 2 sprays into the nostril(s) as directed by provider 2 (Two) Times a Day. Use in each nostril as directed, Disp: 1 each, Rfl: 12  •  cyclobenzaprine (FLEXERIL) 10 MG tablet, Take 10 mg by mouth 2 (Two) Times a Day As Needed for Muscle Spasms., Disp: , Rfl:   •  desonide (DESOWEN) 0.05 % cream, Apply 1 application topically to the appropriate area as directed 2 (Two) Times a Day., Disp: , Rfl:   •  DULoxetine (CYMBALTA) 60 MG capsule, Take 120 mg by mouth. Takes 2 capsules daily, Disp: , Rfl:   •  fluticasone (Flonase) 50 MCG/ACT nasal spray, 2 sprays into the nostril(s) as directed by provider Daily., Disp: 16 g, Rfl: 0  •  gabapentin (NEURONTIN) 600 MG tablet, Take 600 mg by mouth Every 8 (Eight) Hours As Needed., Disp: , Rfl:   •  glimepiride (Amaryl) 4 MG tablet, Take 1 tablet by mouth Every Morning Before Breakfast., Disp: 30 tablet, Rfl: 5  •  losartan (COZAAR) 50 MG tablet, Take 1 tablet by mouth once daily, Disp: 90 tablet, Rfl: 3  •  metFORMIN ER (GLUCOPHAGE-XR) 500 MG 24 hr tablet, Take 2 tablets by mouth 2 (two) times a day., Disp: 120 tablet, Rfl: 3  •  NON FORMULARY,  [facility] pain cream, Disp: , Rfl:   •  oxyCODONE-acetaminophen (PERCOCET)  MG per tablet, Take 1 tablet by mouth Every 6 (Six) Hours As Needed for Moderate Pain ., Disp: , Rfl:   •  polyethylene glycol (MIRALAX) 17 g packet, Take 17 g by mouth Daily., Disp: 30 packet, Rfl: 5  •  Rhubarb (ESTROVEN MENOPAUSE RELIEF PO), Take  by mouth., Disp: , Rfl:   •  rosuvastatin (CRESTOR) 20 MG tablet, Take 1 tablet by mouth once daily, Disp: 90 tablet, Rfl: 3  •  traZODone (DESYREL) 50 MG tablet, Take 3 tablets by mouth Every Night., Disp: 90 tablet, Rfl: 5  •  empagliflozin (Jardiance) 10 MG tablet tablet, Take 1 tablet by mouth Daily., Disp: 30 tablet, Rfl: 0  •  insulin detemir (Levemir FlexTouch) 100 UNIT/ML injection, Inject 20 Units under the skin into the appropriate area as directed 2 (Two) Times a Day., Disp: 3 pen, Rfl: 5    Lab Results   Component Value Date    GLUCOSE 107 (H) 11/09/2020    BUN 15 11/09/2020    CREATININE 0.71 11/09/2020    EGFRIFNONA 85 11/09/2020    BCR 21.1 11/09/2020    K 3.9 11/09/2020    CO2 24.0 11/09/2020    CALCIUM 9.9 11/09/2020    ALBUMIN 4.30 11/09/2020    AST 30 11/09/2020    ALT 26 11/09/2020       WBC   Date Value Ref Range Status   02/05/2020 9.98 3.40 - 10.80 10*3/mm3 Final     RBC   Date Value Ref Range Status   02/05/2020 4.79 3.77 - 5.28 10*6/mm3 Final     Hemoglobin   Date Value Ref Range Status   02/05/2020 14.3 12.0 - 15.9 g/dL Final     Hematocrit   Date Value Ref Range Status   02/05/2020 41.6 34.0 - 46.6 % Final     MCV   Date Value Ref Range Status   02/05/2020 86.8 79.0 - 97.0 fL Final     MCH   Date Value Ref Range Status   02/05/2020 29.9 26.6 - 33.0 pg Final     MCHC   Date Value Ref Range Status   02/05/2020 34.4 31.5 - 35.7 g/dL Final     RDW   Date Value Ref Range Status   02/05/2020 13.3 12.3 - 15.4 % Final     RDW-SD   Date Value Ref Range Status   02/05/2020 42.2 37.0 - 54.0 fl Final     MPV   Date Value Ref Range Status   02/05/2020 10.6 6.0 - 12.0 fL  "Final     Platelets   Date Value Ref Range Status   02/05/2020 244 140 - 450 10*3/mm3 Final     Neutrophil %   Date Value Ref Range Status   02/05/2020 42.9 42.7 - 76.0 % Final     Lymphocyte %   Date Value Ref Range Status   02/05/2020 36.7 19.6 - 45.3 % Final     Monocyte %   Date Value Ref Range Status   02/05/2020 10.9 5.0 - 12.0 % Final     Eosinophil %   Date Value Ref Range Status   02/05/2020 8.3 (H) 0.3 - 6.2 % Final     Basophil %   Date Value Ref Range Status   02/05/2020 0.8 0.0 - 1.5 % Final     Immature Grans %   Date Value Ref Range Status   02/05/2020 0.4 0.0 - 0.5 % Final     Neutrophils, Absolute   Date Value Ref Range Status   02/05/2020 4.28 1.70 - 7.00 10*3/mm3 Final     Lymphocytes, Absolute   Date Value Ref Range Status   02/05/2020 3.66 (H) 0.70 - 3.10 10*3/mm3 Final     Monocytes, Absolute   Date Value Ref Range Status   02/05/2020 1.09 (H) 0.10 - 0.90 10*3/mm3 Final     Eosinophils, Absolute   Date Value Ref Range Status   02/05/2020 0.83 (H) 0.00 - 0.40 10*3/mm3 Final     Basophils, Absolute   Date Value Ref Range Status   02/05/2020 0.08 0.00 - 0.20 10*3/mm3 Final     Immature Grans, Absolute   Date Value Ref Range Status   02/05/2020 0.04 0.00 - 0.05 10*3/mm3 Final     nRBC   Date Value Ref Range Status   02/05/2020 0.0 0.0 - 0.2 /100 WBC Final         OBJECTIVE:  Visit Vitals  /73 (BP Location: Left arm, Patient Position: Sitting, Cuff Size: Adult)   Pulse 83   Temp 98.3 °F (36.8 °C) (Oral)   Resp 15   Ht 167.6 cm (65.98\")   Wt 85.7 kg (189 lb)   SpO2 98%   BMI 30.52 kg/m²      Physical Exam  Vitals reviewed.   Constitutional:       General: She is not in acute distress.     Appearance: She is well-developed. She is not diaphoretic.      Comments: Very pleasant   HENT:      Head: Normocephalic and atraumatic.   Eyes:      Pupils: Pupils are equal, round, and reactive to light.   Neck:      Thyroid: No thyromegaly.      Trachea: Phonation normal.   Pulmonary:      Effort: No " respiratory distress.   Skin:     Coloration: Skin is not pale.      Findings: No erythema.   Neurological:      Mental Status: She is alert.   Psychiatric:         Behavior: Behavior normal.         Thought Content: Thought content normal.         Judgment: Judgment normal.         Assessment/Plan    Diagnoses and all orders for this visit:    1. Type 2 diabetes mellitus without complication, with long-term current use of insulin ([facility]) (Primary)  -     POC Glycosylated Hemoglobin (Hb A1C)  -     insulin detemir (Levemir FlexTouch) 100 UNIT/ML injection; Inject 20 Units under the skin into the appropriate area as directed 2 (Two) Times a Day.  Dispense: 3 pen; Refill: 5  -     empagliflozin (Jardiance) 10 MG tablet tablet; Take 1 tablet by mouth Daily.  Dispense: 30 tablet; Refill: 0    2. Type 2 diabetes mellitus with hyperglycemia, with long-term current use of insulin ([facility])    3. Encounter for long-term (current) use of insulin ([facility])      Her diabetes is very uncontrolled with an A1c of 11.4%.  This is mostly related to her insurance no longer paying for her medications.  She is taking Amaryl and Metformin, but they stopped paying for her Soliqua.  We split up the medications at the last visit and to Ozempic and Lantus.  However, insurance would not cover these medications very well and she could not afford them.    I have looked at her formulary, and Levemir is covered.  I am going to prescribe Levemir 20 units twice a day, and Jardiance 10 mg daily which is also covered.    I had like her to keep a blood sugar log and bring it with her at the next visit.  She will let us know if her blood sugar increases above 200 as we can make adjustments in her insulin regimen.  I would also like her to let us know if insurance does not pay for these new medications.      Return in about 4 weeks (around 12/13/2021) for Recheck BS.      D. Navjot Sarabia MD  10:26 CST  11/15/2021    [facility]

## 2021-11-15 NOTE — PATIENT INSTRUCTIONS
-bring your blood sugar log with you to the next visit.  -Call us if your BS increases over 200    Empagliflozin oral tablets  What is this medicine?  EMPAGLIFLOZIN (EM pa gli FLOE zin) helps to treat type 2 diabetes. It helps to control blood sugar. This drug may also reduce the risk of heart attack or stroke if you have type 2 diabetes and risk factors for heart disease. Treatment is combined with diet and exercise.  This medicine may be used for other purposes; ask your health care provider or pharmacist if you have questions.  COMMON BRAND NAME(S): Jardiance  What should I tell my health care provider before I take this medicine?  They need to know if you have any of these conditions:  · dehydration  · diabetic ketoacidosis  · diet low in salt  · eating less due to illness, surgery, dieting, or any other reason  · having surgery  · high cholesterol  · high levels of potassium in the blood  · history of pancreatitis or pancreas problems  · history of yeast infection of the penis or vagina  · if you often drink alcohol  · infections in the bladder, kidneys, or urinary tract  · kidney disease  · liver disease  · low blood pressure  · on hemodialysis  · problems urinating  · type 1 diabetes  · uncircumcised male  · an unusual or allergic reaction to empagliflozin, other medicines, foods, dyes, or preservatives  · pregnant or trying to get pregnant  · breast-feeding  How should I use this medicine?  Take this medicine by mouth with a glass of water. Follow the directions on the prescription label. Take it in the morning, with or without food. Take your dose at the same time each day. Do not take more often than directed. Do not stop taking except on your doctor's advice.  Talk to your pediatrician regarding the use of this medicine in children. Special care may be needed.  Overdosage: If you think you have taken too much of this medicine contact a poison control center or emergency room at once.  NOTE: This medicine  is only for you. Do not share this medicine with others.  What if I miss a dose?  If you miss a dose, take it as soon as you can. If it is almost time for your next dose, take only that dose. Do not take double or extra doses.  What may interact with this medicine?  Do not take this medicine with any of the following medications:  · gatifloxacin  This medicine may also interact with the following medications:  · alcohol  · certain medicines for blood pressure, heart disease  · diuretics  This list may not describe all possible interactions. Give your health care provider a list of all the medicines, herbs, non-prescription drugs, or dietary supplements you use. Also tell them if you smoke, drink alcohol, or use illegal drugs. Some items may interact with your medicine.  What should I watch for while using this medicine?  Visit your doctor or health care professional for regular checks on your progress.  This medicine can cause a serious condition in which there is too much acid in the blood. If you develop nausea, vomiting, stomach pain, unusual tiredness, or breathing problems, stop taking this medicine and call your doctor right away. If possible, use a ketone dipstick to check for ketones in your urine.  A test called the HbA1C (A1C) will be monitored. This is a simple blood test. It measures your blood sugar control over the last 2 to 3 months. You will receive this test every 3 to 6 months.  Learn how to check your blood sugar. Learn the symptoms of low and high blood sugar and how to manage them.  Always carry a quick-source of sugar with you in case you have symptoms of low blood sugar. Examples include hard sugar candy or glucose tablets. Make sure others know that you can choke if you eat or drink when you develop serious symptoms of low blood sugar, such as seizures or unconsciousness. They must get medical help at once.  Tell your doctor or health care professional if you have high blood sugar. You might  need to change the dose of your medicine. If you are sick or exercising more than usual, you might need to change the dose of your medicine.  Do not skip meals. Ask your doctor or health care professional if you should avoid alcohol. Many nonprescription cough and cold products contain sugar or alcohol. These can affect blood sugar.  Wear a medical ID bracelet or chain, and carry a card that describes your disease and details of your medicine and dosage times.  What side effects may I notice from receiving this medicine?  Side effects that you should report to your doctor or health care professional as soon as possible:  · allergic reactions like skin rash, itching or hives, swelling of the face, lips, or tongue  · breathing problems  · dizziness  · feeling faint or lightheaded, falls  · muscle weakness  · nausea, vomiting, unusual stomach upset or pain  · penile discharge, itching, or pain in men  · signs and symptoms of a genital infection, such as fever; tenderness, redness, or swelling in the genitals or area from the genitals to the back of the rectum  · signs and symptoms of low blood sugar such as feeling anxious, confusion, dizziness, increased hunger, unusually weak or tired, sweating, shakiness, cold, irritable, headache, blurred vision, fast heartbeat, loss of consciousness  · signs and symptoms of a urinary tract infection, such as fever, chills, a burning feeling when urinating, blood in the urine, back pain  · trouble passing urine or change in the amount of urine, including an urgent need to urinate more often, in larger amounts, or at night  · unusual tiredness  · vaginal discharge, itching, or odor in women  Side effects that usually do not require medical attention (report to your doctor or health care professional if they continue or are bothersome):  · mild increase in urination  · thirsty  This list may not describe all possible side effects. Call your doctor for medical advice about side  effects. You may report side effects to FDA at 8-059-PIH-7270.  Where should I keep my medicine?  Keep out of the reach of children.  Store at room temperature between 20 and 25 degrees C (68 and 77 degrees F). Throw away any unused medicine after the expiration date.  NOTE: This sheet is a summary. It may not cover all possible information. If you have questions about this medicine, talk to your doctor, pharmacist, or health care provider.  © 2021 [employer] Standard (2018-08-30 10:25:34)  Insulin Detemir injection  What is this medicine?  INSULIN DETEMIR (IN [place] l[place]ie DE te chuy) is a human-made form of insulin. This drug lowers the amount of sugar in your blood. It is a long-acting insulin that is usually given once or twice a day.  This medicine may be used for other purposes; ask your health care provider or pharmacist if you have questions.  COMMON BRAND NAME(S): Levemir, Levemir FlexTouch  What should I tell my health care provider before I take this medicine?  They need to know if you have any of these conditions:  · episodes of low blood sugar  · eye disease, vision problems  · kidney disease  · liver disease  · an unusual or allergic reaction to insulin, metacresol, other medicines, foods, dyes, or preservatives  · pregnant or trying to get pregnant  · breast-feeding  How should I use this medicine?  This medicine is for injection under the skin. Take this medicine at the same time(s) each day. Use exactly as directed. This insulin should never be mixed in the same syringe with other insulins before injection. Do not vigorously shake insulin before use. You will be taught how to adjust doses for activities and illness. Do not use more insulin than prescribed. Do not use more or less often than prescribed.  Always check the appearance of your insulin before using it. This medicine should be clear and colorless like water. Do not use if it is cloudy, thickened, colored, or has solid particles in it. If you  use a pen, be sure to take off the outer needle cover before using the dose. It is important that you put your used needles and syringes in a special sharps container. Do not put them in a trash can. If you do not have a sharps container, call your pharmacist or healthcare provider to get one.  This drug comes with INSTRUCTIONS FOR USE. Ask your pharmacist for directions on how to use this drug. Read the information carefully. Talk to your pharmacist or health care provider if you have questions.  Talk to your pediatrician regarding the use of this medicine in children. While this drug may be prescribed for children as young as 2 years for selected conditions, precautions do apply.  Overdosage: If you think you have taken too much of this medicine contact a poison control center or emergency room at once.  NOTE: This medicine is only for you. Do not share this medicine with others.  What if I miss a dose?  It is important not to miss a dose. Your health care professional or doctor should discuss a plan for missed doses with you. If you do miss a dose, follow their plan. Do not take double doses.  What may interact with this medicine?  · other medicines for diabetes  Many medications may cause changes in blood sugar, these include:  · alcohol containing beverages  · antiviral medicines for HIV or AIDS  · aspirin and aspirin-like drugs  · certain medicines for blood pressure, heart disease, irregular heart beat  · chromium  · diuretics  · female hormones, such as estrogens or progestins, birth control pills  · fenofibrate  · gemfibrozil  · isoniazid  · lanreotide  · male hormones or anabolic steroids  · MAOIs like Carbex, Eldepryl, Marplan, Nardil, and Parnate  · medicines for weight loss  · medicines for allergies, asthma, cold, or cough  · medicines for depression, anxiety, or psychotic disturbances  · niacin  · nicotine  · NSAIDs, medicines for pain and inflammation, like ibuprofen or  naproxen  · octreotide  · pasireotide  · pentamidine  · phenytoin  · probenecid  · quinolone antibiotics such as ciprofloxacin, levofloxacin, ofloxacin  · some herbal dietary supplements  · steroid medicines such as prednisone or cortisone  · sulfamethoxazole; trimethoprim  · thyroid hormones  Some medications can hide the warning symptoms of low blood sugar (hypoglycemia). You may need to monitor your blood sugar more closely if you are taking one of these medications. These include:  · beta-blockers, often used for high blood pressure or heart problems (examples include atenolol, metoprolol, propranolol)  · clonidine  · guanethidine  · reserpine  This list may not describe all possible interactions. Give your health care provider a list of all the medicines, herbs, non-prescription drugs, or dietary supplements you use. Also tell them if you smoke, drink alcohol, or use illegal drugs. Some items may interact with your medicine.  What should I watch for while using this medicine?  Visit your health care professional or doctor for regular checks on your progress.  A test called the HbA1C (A1C) will be monitored. This is a simple blood test. It measures your blood sugar control over the last 2 to 3 months. You will receive this test every 3 to 6 months.  Learn how to check your blood sugar. Learn the symptoms of low and high blood sugar and how to manage them.  Always carry a quick-source of sugar with you in case you have symptoms of low blood sugar. Examples include hard sugar candy or glucose tablets. Make sure others know that you can choke if you eat or drink when you develop serious symptoms of low blood sugar, such as seizures or unconsciousness. They must get medical help at once.  Tell your doctor or health care professional if you have high blood sugar. You might need to change the dose of your medicine. If you are sick or exercising more than usual, you might need to change the dose of your medicine.  Do  not skip meals. Ask your doctor or health care professional if you should avoid alcohol. Many nonprescription cough and cold products contain sugar or alcohol. These can affect blood sugar.  Make sure that you have the right kind of syringe for the type of insulin you use. Try not to change the brand and type of insulin or syringe unless your health care professional or doctor tells you to. Switching insulin brand or type can cause dangerously high or low blood sugar. Always keep an extra supply of insulin, syringes, and needles on hand. Use a syringe one time only. Throw away syringe and needle in a closed container to prevent accidental needle sticks.  Insulin pens and cartridges should never be shared. Even if the needle is changed, sharing may result in passing of viruses like hepatitis or HIV.  Each time you get a new box of pen needles, check to see if they are the same type as the ones you were trained to use. If not, ask your health care professional to show you how to use this new type properly.  Wear a medical ID bracelet or chain, and carry a card that describes your disease and details of your medicine and dosage times.  What side effects may I notice from receiving this medicine?  Side effects that you should report to your doctor or health care professional as soon as possible:  · allergic reactions like skin rash, itching or hives, swelling of the face, lips, or tongue  · breathing problems  · signs and symptoms of high blood sugar such as dizziness, dry mouth, dry skin, fruity breath, nausea, stomach pain, increased hunger or thirst, increased urination  · signs and symptoms of low blood sugar such as feeling anxious, confusion, dizziness, increased hunger, unusually weak or tired, sweating, shakiness, cold, irritable, headache, blurred vision, fast heartbeat, loss of consciousness  Side effects that usually do not require medical attention (report to your doctor or health care professional if they  continue or are bothersome):  · increase or decrease in fatty tissue under the skin due to overuse of a particular injection site  · itching, burning, swelling, or rash at site where injected  This list may not describe all possible side effects. Call your doctor for medical advice about side effects. You may report side effects to FDA at 4-950-ZFR-7934.  Where should I keep my medicine?  Keep out of the reach of children.  Unopened Vials:  Levemir vials: Store in a refrigerator between 2 and 8 degrees C (36 and 46 degrees F) or at room temperature below 30 degrees C (86 degrees F). Do not freeze or use if the insulin has been frozen. Protect from light and excessive heat. If stored at room temperature, the vial must be discarded after 42 days. Throw away any unopened and unused medicine that has been stored in the refrigerator after the expiration date.  Unopened Pens:  Levemir Flextouch pens: Store in a refrigerator between 2 and 8 degrees C (36 and 46 degrees F) or at room temperature below 30 degrees C (86 degrees F). Do not freeze or use if the insulin has been frozen. Protect from light and excessive heat. If stored at room temperature, the pen must be discarded after 42 days. Throw away any unopened and unused medicine that has been stored in the refrigerator after the expiration date.  Vials that you are using:  Levemir vials: Store in the refrigerator or at room temperature below 30 degrees C (86 degrees F). Do not freeze. Keep away from heat and light. Throw the opened vial away after 42 days.  Pens that you are using:  Levemir Flextouch pens: Store at room temperature, below 30 degrees C (86 degrees F). Do not store in the refrigerator once opened. Do not refrigerate or freeze. Keep away from heat and light. Throw the pen away after 42 days, even if it still has insulin left in it.  NOTE: This sheet is a summary. It may not cover all possible information. If you have questions about this medicine, talk to  your doctor, pharmacist, or health care provider.  © 2021 [employer] Standard (2020-09-01 08:10:07)

## 2021-12-06 DIAGNOSIS — E11.9 TYPE 2 DIABETES MELLITUS WITHOUT COMPLICATION, WITH LONG-TERM CURRENT USE OF INSULIN (HCC): ICD-10-CM

## 2021-12-06 DIAGNOSIS — Z79.4 TYPE 2 DIABETES MELLITUS WITHOUT COMPLICATION, WITH LONG-TERM CURRENT USE OF INSULIN (HCC): ICD-10-CM

## 2021-12-07 RX ORDER — METFORMIN HYDROCHLORIDE 500 MG/1
1000 TABLET, EXTENDED RELEASE ORAL 2 TIMES DAILY
Qty: 120 TABLET | Refills: 0 | Status: SHIPPED | OUTPATIENT
Start: 2021-12-07 | End: 2022-01-12

## 2021-12-15 ENCOUNTER — OFFICE VISIT (OUTPATIENT)
Dept: INTERNAL MEDICINE | Facility: CLINIC | Age: 61
End: 2021-12-15

## 2021-12-15 VITALS
HEIGHT: 66 IN | HEART RATE: 89 BPM | RESPIRATION RATE: 15 BRPM | TEMPERATURE: 98.1 F | DIASTOLIC BLOOD PRESSURE: 70 MMHG | BODY MASS INDEX: 29.25 KG/M2 | OXYGEN SATURATION: 98 % | WEIGHT: 182 LBS | SYSTOLIC BLOOD PRESSURE: 130 MMHG

## 2021-12-15 DIAGNOSIS — R59.0 CERVICAL LYMPHADENOPATHY: ICD-10-CM

## 2021-12-15 DIAGNOSIS — Z00.00 HEALTHCARE MAINTENANCE: ICD-10-CM

## 2021-12-15 DIAGNOSIS — Z79.4 TYPE 2 DIABETES MELLITUS WITH HYPERGLYCEMIA, WITH LONG-TERM CURRENT USE OF INSULIN (HCC): Primary | ICD-10-CM

## 2021-12-15 DIAGNOSIS — E11.65 TYPE 2 DIABETES MELLITUS WITH HYPERGLYCEMIA, WITH LONG-TERM CURRENT USE OF INSULIN (HCC): Primary | ICD-10-CM

## 2021-12-15 LAB — GLUCOSE BLDC GLUCOMTR-MCNC: 127 MG/DL (ref 70–130)

## 2021-12-15 PROCEDURE — 99214 OFFICE O/P EST MOD 30 MIN: CPT | Performed by: INTERNAL MEDICINE

## 2021-12-15 PROCEDURE — 82962 GLUCOSE BLOOD TEST: CPT | Performed by: INTERNAL MEDICINE

## 2021-12-15 RX ORDER — INSULIN GLARGINE AND LIXISENATIDE 100; 33 U/ML; UG/ML
30 INJECTION, SOLUTION SUBCUTANEOUS 2 TIMES DAILY
COMMUNITY
Start: 2021-12-01 | End: 2022-04-14

## 2021-12-15 NOTE — PROGRESS NOTES
Chief Complaint   Patient presents with   • Follow-up   • Diabetes         History:  Soledad Ram is a 61 y.o. female who presents today for evaluation of the above problems.      She presents today for a 4-week follow-up on her diabetes.  At last visit her A1c was 11.4%.  She was unable to afford her medication.  Reviewed her formulary, and Levemir and Jardiance were on it.  However, the Levemir was too expensive.  Jardiance is well covered.  Fortunately, her [employer] family helped her get the Soliqua which she has been taking.  She is also taking Jardiance 10 mg daily, Amaryl 4 mg daily Metformin 1000 mg twice a day.    Her blood sugar is much better controlled.  I reviewed her blood sugar log and she has had very few highs, and none more than 200.  She has not had any hypoglycemic episodes either.  She is feeling better and she has been    For the last 1 month she has had swollen glands in her neck.  Denies any symptoms or recent illnesses.  She has not noticed any change in size, pain, dental issues.  Denies any previous tobacco products or current tobacco products.      HPI      ROS:  Review of Systems     As above      Current Outpatient Medications:   •  azelastine (ASTELIN) 0.1 % nasal spray, 2 sprays into the nostril(s) as directed by provider 2 (Two) Times a Day. Use in each nostril as directed, Disp: 1 each, Rfl: 12  •  cyclobenzaprine (FLEXERIL) 10 MG tablet, Take 10 mg by mouth 2 (Two) Times a Day As Needed for Muscle Spasms., Disp: , Rfl:   •  desonide (DESOWEN) 0.05 % cream, Apply 1 application topically to the appropriate area as directed 2 (Two) Times a Day., Disp: , Rfl:   •  DULoxetine (CYMBALTA) 60 MG capsule, Take 120 mg by mouth. Takes 2 capsules daily, Disp: , Rfl:   •  empagliflozin (Jardiance) 10 MG tablet tablet, Take 1 tablet by mouth Daily., Disp: 30 tablet, Rfl: 0  •  fluticasone (Flonase) 50 MCG/ACT nasal spray, 2 sprays into the nostril(s) as directed by provider Daily., Disp: 16 g,  Rfl: 0  •  gabapentin (NEURONTIN) 600 MG tablet, Take 600 mg by mouth Every 8 (Eight) Hours As Needed., Disp: , Rfl:   •  glimepiride (Amaryl) 4 MG tablet, Take 1 tablet by mouth Every Morning Before Breakfast., Disp: 30 tablet, Rfl: 5  •  losartan (COZAAR) 50 MG tablet, Take 1 tablet by mouth once daily, Disp: 90 tablet, Rfl: 3  •  metFORMIN ER (GLUCOPHAGE-XR) 500 MG 24 hr tablet, Take 2 tablets by mouth 2 (Two) Times a Day., Disp: 120 tablet, Rfl: 0  •  NON FORMULARY, [place] pain cream, Disp: , Rfl:   •  oxyCODONE-acetaminophen (PERCOCET)  MG per tablet, Take 1 tablet by mouth Every 6 (Six) Hours As Needed for Moderate Pain ., Disp: , Rfl:   •  polyethylene glycol (MIRALAX) 17 g packet, Take 17 g by mouth Daily., Disp: 30 packet, Rfl: 5  •  Rhubarb (ESTROVEN MENOPAUSE RELIEF PO), Take  by mouth., Disp: , Rfl:   •  rosuvastatin (CRESTOR) 20 MG tablet, Take 1 tablet by mouth once daily, Disp: 90 tablet, Rfl: 3  •  Soliqua 100-33 UNT-MCG/ML solution pen-injector injection, Inject 30 Units under the skin into the appropriate area as directed 2 (Two) Times a Day., Disp: , Rfl:   •  traZODone (DESYREL) 50 MG tablet, Take 3 tablets by mouth Every Night., Disp: 90 tablet, Rfl: 5    Lab Results   Component Value Date    GLUCOSE 107 (H) 11/09/2020    BUN 15 11/09/2020    CREATININE 0.71 11/09/2020    EGFRIFNONA 85 11/09/2020    BCR 21.1 11/09/2020    K 3.9 11/09/2020    CO2 24.0 11/09/2020    CALCIUM 9.9 11/09/2020    ALBUMIN 4.30 11/09/2020    AST 30 11/09/2020    ALT 26 11/09/2020       WBC   Date Value Ref Range Status   02/05/2020 9.98 3.40 - 10.80 10*3/mm3 Final     RBC   Date Value Ref Range Status   02/05/2020 4.79 3.77 - 5.28 10*6/mm3 Final     Hemoglobin   Date Value Ref Range Status   02/05/2020 14.3 12.0 - 15.9 g/dL Final     Hematocrit   Date Value Ref Range Status   02/05/2020 41.6 34.0 - 46.6 % Final     MCV   Date Value Ref Range Status   02/05/2020 86.8 79.0 - 97.0 fL Final     MCH   Date Value Ref  "Range Status   02/05/2020 29.9 26.6 - 33.0 pg Final     MCHC   Date Value Ref Range Status   02/05/2020 34.4 31.5 - 35.7 g/dL Final     RDW   Date Value Ref Range Status   02/05/2020 13.3 12.3 - 15.4 % Final     RDW-SD   Date Value Ref Range Status   02/05/2020 42.2 37.0 - 54.0 fl Final     MPV   Date Value Ref Range Status   02/05/2020 10.6 6.0 - 12.0 fL Final     Platelets   Date Value Ref Range Status   02/05/2020 244 140 - 450 10*3/mm3 Final     Neutrophil %   Date Value Ref Range Status   02/05/2020 42.9 42.7 - 76.0 % Final     Lymphocyte %   Date Value Ref Range Status   02/05/2020 36.7 19.6 - 45.3 % Final     Monocyte %   Date Value Ref Range Status   02/05/2020 10.9 5.0 - 12.0 % Final     Eosinophil %   Date Value Ref Range Status   02/05/2020 8.3 (H) 0.3 - 6.2 % Final     Basophil %   Date Value Ref Range Status   02/05/2020 0.8 0.0 - 1.5 % Final     Immature Grans %   Date Value Ref Range Status   02/05/2020 0.4 0.0 - 0.5 % Final     Neutrophils, Absolute   Date Value Ref Range Status   02/05/2020 4.28 1.70 - 7.00 10*3/mm3 Final     Lymphocytes, Absolute   Date Value Ref Range Status   02/05/2020 3.66 (H) 0.70 - 3.10 10*3/mm3 Final     Monocytes, Absolute   Date Value Ref Range Status   02/05/2020 1.09 (H) 0.10 - 0.90 10*3/mm3 Final     Eosinophils, Absolute   Date Value Ref Range Status   02/05/2020 0.83 (H) 0.00 - 0.40 10*3/mm3 Final     Basophils, Absolute   Date Value Ref Range Status   02/05/2020 0.08 0.00 - 0.20 10*3/mm3 Final     Immature Grans, Absolute   Date Value Ref Range Status   02/05/2020 0.04 0.00 - 0.05 10*3/mm3 Final     nRBC   Date Value Ref Range Status   02/05/2020 0.0 0.0 - 0.2 /100 WBC Final         OBJECTIVE:  Visit Vitals  /70 (BP Location: Left arm, Patient Position: Sitting, Cuff Size: Adult)   Pulse 89   Temp 98.1 °F (36.7 °C) (Oral)   Resp 15   Ht 167.6 cm (65.98\")   Wt 82.6 kg (182 lb)   SpO2 98%   BMI 29.39 kg/m²      Physical Exam  Constitutional:       General: She is " not in acute distress.     Appearance: She is well-developed. She is not diaphoretic.   HENT:      Head: Normocephalic and atraumatic.   Eyes:      Pupils: Pupils are equal, round, and reactive to light.   Neck:      Thyroid: No thyromegaly.      Trachea: Phonation normal.   Pulmonary:      Effort: No respiratory distress.   Lymphadenopathy:      Cervical: Cervical adenopathy (Bilateral, left greater than right.  Firm and immobile) present.   Skin:     Coloration: Skin is not pale.      Findings: No erythema.   Neurological:      Mental Status: She is alert.   Psychiatric:         Behavior: Behavior normal.         Thought Content: Thought content normal.         Judgment: Judgment normal.         Assessment/Plan    Diagnoses and all orders for this visit:    1. Type 2 diabetes mellitus with hyperglycemia, with long-term current use of insulin (HCC) (Primary)  -     POCT Glucose  -     Hemoglobin A1c; Future  -     Microalbumin / Creatinine Urine Ratio - Urine, Clean Catch; Future    2. Cervical lymphadenopathy  -     US Head Neck Soft Tissue; Future    3. Healthcare maintenance  -     CBC & Differential; Future  -     Comprehensive Metabolic Panel; Future  -     Hemoglobin A1c; Future  -     Lipid Panel; Future      Prediabetes is much better controlled.  I would continue her current regimen.  She is going to be getting new insurance at the beginning of the year and her Soliqua will be covered.  Jardiance is well covered as well and I do not think we need to adjust this dose.  Her fasting glucose today was 127.  Recheck A1c couple days prior to the next visit.  Recheck the other blood work above a couple days before the visit as well    She has cervical lymphadenopathy of unclear etiology.  We will get ultrasound for further evaluation.  No signs/symptoms of infection.    Follow-up in about 9 weeks for Medicare wellness visit, with labs a couple days prior.      Return in about 9 weeks (around 2/16/2022) for   Medicare Wellness and A1c.      D. Navjot Sarabia MD  08:25 CST  12/15/2021

## 2021-12-27 RX ORDER — TRAZODONE HYDROCHLORIDE 50 MG/1
TABLET ORAL
Qty: 90 TABLET | Refills: 5 | Status: SHIPPED | OUTPATIENT
Start: 2021-12-27 | End: 2022-07-11

## 2022-01-12 DIAGNOSIS — Z79.4 TYPE 2 DIABETES MELLITUS WITHOUT COMPLICATION, WITH LONG-TERM CURRENT USE OF INSULIN: ICD-10-CM

## 2022-01-12 DIAGNOSIS — E11.9 TYPE 2 DIABETES MELLITUS WITHOUT COMPLICATION, WITH LONG-TERM CURRENT USE OF INSULIN: ICD-10-CM

## 2022-01-12 RX ORDER — METFORMIN HYDROCHLORIDE 500 MG/1
TABLET, EXTENDED RELEASE ORAL
Qty: 120 TABLET | Refills: 5 | Status: SHIPPED | OUTPATIENT
Start: 2022-01-12 | End: 2022-06-27 | Stop reason: SDUPTHER

## 2022-02-07 DIAGNOSIS — E11.9 TYPE 2 DIABETES MELLITUS WITHOUT COMPLICATION, WITH LONG-TERM CURRENT USE OF INSULIN: ICD-10-CM

## 2022-02-07 DIAGNOSIS — Z79.4 TYPE 2 DIABETES MELLITUS WITHOUT COMPLICATION, WITH LONG-TERM CURRENT USE OF INSULIN: ICD-10-CM

## 2022-02-08 RX ORDER — EMPAGLIFLOZIN 10 MG/1
TABLET, FILM COATED ORAL
Qty: 30 TABLET | Refills: 5 | Status: SHIPPED | OUTPATIENT
Start: 2022-02-08 | End: 2022-02-24 | Stop reason: DRUGHIGH

## 2022-02-11 ENCOUNTER — TELEPHONE (OUTPATIENT)
Dept: INTERNAL MEDICINE | Facility: CLINIC | Age: 62
End: 2022-02-11

## 2022-02-11 DIAGNOSIS — J06.9 VIRAL URI: Primary | ICD-10-CM

## 2022-02-11 NOTE — TELEPHONE ENCOUNTER
COVID SCHEDULING HAS BEEN CALLED, THEY WILL CALL PATIENT TO SCHEDULE    PATIENT HAS BEEN CALLED, GIVEN MESSAGE AND UNDERSTANDING VOICED.

## 2022-02-11 NOTE — TELEPHONE ENCOUNTER
PATIENT HAS BEEN CALLED, SHE STATED THAT SHE HAS HAD THESE SYMPTOMS ON AND OFF FOR 2 MONTHS.   SHE STATED THAT SHE HAS BEEN USING NASAL SPRAY BUT ITS NOT GETTING ANY BETTER.   PATIENT STATED THAT THE DIZZINESS 2 DAYS AGO WAS SO BAD THAT SHE COULD HARDLY STAND UP.  SHE DID STATE THAT IT IS SOME BETTER TODAY.    PATIENT IS OK TO DO A COVID TEST IF RECOMMENDED.

## 2022-02-11 NOTE — TELEPHONE ENCOUNTER
Caller: Soledad Ram    Relationship: Self    Best call back number: [phone] (H)     What medication are you requesting: unsure, antibiotics and something for dizziness     What are your current symptoms: headache, sinus pressure, congestion, green and red nasal discharge, dizziness     How long have you been experiencing symptoms: a few days       If a prescription is needed, what is your preferred pharmacy and phone number:    [address] - [address] [facility] - [phone] [facility] [phone]   [phone]    Additional notes:  Pt said that if needed, she is okay with taking a covid test ; no known exposure.

## 2022-02-12 ENCOUNTER — LAB (OUTPATIENT)
Dept: LAB | Facility: HOSPITAL | Age: 62
End: 2022-02-12

## 2022-02-12 DIAGNOSIS — J06.9 VIRAL URI: ICD-10-CM

## 2022-02-12 LAB — SARS-COV-2 ORF1AB RESP QL NAA+PROBE: NOT DETECTED

## 2022-02-12 PROCEDURE — U0004 COV-19 TEST NON-CDC HGH THRU: HCPCS

## 2022-02-12 PROCEDURE — C9803 HOPD COVID-19 SPEC COLLECT: HCPCS

## 2022-02-15 ENCOUNTER — HOSPITAL ENCOUNTER (OUTPATIENT)
Dept: GENERAL RADIOLOGY | Facility: HOSPITAL | Age: 62
Discharge: HOME | End: 2022-02-15

## 2022-02-15 ENCOUNTER — TRANSCRIBE ORDERS (OUTPATIENT)
Dept: ADMINISTRATIVE | Facility: HOSPITAL | Age: 62
End: 2022-02-15

## 2022-02-15 DIAGNOSIS — M54.16 LUMBAR RADICULOPATHY: ICD-10-CM

## 2022-02-15 DIAGNOSIS — M54.16 LUMBAR RADICULOPATHY: Primary | ICD-10-CM

## 2022-02-15 PROCEDURE — 72110 X-RAY EXAM L-2 SPINE 4/>VWS: CPT

## 2022-02-23 ENCOUNTER — OFFICE VISIT (OUTPATIENT)
Dept: INTERNAL MEDICINE | Facility: CLINIC | Age: 62
End: 2022-02-23

## 2022-02-23 ENCOUNTER — LAB (OUTPATIENT)
Dept: LAB | Facility: HOSPITAL | Age: 62
End: 2022-02-23

## 2022-02-23 VITALS
HEIGHT: 66 IN | HEART RATE: 98 BPM | RESPIRATION RATE: 16 BRPM | SYSTOLIC BLOOD PRESSURE: 125 MMHG | TEMPERATURE: 98.5 F | BODY MASS INDEX: 29.57 KG/M2 | OXYGEN SATURATION: 98 % | DIASTOLIC BLOOD PRESSURE: 70 MMHG | WEIGHT: 184 LBS

## 2022-02-23 DIAGNOSIS — Z79.4 TYPE 2 DIABETES MELLITUS WITHOUT COMPLICATION, WITH LONG-TERM CURRENT USE OF INSULIN: ICD-10-CM

## 2022-02-23 DIAGNOSIS — Z79.4 TYPE 2 DIABETES MELLITUS WITH HYPERGLYCEMIA, WITH LONG-TERM CURRENT USE OF INSULIN: ICD-10-CM

## 2022-02-23 DIAGNOSIS — Z00.00 HEALTHCARE MAINTENANCE: ICD-10-CM

## 2022-02-23 DIAGNOSIS — E11.65 TYPE 2 DIABETES MELLITUS WITH HYPERGLYCEMIA, WITH LONG-TERM CURRENT USE OF INSULIN: ICD-10-CM

## 2022-02-23 DIAGNOSIS — Z13.31 DEPRESSION SCREEN: ICD-10-CM

## 2022-02-23 DIAGNOSIS — Z00.00 MEDICARE ANNUAL WELLNESS VISIT, SUBSEQUENT: Primary | ICD-10-CM

## 2022-02-23 DIAGNOSIS — E11.9 TYPE 2 DIABETES MELLITUS WITHOUT COMPLICATION, WITH LONG-TERM CURRENT USE OF INSULIN: ICD-10-CM

## 2022-02-23 DIAGNOSIS — Z12.11 COLON CANCER SCREENING: ICD-10-CM

## 2022-02-23 DIAGNOSIS — K59.01 SLOW TRANSIT CONSTIPATION: ICD-10-CM

## 2022-02-23 LAB
ALBUMIN SERPL-MCNC: 4.5 G/DL (ref 3.5–5.2)
ALBUMIN UR-MCNC: <1.2 MG/DL
ALBUMIN/GLOB SERPL: 1.8 G/DL
ALP SERPL-CCNC: 109 U/L (ref 39–117)
ALT SERPL W P-5'-P-CCNC: 30 U/L (ref 1–33)
ANION GAP SERPL CALCULATED.3IONS-SCNC: 12 MMOL/L (ref 5–15)
AST SERPL-CCNC: 48 U/L (ref 1–32)
BASOPHILS # BLD AUTO: 0.12 10*3/MM3 (ref 0–0.2)
BASOPHILS NFR BLD AUTO: 1.2 % (ref 0–1.5)
BILIRUB SERPL-MCNC: 0.3 MG/DL (ref 0–1.2)
BUN SERPL-MCNC: 12 MG/DL (ref 8–23)
BUN/CREAT SERPL: 15.8 (ref 7–25)
CALCIUM SPEC-SCNC: 9.8 MG/DL (ref 8.6–10.5)
CHLORIDE SERPL-SCNC: 106 MMOL/L (ref 98–107)
CHOLEST SERPL-MCNC: 129 MG/DL (ref 0–200)
CO2 SERPL-SCNC: 27 MMOL/L (ref 22–29)
CREAT SERPL-MCNC: 0.76 MG/DL (ref 0.57–1)
CREAT UR-MCNC: 39.7 MG/DL
DEPRECATED RDW RBC AUTO: 45.3 FL (ref 37–54)
EOSINOPHIL # BLD AUTO: 1.05 10*3/MM3 (ref 0–0.4)
EOSINOPHIL NFR BLD AUTO: 10.4 % (ref 0.3–6.2)
ERYTHROCYTE [DISTWIDTH] IN BLOOD BY AUTOMATED COUNT: 13.2 % (ref 12.3–15.4)
EXPIRATION DATE: ABNORMAL
GFR SERPL CREATININE-BSD FRML MDRD: 78 ML/MIN/1.73
GLOBULIN UR ELPH-MCNC: 2.5 GM/DL
GLUCOSE SERPL-MCNC: 211 MG/DL (ref 65–99)
HBA1C MFR BLD: 10.1 % (ref 4.8–5.6)
HBA1C MFR BLD: 10.3 %
HCT VFR BLD AUTO: 47.1 % (ref 34–46.6)
HDLC SERPL-MCNC: 43 MG/DL (ref 40–60)
HGB BLD-MCNC: 15.3 G/DL (ref 12–15.9)
IMM GRANULOCYTES # BLD AUTO: 0.03 10*3/MM3 (ref 0–0.05)
IMM GRANULOCYTES NFR BLD AUTO: 0.3 % (ref 0–0.5)
LDLC SERPL CALC-MCNC: 50 MG/DL (ref 0–100)
LDLC/HDLC SERPL: 0.93 {RATIO}
LYMPHOCYTES # BLD AUTO: 2.78 10*3/MM3 (ref 0.7–3.1)
LYMPHOCYTES NFR BLD AUTO: 27.5 % (ref 19.6–45.3)
Lab: ABNORMAL
MCH RBC QN AUTO: 30.1 PG (ref 26.6–33)
MCHC RBC AUTO-ENTMCNC: 32.5 G/DL (ref 31.5–35.7)
MCV RBC AUTO: 92.7 FL (ref 79–97)
MICROALBUMIN/CREAT UR: NORMAL MG/G{CREAT}
MONOCYTES # BLD AUTO: 0.81 10*3/MM3 (ref 0.1–0.9)
MONOCYTES NFR BLD AUTO: 8 % (ref 5–12)
NEUTROPHILS NFR BLD AUTO: 5.31 10*3/MM3 (ref 1.7–7)
NEUTROPHILS NFR BLD AUTO: 52.6 % (ref 42.7–76)
NRBC BLD AUTO-RTO: 0 /100 WBC (ref 0–0.2)
PLATELET # BLD AUTO: 249 10*3/MM3 (ref 140–450)
PMV BLD AUTO: 11.6 FL (ref 6–12)
POTASSIUM SERPL-SCNC: 4.4 MMOL/L (ref 3.5–5.2)
PROT SERPL-MCNC: 7 G/DL (ref 6–8.5)
RBC # BLD AUTO: 5.08 10*6/MM3 (ref 3.77–5.28)
SODIUM SERPL-SCNC: 145 MMOL/L (ref 136–145)
TRIGL SERPL-MCNC: 230 MG/DL (ref 0–150)
VLDLC SERPL-MCNC: 36 MG/DL (ref 5–40)
WBC NRBC COR # BLD: 10.1 10*3/MM3 (ref 3.4–10.8)

## 2022-02-23 PROCEDURE — 82570 ASSAY OF URINE CREATININE: CPT

## 2022-02-23 PROCEDURE — 80053 COMPREHEN METABOLIC PANEL: CPT

## 2022-02-23 PROCEDURE — 83036 HEMOGLOBIN GLYCOSYLATED A1C: CPT | Performed by: INTERNAL MEDICINE

## 2022-02-23 PROCEDURE — 80061 LIPID PANEL: CPT

## 2022-02-23 PROCEDURE — 83036 HEMOGLOBIN GLYCOSYLATED A1C: CPT

## 2022-02-23 PROCEDURE — 1170F FXNL STATUS ASSESSED: CPT | Performed by: INTERNAL MEDICINE

## 2022-02-23 PROCEDURE — 85025 COMPLETE CBC W/AUTO DIFF WBC: CPT

## 2022-02-23 PROCEDURE — 1126F AMNT PAIN NOTED NONE PRSNT: CPT | Performed by: INTERNAL MEDICINE

## 2022-02-23 PROCEDURE — 3046F HEMOGLOBIN A1C LEVEL >9.0%: CPT | Performed by: INTERNAL MEDICINE

## 2022-02-23 PROCEDURE — G0439 PPPS, SUBSEQ VISIT: HCPCS | Performed by: INTERNAL MEDICINE

## 2022-02-23 PROCEDURE — 1159F MED LIST DOCD IN RCRD: CPT | Performed by: INTERNAL MEDICINE

## 2022-02-23 PROCEDURE — 36415 COLL VENOUS BLD VENIPUNCTURE: CPT

## 2022-02-23 PROCEDURE — 96160 PT-FOCUSED HLTH RISK ASSMT: CPT | Performed by: INTERNAL MEDICINE

## 2022-02-23 PROCEDURE — 82043 UR ALBUMIN QUANTITATIVE: CPT

## 2022-02-23 RX ORDER — POLYETHYLENE GLYCOL 3350 17 G/17G
17 POWDER, FOR SOLUTION ORAL DAILY
Qty: 30 PACKET | Refills: 5 | Status: SHIPPED | OUTPATIENT
Start: 2022-02-23

## 2022-02-23 RX ORDER — GLIMEPIRIDE 4 MG/1
4 TABLET ORAL
Qty: 30 TABLET | Refills: 5 | Status: SHIPPED | OUTPATIENT
Start: 2022-02-23 | End: 2022-10-27 | Stop reason: SDUPTHER

## 2022-02-23 RX ORDER — NALDEMEDINE 0.2 MG/1
1 TABLET ORAL DAILY PRN
COMMUNITY
Start: 2022-02-14 | End: 2023-03-01

## 2022-02-23 NOTE — PROGRESS NOTES
The ABCs of the Annual Wellness Visit  Subsequent Medicare Wellness Visit    Chief Complaint   Patient presents with   • Medicare Wellness-subsequent      Subjective    History of Present Illness:  Soledad Ram is a 62 y.o. female who presents for a Subsequent Medicare Wellness Visit.  She reports that her blood sugar has been good.    She has been upset related to stress from her brother.  He may need to go back to prison.  Currently, he is in care home apparently for being a repeat felony offender.  He has been reliant on Soledad for everything and this is caused a lot of stress.  Is put stress on her relationship with her .  She is on the Cymbalta 120 mg daily.  She is going to talk with her  tonight    She is losing weight with diet and exercise.    The following portions of the patient's history were reviewed and   updated as appropriate: allergies, current medications, past family history, past medical history, past social history, past surgical history and problem list.    Compared to one year ago, the patient feels her physical   health is better.    Compared to one year ago, the patient feels her mental   health is worse.    Recent Hospitalizations:  She was not admitted to the hospital during the last year.       Current Medical Providers:  Patient Care Team:  KIRTI Sarabia MD as PCP - General (Internal Medicine)    Outpatient Medications Prior to Visit   Medication Sig Dispense Refill   • azelastine (ASTELIN) 0.1 % nasal spray 2 sprays into the nostril(s) as directed by provider 2 (Two) Times a Day. Use in each nostril as directed 1 each 12   • cyclobenzaprine (FLEXERIL) 10 MG tablet Take 10 mg by mouth 2 (Two) Times a Day As Needed for Muscle Spasms.     • desonide (DESOWEN) 0.05 % cream Apply 1 application topically to the appropriate area as directed 2 (Two) Times a Day.     • DULoxetine (CYMBALTA) 60 MG capsule Take 120 mg by mouth. Takes 2 capsules daily     • fluticasone (Flonase) 50  MCG/ACT nasal spray 2 sprays into the nostril(s) as directed by provider Daily. 16 g 0   • gabapentin (NEURONTIN) 600 MG tablet Take 600 mg by mouth Every 8 (Eight) Hours As Needed.     • Jardiance 10 MG tablet tablet Take 1 tablet by mouth once daily 30 tablet 5   • losartan (COZAAR) 50 MG tablet Take 1 tablet by mouth once daily 90 tablet 3   • metFORMIN ER (GLUCOPHAGE-XR) 500 MG 24 hr tablet Take 2 tablets by mouth twice daily 120 tablet 5   • NON FORMULARY [facility] pain cream     • oxyCODONE-acetaminophen (PERCOCET)  MG per tablet Take 1 tablet by mouth Every 6 (Six) Hours As Needed for Moderate Pain .     • rosuvastatin (CRESTOR) 20 MG tablet Take 1 tablet by mouth once daily 90 tablet 3   • Soliqua 100-33 UNT-MCG/ML solution pen-injector injection Inject 30 Units under the skin into the appropriate area as directed 2 (Two) Times a Day.     • Symproic 0.2 MG tablet Take 1 tablet by mouth Daily As Needed.     • traZODone (DESYREL) 50 MG tablet TAKE 3 TABLETS BY MOUTH ONCE DAILY AT NIGHT 90 tablet 5   • glimepiride (Amaryl) 4 MG tablet Take 1 tablet by mouth Every Morning Before Breakfast. 30 tablet 5   • polyethylene glycol (MIRALAX) 17 g packet Take 17 g by mouth Daily. 30 packet 5   • Rhubarb (ESTROVEN MENOPAUSE RELIEF PO) Take  by mouth.       No facility-administered medications prior to visit.       Opioid medication/s are on active medication list.  and I have evaluated her active treatment plan and pain score trends (see table).  There were no vitals filed for this visit.  I have reviewed the chart for potential of high risk medication and harmful drug interactions in the elderly.            Aspirin is not on active medication list.  Aspirin use is not indicated based on review of current medical condition/s. Risk of harm outweighs potential benefits.  .  The ASCVD Risk score (Dana ISAEL Jr., et al., 2013) failed to calculate for the following reasons:    The valid total cholesterol range is 130 to  "320 mg/dL      Patient Active Problem List   Diagnosis   • Obesity (BMI 30.0-34.9)   • Hyperlipidemia   • Essential hypertension   • Type 2 diabetes mellitus with hyperglycemia, with long-term current use of insulin (HCC)   • Chronic left-sided low back pain with left-sided sciatica   • Chronic constipation   • DDD (degenerative disc disease), lumbar   • Depressive disorder   • History of colonic polyps   • Hypoalphalipoproteinemia   • Hypomagnesemia   • Insomnia   • Leukocytosis   • Lumbar radiculopathy   • Microalbuminuria   • Neuropathy   • Other spondylosis with radiculopathy, lumbar region   • Postmenopausal state   • Encounter for long-term (current) use of insulin (HCC)   • Stricture of esophagus     Advance Care Planning  Advance Directive is not on file.  ACP discussion was held with the patient during this visit. Patient does not have an advance directive, information provided.    Review of Systems   Constitutional: Negative.    HENT: Negative.    Respiratory: Negative.    Cardiovascular: Negative.    Gastrointestinal: Positive for constipation.   Genitourinary: Negative.    Neurological: Negative.    Psychiatric/Behavioral: The patient is nervous/anxious.         Depression           Objective    Vitals:    02/23/22 1001   BP: 125/70   BP Location: Left arm   Patient Position: Sitting   Cuff Size: Adult   Pulse: 98   Resp: 16   Temp: 98.5 °F (36.9 °C)   TempSrc: Oral   SpO2: 98%   Weight: 83.5 kg (184 lb)   Height: 167.6 cm (65.98\")     BMI Readings from Last 1 Encounters:   02/23/22 29.71 kg/m²   BMI is above normal parameters. Recommendations include: educational material and exercise counseling    Does the patient have evidence of cognitive impairment? No    Physical Exam  Vitals reviewed.   Constitutional:       General: She is not in acute distress.     Appearance: Normal appearance. She is well-developed. She is not diaphoretic.   HENT:      Head: Normocephalic and atraumatic.      Right Ear: " Tympanic membrane, ear canal and external ear normal. There is no impacted cerumen.      Left Ear: Tympanic membrane, ear canal and external ear normal. There is no impacted cerumen.      Mouth/Throat:      Pharynx: No oropharyngeal exudate.   Eyes:      General: No scleral icterus.     Conjunctiva/sclera: Conjunctivae normal.      Pupils: Pupils are equal, round, and reactive to light.   Neck:      Thyroid: No thyromegaly.      Vascular: No carotid bruit or JVD.      Trachea: Phonation normal.   Cardiovascular:      Rate and Rhythm: Normal rate and regular rhythm.      Heart sounds: Normal heart sounds. No murmur heard.  No friction rub. No gallop.    Pulmonary:      Effort: Pulmonary effort is normal. No respiratory distress.      Breath sounds: Normal breath sounds. No stridor. No wheezing, rhonchi or rales.   Abdominal:      General: Bowel sounds are normal. There is no distension.      Palpations: Abdomen is soft.      Tenderness: There is no abdominal tenderness.   Musculoskeletal:      Right lower leg: No edema.      Left lower leg: No edema.   Skin:     General: Skin is warm and dry.      Coloration: Skin is not jaundiced or pale.      Findings: No erythema.   Neurological:      Mental Status: She is alert.      GCS: GCS eye subscore is 4. GCS verbal subscore is 5. GCS motor subscore is 6.      Cranial Nerves: No cranial nerve deficit.      Deep Tendon Reflexes:      Reflex Scores:       Patellar reflexes are 1+ on the right side and 1+ on the left side.  Psychiatric:         Mood and Affect: Mood normal. Affect is tearful (at times).         Behavior: Behavior normal.         Thought Content: Thought content normal.         Judgment: Judgment normal.       Lab Results   Component Value Date    BA1C 10.3 02/23/2022            HEALTH RISK ASSESSMENT    Smoking Status:  Social History     Tobacco Use   Smoking Status Never Smoker   Smokeless Tobacco Never Used     Alcohol Consumption:  Social History      Substance and Sexual Activity   Alcohol Use No     Fall Risk Screen:    STEADI Fall Risk Assessment was completed, and patient is at MODERATE risk for falls. Assessment completed on:2/23/2022    Depression Screening:  PHQ-2/PHQ-9 Depression Screening 2/23/2022   Little interest or pleasure in doing things 3   Feeling down, depressed, or hopeless 2   Trouble falling or staying asleep, or sleeping too much 2   Feeling tired or having little energy 3   Poor appetite or overeating 2   Feeling bad about yourself - or that you are a failure or have let yourself or your family down 2   Trouble concentrating on things, such as reading the newspaper or watching television 3   Moving or speaking so slowly that other people could have noticed. Or the opposite - being so fidgety or restless that you have been moving around a lot more than usual 0   Thoughts that you would be better off dead, or of hurting yourself in some way 0   Total Score 17   If you checked off any problems, how difficult have these problems made it for you to do your work, take care of things at home, or get along with other people? Very difficult       Health Habits and Functional and Cognitive Screening:  Functional & Cognitive Status 2/23/2022   Do you have difficulty preparing food and eating? No   Do you have difficulty bathing yourself, getting dressed or grooming yourself? No   Do you have difficulty using the toilet? No   Do you have difficulty moving around from place to place? No   Do you have trouble with steps or getting out of a bed or a chair? No   Current Diet Well Balanced Diet   Dental Exam Up to date   Eye Exam Up to date   Exercise (times per week) 3 times per week   Current Exercises Include Walking;Gardening   Do you need help using the phone?  No   Are you deaf or do you have serious difficulty hearing?  No   Do you need help with transportation? No   Do you need help shopping? No   Do you need help preparing meals?  No   Do you  need help with housework?  No   Do you need help with laundry? No   Do you need help taking your medications? No   Do you need help managing money? No   Do you ever drive or ride in a car without wearing a seat belt? No   Have you felt unusual stress, anger or loneliness in the last month? Yes   Who do you live with? Spouse   If you need help, do you have trouble finding someone available to you? No   Have you been bothered in the last four weeks by sexual problems? No   Do you have difficulty concentrating, remembering or making decisions? No       Age-appropriate Screening Schedule:  Refer to the list below for future screening recommendations based on patient's age, sex and/or medical conditions. Orders for these recommended tests are listed in the plan section. The patient has been provided with a written plan.    Health Maintenance   Topic Date Due   • DIABETIC FOOT EXAM  Never done   • DIABETIC EYE EXAM  01/31/2021   • LIPID PANEL  11/09/2021   • URINE MICROALBUMIN  11/09/2021   • HEMOGLOBIN A1C  08/23/2022   • MAMMOGRAM  01/14/2023   • PAP SMEAR  01/04/2024   • TDAP/TD VACCINES (3 - Td or Tdap) 02/20/2030   • INFLUENZA VACCINE  Completed   • ZOSTER VACCINE  Completed              Assessment/Plan   CMS Preventative Services Quick Reference  Risk Factors Identified During Encounter  Depression/Dysphoria  Obesity/Overweight   The above risks/problems have been discussed with the patient.  Follow up actions/plans if indicated are seen below in the Assessment/Plan Section.  Pertinent information has been shared with the patient in the After Visit Summary.    Diagnoses and all orders for this visit:    1. Medicare annual wellness visit, subsequent (Primary)    2. Type 2 diabetes mellitus without complication, with long-term current use of insulin (HCC)  -     POC Glycosylated Hemoglobin (Hb A1C)  -     glimepiride (Amaryl) 4 MG tablet; Take 1 tablet by mouth Every Morning Before Breakfast.  Dispense: 30 tablet;  Refill: 5    3. Slow transit constipation  -     polyethylene glycol (MIRALAX) 17 g packet; Take 17 g by mouth Daily.  Dispense: 30 packet; Refill: 5    4. Depression screen    5. Type 2 diabetes mellitus with hyperglycemia, with long-term current use of insulin (HCC)    6. Colon cancer screening  -     Ambulatory Referral to Gastroenterology      I would like to get the above blood work today.  Further changes on her medication based on the results.  We may need to adjust her antihyperglycemic medications if her A1c is still uncontrolled.  Her depression screen was positive with PHQ-9 of 17.  This mostly stems from stress related to her brother and her friend recently passing away.  She is already on Cymbalta 120 mg daily and I would not make any changes in this.  She does not feel like medication changes are needed.  She is planning on talking with her  later this evening to help her through this time as well.    She is due for colorectal cancer screening I am sending referral to gastroenterology    Follow-up 4 months with repeat A1c in the office.  Follow-up sooner if needed.    Follow Up:   Return in about 4 months (around 6/23/2022) for Recheck A1c.     An After Visit Summary and PPPS were made available to the patient.

## 2022-02-24 DIAGNOSIS — E11.9 TYPE 2 DIABETES MELLITUS WITHOUT COMPLICATION, WITH LONG-TERM CURRENT USE OF INSULIN: ICD-10-CM

## 2022-02-24 DIAGNOSIS — Z79.4 TYPE 2 DIABETES MELLITUS WITHOUT COMPLICATION, WITH LONG-TERM CURRENT USE OF INSULIN: ICD-10-CM

## 2022-03-16 ENCOUNTER — TRANSCRIBE ORDERS (OUTPATIENT)
Dept: ADMINISTRATIVE | Facility: HOSPITAL | Age: 62
End: 2022-03-16

## 2022-03-16 DIAGNOSIS — Z12.31 ENCOUNTER FOR SCREENING MAMMOGRAM FOR MALIGNANT NEOPLASM OF BREAST: Primary | ICD-10-CM

## 2022-03-23 NOTE — PROGRESS NOTES
Chief Complaint   Patient presents with   • Colonoscopy     Had colon at [facility] 3 -4 years ago is having constipation       PCP: KIRTI Sarabia MD  REFER: KIRTI Sarabia MD    Subjective     HPI    Soledad Ram is a 62 y.o. female who presents to office for preventative maintenance.  There is not  a personal history of colon polyps.  There is not a history of colon cancer.  She does not have complaints of nausea/vomiting, , weight loss, no BRBPR, no melena.  There is not a family history of colon cancer in first degree relative.  There is not a family history of colon polyps in first degree relative.  Her last colonoscopy-apx 3-4 years ago in [place] .  Bowels do not move on regular basis.  She reports worsening constipation.  Bowels moving apx once per week.  Started using pain medication.  Reports recent increase in stress.       Past Medical History:   Diagnosis Date   • Diabetes mellitus (HCC)    • Infection      Past Surgical History:   Procedure Laterality Date   • CARPAL TUNNEL RELEASE     • TONSILLECTOMY     • TUBAL ABDOMINAL LIGATION       Outpatient Medications Marked as Taking for the 3/28/22 encounter (Office Visit) with Epifanio Matamoros APRN   Medication Sig Dispense Refill   • azelastine (ASTELIN) 0.1 % nasal spray 2 sprays into the nostril(s) as directed by provider 2 (Two) Times a Day. Use in each nostril as directed 1 each 12   • cyclobenzaprine (FLEXERIL) 10 MG tablet Take 10 mg by mouth 2 (Two) Times a Day As Needed for Muscle Spasms.     • desonide (DESOWEN) 0.05 % cream Apply 1 application topically to the appropriate area as directed 2 (Two) Times a Day.     • DULoxetine (CYMBALTA) 60 MG capsule Take 120 mg by mouth. Takes 2 capsules daily     • empagliflozin (Jardiance) 25 MG tablet tablet Take 1 tablet by mouth Daily. 30 tablet 5   • fluticasone (Flonase) 50 MCG/ACT nasal spray 2 sprays into the nostril(s) as directed by provider Daily. 16 g 0   • gabapentin (NEURONTIN) 600 MG  tablet Take 600 mg by mouth Every 8 (Eight) Hours As Needed.     • glimepiride (Amaryl) 4 MG tablet Take 1 tablet by mouth Every Morning Before Breakfast. 30 tablet 5   • losartan (COZAAR) 50 MG tablet Take 1 tablet by mouth once daily 90 tablet 3   • metFORMIN ER (GLUCOPHAGE-XR) 500 MG 24 hr tablet Take 2 tablets by mouth twice daily 120 tablet 5   • NON FORMULARY [facility] pain cream     • oxyCODONE-acetaminophen (PERCOCET)  MG per tablet Take 1 tablet by mouth Every 6 (Six) Hours As Needed for Moderate Pain .     • polyethylene glycol (MIRALAX) 17 g packet Take 17 g by mouth Daily. 30 packet 5   • rosuvastatin (CRESTOR) 20 MG tablet Take 1 tablet by mouth once daily 90 tablet 3   • Soliqua 100-33 UNT-MCG/ML solution pen-injector injection Inject 30 Units under the skin into the appropriate area as directed 2 (Two) Times a Day.     • Symproic 0.2 MG tablet Take 1 tablet by mouth Daily As Needed.     • traZODone (DESYREL) 50 MG tablet TAKE 3 TABLETS BY MOUTH ONCE DAILY AT NIGHT 90 tablet 5     Allergies   Allergen Reactions   • Lisinopril Swelling     FACE SWELLS   • Erythromycin Rash     Social History     Socioeconomic History   • Marital status:    Tobacco Use   • Smoking status: Never Smoker   • Smokeless tobacco: Never Used   Vaping Use   • Vaping Use: Never used   Substance and Sexual Activity   • Alcohol use: No   • Drug use: No   • Sexual activity: Yes     Partners: Male     Birth control/protection: Other     Review of Systems   Constitutional: Negative for unexpected weight change.   Respiratory: Negative for shortness of breath.    Cardiovascular: Negative for chest pain.   Gastrointestinal: Positive for constipation. Negative for abdominal pain and anal bleeding.     Objective   Vitals:    03/28/22 1248   BP: 120/70   Pulse: 70   Temp: 97.6 °F (36.4 °C)   SpO2: 97%     Physical Exam  Constitutional:       Appearance: Normal appearance. She is well-developed.   Eyes:      General: No  scleral icterus.  Cardiovascular:      Rate and Rhythm: Regular rhythm.      Heart sounds: Normal heart sounds. No murmur heard.  Pulmonary:      Effort: Pulmonary effort is normal. No accessory muscle usage.      Breath sounds: Normal breath sounds.   Abdominal:      General: Bowel sounds are normal. There is no distension.      Palpations: Abdomen is soft. There is no mass.      Tenderness: There is no abdominal tenderness. There is no guarding or rebound.   Skin:     General: Skin is warm and dry.      Coloration: Skin is not jaundiced.   Neurological:      Mental Status: She is alert.   Psychiatric:         Behavior: Behavior is cooperative.       Imaging Results (Most Recent)     None        Body mass index is 29.7 kg/m².    Assessment/Plan   Diagnoses and all orders for this visit:    1. Altered bowel function (Primary)  -     Case Request; Standing  -     Case Request    2. Constipation, unspecified constipation type      COLONOSCOPY WITH ANESTHESIA (N/A)    ? Symptoms related to pain medication use vs increase stress.  Proceed with colonoscopy to verify no abnormalities contributing to worsening constipation.     Miralax prep    I have suggested utilizing Miralax starting 7 days prior to colonoscopy to help improve prep.  I have explained stool may be loose but we do not want Soledad Ram to be uncomfortable or experiencing fecal incontinence.  Miralax should be adjusted as needed.     Pt to hold diabetes medication/insulin day of procedure to prevent any risk of complications of hypoglycemia intraprocedure.  If taking insulin 1/2 the PM dose as well    Patient is to continue all blood pressure and cardiac medications prior to procedure and has been advised to take medications morning of procedure   Pt verbalized understanding       Advised pt to stop use of NSAIDs, Fish Oil, and MV 5 days prior to procedure, per Dr Rai protocol.  Tylenol based products are ok to take.  Pt verbalized understanding.      All risks, benefits, alternatives, and indications of colonoscopy procedure have been discussed with the patient. Risks to include perforation of the colon requiring possible surgery or colostomy, risk of bleeding from biopsies or removal of colon tissue, possibility of missing a colon polyp or cancer, or adverse drug reaction.  Benefits to include the diagnosis and management of disease of the colon and rectum. Alternatives to include barium enema, radiographic evaluation, lab testing or no intervention. She verbalizes understanding and agrees.         Epifanio Matamoros, APRN  03/28/22        There are no Patient Instructions on file for this visit.

## 2022-03-28 ENCOUNTER — OFFICE VISIT (OUTPATIENT)
Dept: GASTROENTEROLOGY | Facility: CLINIC | Age: 62
End: 2022-03-28

## 2022-03-28 VITALS
WEIGHT: 184 LBS | DIASTOLIC BLOOD PRESSURE: 70 MMHG | SYSTOLIC BLOOD PRESSURE: 120 MMHG | TEMPERATURE: 97.6 F | BODY MASS INDEX: 29.57 KG/M2 | OXYGEN SATURATION: 97 % | HEIGHT: 66 IN | HEART RATE: 70 BPM

## 2022-03-28 DIAGNOSIS — R19.8 ALTERED BOWEL FUNCTION: Primary | ICD-10-CM

## 2022-03-28 DIAGNOSIS — K59.00 CONSTIPATION, UNSPECIFIED CONSTIPATION TYPE: ICD-10-CM

## 2022-03-28 PROCEDURE — 99214 OFFICE O/P EST MOD 30 MIN: CPT | Performed by: NURSE PRACTITIONER

## 2022-03-31 ENCOUNTER — DOCUMENTATION (OUTPATIENT)
Dept: MAMMOGRAPHY | Facility: HOSPITAL | Age: 62
End: 2022-03-31

## 2022-03-31 ENCOUNTER — HOSPITAL ENCOUNTER (OUTPATIENT)
Dept: MAMMOGRAPHY | Facility: HOSPITAL | Age: 62
Discharge: HOME | End: 2022-03-31
Admitting: INTERNAL MEDICINE

## 2022-03-31 DIAGNOSIS — Z12.31 ENCOUNTER FOR SCREENING MAMMOGRAM FOR MALIGNANT NEOPLASM OF BREAST: ICD-10-CM

## 2022-03-31 DIAGNOSIS — Z80.49 FAMILY HISTORY OF UTERINE CANCER: Primary | ICD-10-CM

## 2022-03-31 PROCEDURE — 77067 SCR MAMMO BI INCL CAD: CPT

## 2022-03-31 PROCEDURE — 77063 BREAST TOMOSYNTHESIS BI: CPT

## 2022-03-31 NOTE — PROGRESS NOTES
As part of a high-risk assessment, this patient was provided a questionnaire to assess personal and family history of cancer. Patients who meet National Comprehensive Cancer Network criteria are offered genetic testing for hereditary cancer at their appointment. If this patient qualifies and consents to genetic testing, the results and management recommendations (when applicable) will be provided to the referring provider.   A [facility] breast cancer risk assessment was also calculated.  Based upon the patient's answers, the TC score was calcuated as  11.8% . Women with a 20% or higher lifetime risk are recommended to pursue annual breast MRI in addition to annual mammogram, per American Cancer Society recommendations.

## 2022-04-01 ENCOUNTER — PATIENT ROUNDING (BHMG ONLY) (OUTPATIENT)
Dept: GASTROENTEROLOGY | Facility: CLINIC | Age: 62
End: 2022-04-01

## 2022-04-01 NOTE — PROGRESS NOTES
April 1, 2022    Hello, may I speak with Soledad Ram?    My name is JUDITH      I am  with [facility]  [address], [address]  [facility] with [facility] [address] [phone].    Before we get started may I verify your date of birth? 1960    I am calling to officially welcome you to our practice and ask about your recent visit. Is this a good time to talk? yes    Tell me about your visit with us. What things went well?  EVERYONE WAS AWESOME.       We're always looking for ways to make our patients' experiences even better. Do you have recommendations on ways we may improve?  no    Overall were you satisfied with your first visit to our practice? yes       I appreciate you taking the time to speak with me today. Is there anything else I can do for you? no      Thank you, and have a great day.

## 2022-04-11 ENCOUNTER — TRANSCRIBE ORDERS (OUTPATIENT)
Dept: ADMINISTRATIVE | Facility: HOSPITAL | Age: 62
End: 2022-04-11

## 2022-04-11 ENCOUNTER — APPOINTMENT (OUTPATIENT)
Dept: MRI IMAGING | Facility: HOSPITAL | Age: 62
End: 2022-04-11

## 2022-04-11 ENCOUNTER — LAB (OUTPATIENT)
Dept: LAB | Facility: HOSPITAL | Age: 62
End: 2022-04-11

## 2022-04-11 DIAGNOSIS — M54.16 LUMBAR RADICULOPATHY: Primary | ICD-10-CM

## 2022-04-11 DIAGNOSIS — Z80.49 FAMILY HISTORY OF UTERINE CANCER: ICD-10-CM

## 2022-04-14 RX ORDER — INSULIN GLARGINE AND LIXISENATIDE 100; 33 U/ML; UG/ML
INJECTION, SOLUTION SUBCUTANEOUS
Qty: 15 ML | Refills: 5 | Status: SHIPPED | OUTPATIENT
Start: 2022-04-14 | End: 2022-10-27 | Stop reason: ALTCHOICE

## 2022-04-18 ENCOUNTER — ANESTHESIA EVENT (OUTPATIENT)
Dept: GASTROENTEROLOGY | Facility: HOSPITAL | Age: 62
End: 2022-04-18

## 2022-04-18 ENCOUNTER — ANESTHESIA (OUTPATIENT)
Dept: GASTROENTEROLOGY | Facility: HOSPITAL | Age: 62
End: 2022-04-18

## 2022-04-18 ENCOUNTER — HOSPITAL ENCOUNTER (OUTPATIENT)
Facility: HOSPITAL | Age: 62
Setting detail: HOSPITAL OUTPATIENT SURGERY
Discharge: HOME | End: 2022-04-18
Attending: INTERNAL MEDICINE | Admitting: INTERNAL MEDICINE

## 2022-04-18 VITALS
TEMPERATURE: 97.9 F | RESPIRATION RATE: 17 BRPM | WEIGHT: 180 LBS | DIASTOLIC BLOOD PRESSURE: 71 MMHG | HEART RATE: 74 BPM | HEIGHT: 66 IN | BODY MASS INDEX: 28.93 KG/M2 | SYSTOLIC BLOOD PRESSURE: 116 MMHG | OXYGEN SATURATION: 96 %

## 2022-04-18 DIAGNOSIS — R19.8 ALTERED BOWEL FUNCTION: ICD-10-CM

## 2022-04-18 LAB — GLUCOSE BLDC GLUCOMTR-MCNC: 94 MG/DL (ref 70–130)

## 2022-04-18 PROCEDURE — 45378 DIAGNOSTIC COLONOSCOPY: CPT | Performed by: INTERNAL MEDICINE

## 2022-04-18 PROCEDURE — 82962 GLUCOSE BLOOD TEST: CPT

## 2022-04-18 PROCEDURE — 25010000002 PROPOFOL 10 MG/ML EMULSION: Performed by: NURSE ANESTHETIST, CERTIFIED REGISTERED

## 2022-04-18 RX ORDER — SODIUM CHLORIDE 0.9 % (FLUSH) 0.9 %
10 SYRINGE (ML) INJECTION AS NEEDED
Status: DISCONTINUED | OUTPATIENT
Start: 2022-04-18 | End: 2022-04-18 | Stop reason: HOSPADM

## 2022-04-18 RX ORDER — PROPOFOL 10 MG/ML
VIAL (ML) INTRAVENOUS AS NEEDED
Status: DISCONTINUED | OUTPATIENT
Start: 2022-04-18 | End: 2022-04-18 | Stop reason: SURG

## 2022-04-18 RX ORDER — SODIUM CHLORIDE 9 MG/ML
500 INJECTION, SOLUTION INTRAVENOUS CONTINUOUS PRN
Status: DISCONTINUED | OUTPATIENT
Start: 2022-04-18 | End: 2022-04-18 | Stop reason: HOSPADM

## 2022-04-18 RX ADMIN — SODIUM CHLORIDE 500 ML: 9 INJECTION, SOLUTION INTRAVENOUS at 07:49

## 2022-04-18 RX ADMIN — PROPOFOL 100 MG: 10 INJECTION, EMULSION INTRAVENOUS at 09:17

## 2022-04-18 RX ADMIN — PROPOFOL 100 MG: 10 INJECTION, EMULSION INTRAVENOUS at 09:10

## 2022-04-18 RX ADMIN — PROPOFOL 50 MG: 10 INJECTION, EMULSION INTRAVENOUS at 09:24

## 2022-04-18 NOTE — ANESTHESIA PREPROCEDURE EVALUATION
Anesthesia Evaluation     no history of anesthetic complications:  NPO Solid Status: > 8 hours  NPO Liquid Status: > 2 hours           Airway   Mallampati: I  TM distance: >3 FB  Neck ROM: full  No difficulty expected  Dental      Pulmonary    (-) not a smoker  Cardiovascular   Exercise tolerance: good (4-7 METS)    (+) hypertension, hyperlipidemia,       Neuro/Psych  (-) seizures, TIA, CVA  GI/Hepatic/Renal/Endo    (+)   diabetes mellitus,   (-) liver disease, no renal disease    Musculoskeletal     Abdominal    Substance History      OB/GYN          Other   arthritis,                      Anesthesia Plan    ASA 2     MAC     intravenous induction     Anesthetic plan, all risks, benefits, and alternatives have been provided, discussed and informed consent has been obtained with: patient.        CODE STATUS:

## 2022-04-18 NOTE — ANESTHESIA POSTPROCEDURE EVALUATION
Patient: Soledad Ram    Procedure Summary     Date: 04/18/22 Room / Location: [facility] ENDOSCOPY 5 / [facility] ENDOSCOPY    Anesthesia Start: 0909 Anesthesia Stop: 0930    Procedure: COLONOSCOPY WITH ANESTHESIA (N/A ) Diagnosis:       Altered bowel function      (Altered bowel function [R19.8])    Surgeons: Simba Rai DO Provider: Helder Hicks CRNA    Anesthesia Type: MAC ASA Status: 2          Anesthesia Type: MAC    Vitals  Vitals Value Taken Time   /68 04/18/22 0930   Temp     Pulse 81 04/18/22 0930   Resp 15 04/18/22 0930   SpO2 97 % 04/18/22 0930           Post Anesthesia Care and Evaluation    Patient location during evaluation: PHASE II  Patient participation: complete - patient participated  Level of consciousness: awake and alert  Pain score: 0  Pain management: adequate  Airway patency: patent  Anesthetic complications: No anesthetic complications  PONV Status: none  Cardiovascular status: acceptable and stable  Respiratory status: acceptable  Hydration status: acceptable

## 2022-04-19 ENCOUNTER — TELEPHONE (OUTPATIENT)
Dept: GASTROENTEROLOGY | Facility: CLINIC | Age: 62
End: 2022-04-19

## 2022-04-29 ENCOUNTER — DOCUMENTATION (OUTPATIENT)
Dept: LAB | Facility: HOSPITAL | Age: 62
End: 2022-04-29

## 2022-05-02 ENCOUNTER — HOSPITAL ENCOUNTER (OUTPATIENT)
Dept: MRI IMAGING | Facility: HOSPITAL | Age: 62
Discharge: HOME | End: 2022-05-02
Admitting: NURSE PRACTITIONER

## 2022-05-02 DIAGNOSIS — M54.16 LUMBAR RADICULOPATHY: ICD-10-CM

## 2022-05-02 PROCEDURE — 72148 MRI LUMBAR SPINE W/O DYE: CPT

## 2022-06-27 ENCOUNTER — OFFICE VISIT (OUTPATIENT)
Dept: INTERNAL MEDICINE | Facility: CLINIC | Age: 62
End: 2022-06-27

## 2022-06-27 ENCOUNTER — TELEPHONE (OUTPATIENT)
Dept: INTERNAL MEDICINE | Facility: CLINIC | Age: 62
End: 2022-06-27

## 2022-06-27 VITALS
BODY MASS INDEX: 28.93 KG/M2 | RESPIRATION RATE: 16 BRPM | DIASTOLIC BLOOD PRESSURE: 70 MMHG | HEIGHT: 66 IN | OXYGEN SATURATION: 99 % | TEMPERATURE: 97.3 F | HEART RATE: 79 BPM | SYSTOLIC BLOOD PRESSURE: 121 MMHG | WEIGHT: 180 LBS

## 2022-06-27 DIAGNOSIS — I10 ESSENTIAL HYPERTENSION: ICD-10-CM

## 2022-06-27 DIAGNOSIS — E78.5 HYPERLIPIDEMIA, UNSPECIFIED HYPERLIPIDEMIA TYPE: ICD-10-CM

## 2022-06-27 DIAGNOSIS — Z79.4 TYPE 2 DIABETES MELLITUS WITH HYPERGLYCEMIA, WITH LONG-TERM CURRENT USE OF INSULIN: Primary | ICD-10-CM

## 2022-06-27 DIAGNOSIS — E11.65 TYPE 2 DIABETES MELLITUS WITH HYPERGLYCEMIA, WITH LONG-TERM CURRENT USE OF INSULIN: Primary | ICD-10-CM

## 2022-06-27 LAB
EXPIRATION DATE: ABNORMAL
HBA1C MFR BLD: 9.6 %
Lab: ABNORMAL

## 2022-06-27 PROCEDURE — 3046F HEMOGLOBIN A1C LEVEL >9.0%: CPT | Performed by: INTERNAL MEDICINE

## 2022-06-27 PROCEDURE — 83036 HEMOGLOBIN GLYCOSYLATED A1C: CPT | Performed by: INTERNAL MEDICINE

## 2022-06-27 PROCEDURE — 99214 OFFICE O/P EST MOD 30 MIN: CPT | Performed by: INTERNAL MEDICINE

## 2022-06-27 PROCEDURE — 36416 COLLJ CAPILLARY BLOOD SPEC: CPT | Performed by: INTERNAL MEDICINE

## 2022-06-27 RX ORDER — METFORMIN HYDROCHLORIDE 500 MG/1
1000 TABLET, EXTENDED RELEASE ORAL 2 TIMES DAILY
Qty: 360 TABLET | Refills: 3 | Status: SHIPPED | OUTPATIENT
Start: 2022-06-27 | End: 2022-06-27

## 2022-06-27 RX ORDER — ROSUVASTATIN CALCIUM 20 MG/1
20 TABLET, COATED ORAL DAILY
Qty: 90 TABLET | Refills: 3 | Status: SHIPPED | OUTPATIENT
Start: 2022-06-27 | End: 2022-10-27 | Stop reason: SDUPTHER

## 2022-06-27 RX ORDER — MOMETASONE FUROATE 1 MG/G
CREAM TOPICAL 2 TIMES DAILY
COMMUNITY
Start: 2022-04-12 | End: 2023-03-01

## 2022-06-27 RX ORDER — METFORMIN HYDROCHLORIDE 500 MG/1
1000 TABLET, EXTENDED RELEASE ORAL 2 TIMES DAILY
Qty: 360 TABLET | Refills: 3 | Status: SHIPPED | OUTPATIENT
Start: 2022-06-27 | End: 2022-10-27 | Stop reason: SDUPTHER

## 2022-06-27 RX ORDER — LOSARTAN POTASSIUM 50 MG/1
50 TABLET ORAL DAILY
Qty: 90 TABLET | Refills: 3 | Status: SHIPPED | OUTPATIENT
Start: 2022-06-27 | End: 2022-10-27 | Stop reason: SDUPTHER

## 2022-06-27 NOTE — PROGRESS NOTES
Chief Complaint   Patient presents with   • Follow-up   • Diabetes         History:  Soledad Ram is a 62 y.o. female who presents today for evaluation of the above problems.    She presents today for 4-month follow-up on her diabetes.  Last A1c was 10.1% on February 23, 2022.  Her Jardiance was increased to 25 mg daily.  She was to continue current dose of metformin Amaryl and Soliqua.    Today, her A1c has improved to 9.6%.  She is continuing to watch her diet and decreasing carbohydrates.  She says her diet is good but also thinks it could be a little bit better.  Additionally she is more active and has lost 4 pounds since the last visit    She hardly hears from her brother now, and feels much better mentally    She is a trigger finger of the right Geovany finger which bothers her but is not bothersome enough to want a referral to Dr. Orestes MICHELLE      ROS:  Review of Systems     As above      Current Outpatient Medications:   •  azelastine (ASTELIN) 0.1 % nasal spray, 2 sprays into the nostril(s) as directed by provider 2 (Two) Times a Day. Use in each nostril as directed, Disp: 1 each, Rfl: 12  •  cyclobenzaprine (FLEXERIL) 10 MG tablet, Take 10 mg by mouth 2 (Two) Times a Day As Needed for Muscle Spasms., Disp: , Rfl:   •  desonide (DESOWEN) 0.05 % cream, Apply 1 application topically to the appropriate area as directed 2 (Two) Times a Day., Disp: , Rfl:   •  DULoxetine (CYMBALTA) 60 MG capsule, Take 120 mg by mouth. Takes 2 capsules daily, Disp: , Rfl:   •  empagliflozin (Jardiance) 25 MG tablet tablet, Take 1 tablet by mouth Daily., Disp: 30 tablet, Rfl: 5  •  fluticasone (Flonase) 50 MCG/ACT nasal spray, 2 sprays into the nostril(s) as directed by provider Daily., Disp: 16 g, Rfl: 0  •  gabapentin (NEURONTIN) 600 MG tablet, Take 600 mg by mouth Every 8 (Eight) Hours As Needed., Disp: , Rfl:   •  glimepiride (Amaryl) 4 MG tablet, Take 1 tablet by mouth Every Morning Before Breakfast., Disp: 30 tablet,  Rfl: 5  •  losartan (COZAAR) 50 MG tablet, Take 1 tablet by mouth Daily., Disp: 90 tablet, Rfl: 3  •  metFORMIN ER (GLUCOPHAGE-XR) 500 MG 24 hr tablet, Take 2 tablets by mouth 2 (Two) Times a Day., Disp: 360 tablet, Rfl: 3  •  mometasone (ELOCON) 0.1 % cream, Apply  topically to the appropriate area as directed 2 (Two) Times a Day., Disp: , Rfl:   •  NON FORMULARY, [place] pain cream, Disp: , Rfl:   •  oxyCODONE-acetaminophen (PERCOCET)  MG per tablet, Take 1 tablet by mouth Every 6 (Six) Hours As Needed for Moderate Pain ., Disp: , Rfl:   •  polyethylene glycol (MIRALAX) 17 g packet, Take 17 g by mouth Daily., Disp: 30 packet, Rfl: 5  •  rosuvastatin (CRESTOR) 20 MG tablet, Take 1 tablet by mouth Daily., Disp: 90 tablet, Rfl: 3  •  Soliqua 100-33 UNT-MCG/ML solution pen-injector injection, INJECT 30 UNITS SUBCUTANEOUSLY AS DIRECTED TWICE DAILY, Disp: 15 mL, Rfl: 5  •  Symproic 0.2 MG tablet, Take 1 tablet by mouth Daily As Needed., Disp: , Rfl:   •  traZODone (DESYREL) 50 MG tablet, TAKE 3 TABLETS BY MOUTH ONCE DAILY AT NIGHT, Disp: 90 tablet, Rfl: 5    Lab Results   Component Value Date    GLUCOSE 211 (H) 02/23/2022    BUN 12 02/23/2022    CREATININE 0.76 02/23/2022    EGFRIFNONA 78 02/23/2022    BCR 15.8 02/23/2022    K 4.4 02/23/2022    CO2 27.0 02/23/2022    CALCIUM 9.8 02/23/2022    ALBUMIN 4.50 02/23/2022    AST 48 (H) 02/23/2022    ALT 30 02/23/2022       WBC   Date Value Ref Range Status   02/23/2022 10.10 3.40 - 10.80 10*3/mm3 Final     RBC   Date Value Ref Range Status   02/23/2022 5.08 3.77 - 5.28 10*6/mm3 Final     Hemoglobin   Date Value Ref Range Status   02/23/2022 15.3 12.0 - 15.9 g/dL Final     Hematocrit   Date Value Ref Range Status   02/23/2022 47.1 (H) 34.0 - 46.6 % Final     MCV   Date Value Ref Range Status   02/23/2022 92.7 79.0 - 97.0 fL Final     MCH   Date Value Ref Range Status   02/23/2022 30.1 26.6 - 33.0 pg Final     MCHC   Date Value Ref Range Status   02/23/2022 32.5 31.5 -  "35.7 g/dL Final     RDW   Date Value Ref Range Status   02/23/2022 13.2 12.3 - 15.4 % Final     RDW-SD   Date Value Ref Range Status   02/23/2022 45.3 37.0 - 54.0 fl Final     MPV   Date Value Ref Range Status   02/23/2022 11.6 6.0 - 12.0 fL Final     Platelets   Date Value Ref Range Status   02/23/2022 249 140 - 450 10*3/mm3 Final     Neutrophil %   Date Value Ref Range Status   02/23/2022 52.6 42.7 - 76.0 % Final     Lymphocyte %   Date Value Ref Range Status   02/23/2022 27.5 19.6 - 45.3 % Final     Monocyte %   Date Value Ref Range Status   02/23/2022 8.0 5.0 - 12.0 % Final     Eosinophil %   Date Value Ref Range Status   02/23/2022 10.4 (H) 0.3 - 6.2 % Final     Basophil %   Date Value Ref Range Status   02/23/2022 1.2 0.0 - 1.5 % Final     Immature Grans %   Date Value Ref Range Status   02/23/2022 0.3 0.0 - 0.5 % Final     Neutrophils, Absolute   Date Value Ref Range Status   02/23/2022 5.31 1.70 - 7.00 10*3/mm3 Final     Lymphocytes, Absolute   Date Value Ref Range Status   02/23/2022 2.78 0.70 - 3.10 10*3/mm3 Final     Monocytes, Absolute   Date Value Ref Range Status   02/23/2022 0.81 0.10 - 0.90 10*3/mm3 Final     Eosinophils, Absolute   Date Value Ref Range Status   02/23/2022 1.05 (H) 0.00 - 0.40 10*3/mm3 Final     Basophils, Absolute   Date Value Ref Range Status   02/23/2022 0.12 0.00 - 0.20 10*3/mm3 Final     Immature Grans, Absolute   Date Value Ref Range Status   02/23/2022 0.03 0.00 - 0.05 10*3/mm3 Final     nRBC   Date Value Ref Range Status   02/23/2022 0.0 0.0 - 0.2 /100 WBC Final         OBJECTIVE:  Visit Vitals  /70 (BP Location: Left arm, Patient Position: Sitting, Cuff Size: Adult)   Pulse 79   Temp 97.3 °F (36.3 °C) (Oral)   Resp 16   Ht 167.6 cm (65.98\")   Wt 81.6 kg (180 lb)   SpO2 99%   BMI 29.07 kg/m²      Physical Exam  Vitals and nursing note reviewed.   Constitutional:       General: She is not in acute distress.     Appearance: She is well-developed. She is not diaphoretic. "      Comments: Pleasant   HENT:      Head: Normocephalic and atraumatic.   Eyes:      Pupils: Pupils are equal, round, and reactive to light.   Neck:      Thyroid: No thyromegaly.      Trachea: Phonation normal.   Pulmonary:      Effort: No respiratory distress.   Musculoskeletal:      Comments: Slight decreased range of motion of the right third digit at the PIP and slight tenderness.  No cysts appreciated.   Skin:     Coloration: Skin is not pale.      Findings: No erythema.   Neurological:      Mental Status: She is alert.   Psychiatric:         Behavior: Behavior normal.         Thought Content: Thought content normal.         Judgment: Judgment normal.         Assessment/Plan    Diagnoses and all orders for this visit:    1. Type 2 diabetes mellitus with hyperglycemia, with long-term current use of insulin (HCC) (Primary)  -     POC Glycosylated Hemoglobin (Hb A1C)  -     metFORMIN ER (GLUCOPHAGE-XR) 500 MG 24 hr tablet; Take 2 tablets by mouth 2 (Two) Times a Day.  Dispense: 360 tablet; Refill: 3    2. Essential hypertension  -     losartan (COZAAR) 50 MG tablet; Take 1 tablet by mouth Daily.  Dispense: 90 tablet; Refill: 3    3. Hyperlipidemia, unspecified hyperlipidemia type  -     rosuvastatin (CRESTOR) 20 MG tablet; Take 1 tablet by mouth Daily.  Dispense: 90 tablet; Refill: 3    Other orders  -     Discontinue: metFORMIN ER (GLUCOPHAGE-XR) 500 MG 24 hr tablet; Take 2 tablets by mouth 2 (Two) Times a Day.  Dispense: 360 tablet; Refill: 3      Her diabetes is improved with an A1c of 9.6% from 10.1% 4 months ago.  We will continue her current regimen and focus more on lifestyle modification and diet.  She is on the maximum doses of the medications that she is currently on.  If we need to make further adjustments we will likely need to discontinue the Soliqua and add on insulin and GLP-1 agonist separately so we can make further adjustments in doses    Refill medications as above.  Blood pressure well  controlled.    Follow-up in 4 months with A1c in the office.  Follow-up sooner if needed.    Return in about 4 months (around 10/27/2022) for Recheck A1c in office .      AURORA Sarabia MD  10:03 CDT  6/27/2022

## 2022-06-27 NOTE — TELEPHONE ENCOUNTER
Caller: Soledad Ram    Relationship: Self    Best call back number:[phone]    What is the best time to reach you: AS SOON AS POSSIBLE PLEASE     Who are you requesting to speak with (clinical staff, provider,  specific staff member): PROVIDER OR NURSE      What was the call regarding: PATIENT REQUESTING A CALL BACK TO DISCUSS WHAT SHE SHOULD DO AFTER TESTING POSITIVE FOR COVID WITH A HOME TEST   PATIENT STATES ONLY SYMPTOMS SHE IS HAVING IS FATIGUE AND NO APETITE   DOES SHE NEED TO RETEST AT A FACILITY   ALSO PATIENT STATES SHE WAS IN OFFICE THIS MORNING AND IS SORRY SHE DIDN'T KNOW    Do you require a callback:  YES

## 2022-06-27 NOTE — TELEPHONE ENCOUNTER
She needs to quarantine for 5 days from the onset of her symptoms.  She does not necessarily need to retest here.  She would likely qualify for paxlovid which is under emergency use authorization and has been shown to decrease severity of illness and likelihood of admission to the hospital.  Let me know if she is interested in that medication and we can discuss further.

## 2022-06-29 NOTE — TELEPHONE ENCOUNTER
I have sent over Paxlovid to her pharmacy.  Please have her stop the rosuvastatin while taking the medication, increase her trazodone to 50 mg and also try to decrease her Percocet by half. Paxlovid can cause increased side effects with trazodone, and increase the effects of Percocet.  Let us know if she has any questions or concerns.

## 2022-06-29 NOTE — TELEPHONE ENCOUNTER
PATIENT HAS BEEN CALLED, SHE WOULD LIKE FOR THE PAXLOVID TO BE SENT TO [facility] [place] [address].

## 2022-06-29 NOTE — TELEPHONE ENCOUNTER
PATIENT HAS BEEN CALLED, GIVEN MESSAGE AND STATED THAT IT HAS BEEN 3 DAYS SINCE HER ONSET OF SYMPTOMS.    PATIENT STATED THAT SHE WOULD LIKE TO HAVE THE PAXLOVID.

## 2022-07-11 RX ORDER — TRAZODONE HYDROCHLORIDE 50 MG/1
TABLET ORAL
Qty: 90 TABLET | Refills: 1 | Status: SHIPPED | OUTPATIENT
Start: 2022-07-11 | End: 2022-10-12

## 2022-09-02 ENCOUNTER — TELEPHONE (OUTPATIENT)
Dept: INTERNAL MEDICINE | Facility: CLINIC | Age: 62
End: 2022-09-02

## 2022-09-02 RX ORDER — METHYLPREDNISOLONE 4 MG/1
TABLET ORAL
Qty: 21 EACH | Refills: 0 | Status: SHIPPED | OUTPATIENT
Start: 2022-09-02 | End: 2022-10-27

## 2022-09-02 RX ORDER — AMOXICILLIN AND CLAVULANATE POTASSIUM 875; 125 MG/1; MG/1
1 TABLET, FILM COATED ORAL 2 TIMES DAILY
Qty: 14 TABLET | Refills: 0 | Status: SHIPPED | OUTPATIENT
Start: 2022-09-02 | End: 2022-09-09

## 2022-09-02 NOTE — TELEPHONE ENCOUNTER
Soledad Ram called and stated that they would like a callback from PCP or nurse regarding symtpoms they are experiencing right now.    They would like an antibiotic to be called in for treatment of these symptoms.    Symptoms include: Headache, Congestion, dizziness, sinus issues    Preferred Pharmacy: [facility] [address] [place] [address] [address] [facility] - [phone] [facility] [phone] FX     Please advise if this can be done with a callback to [phone]     Thank you,  Franky Michelle, PCT

## 2022-09-02 NOTE — TELEPHONE ENCOUNTER
PATIENT HAS RETURNED CALL, SHE STATED THAT SHE HAS NOT TAKEN A COVID TEST.  SHE STATED THAT SHE HAS HAD THE SYMPTOMS FOR 2 WEEKS.   PATIENT WILL TAKE THE HOME COVID TEST AND CALL THE OFFICE BACK.

## 2022-09-02 NOTE — TELEPHONE ENCOUNTER
Can you please get additional information?.  Has she taken COVID test?  How long you have symptoms been ongoing?.  If she has not taken a COVID test would recommend a home test and let us know with those results.

## 2022-09-08 DIAGNOSIS — Z79.4 TYPE 2 DIABETES MELLITUS WITHOUT COMPLICATION, WITH LONG-TERM CURRENT USE OF INSULIN: ICD-10-CM

## 2022-09-08 DIAGNOSIS — E11.9 TYPE 2 DIABETES MELLITUS WITHOUT COMPLICATION, WITH LONG-TERM CURRENT USE OF INSULIN: ICD-10-CM

## 2022-10-12 RX ORDER — TRAZODONE HYDROCHLORIDE 50 MG/1
150 TABLET ORAL NIGHTLY
Qty: 90 TABLET | Refills: 5 | Status: SHIPPED | OUTPATIENT
Start: 2022-10-12 | End: 2023-03-07 | Stop reason: SDUPTHER

## 2022-10-27 ENCOUNTER — OFFICE VISIT (OUTPATIENT)
Dept: INTERNAL MEDICINE | Facility: CLINIC | Age: 62
End: 2022-10-27

## 2022-10-27 VITALS
RESPIRATION RATE: 16 BRPM | OXYGEN SATURATION: 100 % | SYSTOLIC BLOOD PRESSURE: 118 MMHG | DIASTOLIC BLOOD PRESSURE: 66 MMHG | WEIGHT: 181.2 LBS | BODY MASS INDEX: 29.12 KG/M2 | HEIGHT: 66 IN | HEART RATE: 91 BPM

## 2022-10-27 DIAGNOSIS — I10 ESSENTIAL HYPERTENSION: ICD-10-CM

## 2022-10-27 DIAGNOSIS — E78.5 HYPERLIPIDEMIA, UNSPECIFIED HYPERLIPIDEMIA TYPE: ICD-10-CM

## 2022-10-27 DIAGNOSIS — E11.65 TYPE 2 DIABETES MELLITUS WITH HYPERGLYCEMIA, WITH LONG-TERM CURRENT USE OF INSULIN: Primary | ICD-10-CM

## 2022-10-27 DIAGNOSIS — Z79.4 TYPE 2 DIABETES MELLITUS WITH HYPERGLYCEMIA, WITH LONG-TERM CURRENT USE OF INSULIN: Primary | ICD-10-CM

## 2022-10-27 LAB
EXPIRATION DATE: ABNORMAL
HBA1C MFR BLD: 10.2 %
Lab: ABNORMAL

## 2022-10-27 PROCEDURE — 3046F HEMOGLOBIN A1C LEVEL >9.0%: CPT | Performed by: INTERNAL MEDICINE

## 2022-10-27 PROCEDURE — 0124A COVID-19 (PFIZER) BIVALENT BOOSTER 12+YRS: CPT | Performed by: INTERNAL MEDICINE

## 2022-10-27 PROCEDURE — 99214 OFFICE O/P EST MOD 30 MIN: CPT | Performed by: INTERNAL MEDICINE

## 2022-10-27 PROCEDURE — 83036 HEMOGLOBIN GLYCOSYLATED A1C: CPT | Performed by: INTERNAL MEDICINE

## 2022-10-27 PROCEDURE — 91312 COVID-19 (PFIZER) BIVALENT BOOSTER 12+YRS: CPT | Performed by: INTERNAL MEDICINE

## 2022-10-27 RX ORDER — GLIMEPIRIDE 4 MG/1
4 TABLET ORAL
Qty: 30 TABLET | Refills: 5 | Status: SHIPPED | OUTPATIENT
Start: 2022-10-27 | End: 2022-12-14 | Stop reason: SDUPTHER

## 2022-10-27 RX ORDER — METFORMIN HYDROCHLORIDE 500 MG/1
1000 TABLET, EXTENDED RELEASE ORAL 2 TIMES DAILY
Qty: 360 TABLET | Refills: 3 | Status: SHIPPED | OUTPATIENT
Start: 2022-10-27 | End: 2023-02-07 | Stop reason: SINTOL

## 2022-10-27 RX ORDER — ORAL SEMAGLUTIDE 7 MG/1
7 TABLET ORAL DAILY
Qty: 30 TABLET | Refills: 2 | Status: SHIPPED | OUTPATIENT
Start: 2022-11-27 | End: 2023-01-31

## 2022-10-27 RX ORDER — ROSUVASTATIN CALCIUM 20 MG/1
20 TABLET, COATED ORAL DAILY
Qty: 90 TABLET | Refills: 3 | Status: SHIPPED | OUTPATIENT
Start: 2022-10-27 | End: 2022-12-14 | Stop reason: SDUPTHER

## 2022-10-27 RX ORDER — PROCHLORPERAZINE 25 MG/1
SUPPOSITORY RECTAL
Qty: 3 EACH | Refills: 5 | Status: SHIPPED | OUTPATIENT
Start: 2022-10-27 | End: 2023-01-31

## 2022-10-27 RX ORDER — LOSARTAN POTASSIUM 50 MG/1
50 TABLET ORAL DAILY
Qty: 90 TABLET | Refills: 3 | Status: SHIPPED | OUTPATIENT
Start: 2022-10-27 | End: 2022-12-14 | Stop reason: SDUPTHER

## 2022-10-27 RX ORDER — PROCHLORPERAZINE 25 MG/1
1 SUPPOSITORY RECTAL ONCE
Qty: 1 EACH | Refills: 5 | Status: SHIPPED | OUTPATIENT
Start: 2022-10-27 | End: 2022-10-27

## 2022-10-27 NOTE — PROGRESS NOTES
Chief Complaint   Patient presents with   • Diabetes     Patient states that she is here today for a recheck of A1C         History:  Soledad Ram is a 62 y.o. female who presents today for evaluation of the above problems.      Soledad presents today for 3-month follow-up on her diabetes.  Her last A1c was 9.6 on June 27.  It has increased up to 10.2% today.  She states her blood sugar is high and low.  1 morning it was 80.  Her diet is up and down.  Sometimes she does not eat.  She is concerned about her 's health as he does not seem to have any type of drive at the moment    She is willing to try the Dexcom    HPI      ROS:  Review of Systems    As above       Current Outpatient Medications:   •  azelastine (ASTELIN) 0.1 % nasal spray, 2 sprays into the nostril(s) as directed by provider 2 (Two) Times a Day. Use in each nostril as directed, Disp: 1 each, Rfl: 12  •  cyclobenzaprine (FLEXERIL) 10 MG tablet, Take 10 mg by mouth 2 (Two) Times a Day As Needed for Muscle Spasms., Disp: , Rfl:   •  desonide (DESOWEN) 0.05 % cream, Apply 1 application topically to the appropriate area as directed 2 (Two) Times a Day., Disp: , Rfl:   •  DULoxetine (CYMBALTA) 60 MG capsule, Take 120 mg by mouth. Takes 2 capsules daily, Disp: , Rfl:   •  empagliflozin (Jardiance) 25 MG tablet tablet, Take 1 tablet by mouth Daily., Disp: 30 tablet, Rfl: 2  •  fluticasone (Flonase) 50 MCG/ACT nasal spray, 2 sprays into the nostril(s) as directed by provider Daily., Disp: 16 g, Rfl: 0  •  gabapentin (NEURONTIN) 600 MG tablet, Take 600 mg by mouth Every 8 (Eight) Hours As Needed., Disp: , Rfl:   •  glimepiride (Amaryl) 4 MG tablet, Take 1 tablet by mouth Every Morning Before Breakfast., Disp: 30 tablet, Rfl: 5  •  losartan (COZAAR) 50 MG tablet, Take 1 tablet by mouth Daily., Disp: 90 tablet, Rfl: 3  •  metFORMIN ER (GLUCOPHAGE-XR) 500 MG 24 hr tablet, Take 2 tablets by mouth 2 (Two) Times a Day., Disp: 360 tablet, Rfl: 3  •   mometasone (ELOCON) 0.1 % cream, Apply  topically to the appropriate area as directed 2 (Two) Times a Day., Disp: , Rfl:   •  NON FORMULARY, [place] pain cream, Disp: , Rfl:   •  oxyCODONE-acetaminophen (PERCOCET)  MG per tablet, Take 1 tablet by mouth Every 6 (Six) Hours As Needed for Moderate Pain ., Disp: , Rfl:   •  polyethylene glycol (MIRALAX) 17 g packet, Take 17 g by mouth Daily., Disp: 30 packet, Rfl: 5  •  rosuvastatin (CRESTOR) 20 MG tablet, Take 1 tablet by mouth Daily., Disp: 90 tablet, Rfl: 3  •  Symproic 0.2 MG tablet, Take 1 tablet by mouth Daily As Needed., Disp: , Rfl:   •  traZODone (DESYREL) 50 MG tablet, Take 3 tablets by mouth Every Night., Disp: 90 tablet, Rfl: 5  •  Continuous Blood Gluc  (Dexcom G6 ) device, 1 application 1 (One) Time for 1 dose., Disp: 1 each, Rfl: 5  •  Continuous Blood Gluc Sensor (Dexcom G6 Sensor), Every 10 (Ten) Days., Disp: 3 each, Rfl: 5  •  Insulin Glargine (LANTUS SOLOSTAR) 100 UNIT/ML injection pen, Inject 30 Units under the skin into the appropriate area as directed 2 (Two) Times a Day., Disp: 18 mL, Rfl: 5  •  [START ON 11/27/2022] Semaglutide (Rybelsus) 7 MG tablet, Take 7 mg by mouth Daily., Disp: 30 tablet, Rfl: 2    Lab Results   Component Value Date    GLUCOSE 211 (H) 02/23/2022    BUN 12 02/23/2022    CREATININE 0.76 02/23/2022    EGFRIFNONA 78 02/23/2022    BCR 15.8 02/23/2022    K 4.4 02/23/2022    CO2 27.0 02/23/2022    CALCIUM 9.8 02/23/2022    ALBUMIN 4.50 02/23/2022    AST 48 (H) 02/23/2022    ALT 30 02/23/2022       WBC   Date Value Ref Range Status   02/23/2022 10.10 3.40 - 10.80 10*3/mm3 Final     RBC   Date Value Ref Range Status   02/23/2022 5.08 3.77 - 5.28 10*6/mm3 Final     Hemoglobin   Date Value Ref Range Status   02/23/2022 15.3 12.0 - 15.9 g/dL Final     Hematocrit   Date Value Ref Range Status   02/23/2022 47.1 (H) 34.0 - 46.6 % Final     MCV   Date Value Ref Range Status   02/23/2022 92.7 79.0 - 97.0 fL Final  "    MCH   Date Value Ref Range Status   02/23/2022 30.1 26.6 - 33.0 pg Final     MCHC   Date Value Ref Range Status   02/23/2022 32.5 31.5 - 35.7 g/dL Final     RDW   Date Value Ref Range Status   02/23/2022 13.2 12.3 - 15.4 % Final     RDW-SD   Date Value Ref Range Status   02/23/2022 45.3 37.0 - 54.0 fl Final     MPV   Date Value Ref Range Status   02/23/2022 11.6 6.0 - 12.0 fL Final     Platelets   Date Value Ref Range Status   02/23/2022 249 140 - 450 10*3/mm3 Final     Neutrophil %   Date Value Ref Range Status   02/23/2022 52.6 42.7 - 76.0 % Final     Lymphocyte %   Date Value Ref Range Status   02/23/2022 27.5 19.6 - 45.3 % Final     Monocyte %   Date Value Ref Range Status   02/23/2022 8.0 5.0 - 12.0 % Final     Eosinophil %   Date Value Ref Range Status   02/23/2022 10.4 (H) 0.3 - 6.2 % Final     Basophil %   Date Value Ref Range Status   02/23/2022 1.2 0.0 - 1.5 % Final     Immature Grans %   Date Value Ref Range Status   02/23/2022 0.3 0.0 - 0.5 % Final     Neutrophils, Absolute   Date Value Ref Range Status   02/23/2022 5.31 1.70 - 7.00 10*3/mm3 Final     Lymphocytes, Absolute   Date Value Ref Range Status   02/23/2022 2.78 0.70 - 3.10 10*3/mm3 Final     Monocytes, Absolute   Date Value Ref Range Status   02/23/2022 0.81 0.10 - 0.90 10*3/mm3 Final     Eosinophils, Absolute   Date Value Ref Range Status   02/23/2022 1.05 (H) 0.00 - 0.40 10*3/mm3 Final     Basophils, Absolute   Date Value Ref Range Status   02/23/2022 0.12 0.00 - 0.20 10*3/mm3 Final     Immature Grans, Absolute   Date Value Ref Range Status   02/23/2022 0.03 0.00 - 0.05 10*3/mm3 Final     nRBC   Date Value Ref Range Status   02/23/2022 0.0 0.0 - 0.2 /100 WBC Final         OBJECTIVE:  Visit Vitals  /66 (BP Location: Left arm, Patient Position: Sitting, Cuff Size: Adult)   Pulse 91   Resp 16   Ht 167.6 cm (66\")   Wt 82.2 kg (181 lb 3.2 oz)   SpO2 100%   BMI 29.25 kg/m²      Physical Exam  Vitals and nursing note reviewed. "   Constitutional:       General: She is not in acute distress.     Appearance: Normal appearance. She is well-developed. She is not diaphoretic.      Comments: Pleasant      HENT:      Head: Normocephalic and atraumatic.   Eyes:      Pupils: Pupils are equal, round, and reactive to light.   Neck:      Thyroid: No thyromegaly.      Trachea: Phonation normal.   Pulmonary:      Effort: No respiratory distress.   Skin:     Coloration: Skin is not pale.      Findings: No erythema.   Neurological:      Mental Status: She is alert.   Psychiatric:         Behavior: Behavior normal.         Thought Content: Thought content normal.         Judgment: Judgment normal.         Assessment/Plan    Diagnoses and all orders for this visit:    1. Type 2 diabetes mellitus with hyperglycemia, with long-term current use of insulin ([facility]) (Primary)  -     POC Glycosylated Hemoglobin (Hb A1C)  -     metFORMIN ER (GLUCOPHAGE-XR) 500 MG 24 hr tablet; Take 2 tablets by mouth 2 (Two) Times a Day.  Dispense: 360 tablet; Refill: 3  -     glimepiride (Amaryl) 4 MG tablet; Take 1 tablet by mouth Every Morning Before Breakfast.  Dispense: 30 tablet; Refill: 5  -     losartan (COZAAR) 50 MG tablet; Take 1 tablet by mouth Daily.  Dispense: 90 tablet; Refill: 3  -     empagliflozin (Jardiance) 25 MG tablet tablet; Take 1 tablet by mouth Daily.  Dispense: 30 tablet; Refill: 2  -     Insulin Glargine (LANTUS SOLOSTAR) 100 UNIT/ML injection pen; Inject 30 Units under the skin into the appropriate area as directed 2 (Two) Times a Day.  Dispense: 18 mL; Refill: 5  -     Semaglutide (Rybelsus) 7 MG tablet; Take 7 mg by mouth Daily.  Dispense: 30 tablet; Refill: 2  -     Continuous Blood Gluc Sensor (Dexcom G6 Sensor); Every 10 (Ten) Days.  Dispense: 3 each; Refill: 5  -     Continuous Blood Gluc  (Dexcom G6 ) device; 1 application 1 (One) Time for 1 dose.  Dispense: 1 each; Refill: 5    2. Hyperlipidemia, unspecified hyperlipidemia type  -      rosuvastatin (CRESTOR) 20 MG tablet; Take 1 tablet by mouth Daily.  Dispense: 90 tablet; Refill: 3    3. Essential hypertension  -     losartan (COZAAR) 50 MG tablet; Take 1 tablet by mouth Daily.  Dispense: 90 tablet; Refill: 3    Other orders  -     COVID-19 Bivalent Booster (Pfizer) 12+yrs      Unfortunately, Soledad's diabetes has gotten worse with an A1c now 10.2%.  I am going to discontinue the Soliqua.  Instead I am going to prescribe Lantus at her current dose of 30 units twice a day.  I am also going to start Rybelsus today.  3 mg for 1 month, then 7 mg.  We can uptitrate to 14 mg.  I gave her 3-month sample of Rybelsus 3 mg. We discussed possible side effects of the medication.  Continue maximum metformin, Amaryl, Jardiance.  Refills today.    Her blood pressure is well controlled we will continue her current regimen.    She received the COVID-19 booster vaccine today and tolerated this well without immediate side effects or issues.    Return in about 3 months (around 1/27/2023) for Recheck A1c.      AURORA Sarabia MD  09:06 CDT  10/27/2022

## 2022-10-27 NOTE — PATIENT INSTRUCTIONS
-Please let us know how your blood sugar is running. A good reading for now would be 100-200. Let me know if consistently >250 or <100

## 2022-12-13 ENCOUNTER — TELEPHONE (OUTPATIENT)
Dept: INTERNAL MEDICINE | Facility: CLINIC | Age: 62
End: 2022-12-13

## 2022-12-13 NOTE — TELEPHONE ENCOUNTER
Caller: Soledad aRm    Relationship: Self    Best call back number: [phone]    What medication are you requesting: SOMETHING TO HELP WITH SYMPTOMS    What are your current symptoms: PAIN IN EARS, COUGH, CONGESTION, GREEN/RED MUCUS     How long have you been experiencing symptoms: COUPLE OF DAYS    Have you had these symptoms before:    [x] Yes  [] No    Have you been treated for these symptoms before:   [x] Yes  [] No    If a prescription is needed, what is your preferred pharmacy and phone number: [facility] [address] [place] [address] [address] [facility] [phone] [facility] [phone]

## 2022-12-14 ENCOUNTER — OFFICE VISIT (OUTPATIENT)
Dept: INTERNAL MEDICINE | Facility: CLINIC | Age: 62
End: 2022-12-14

## 2022-12-14 VITALS
OXYGEN SATURATION: 96 % | DIASTOLIC BLOOD PRESSURE: 70 MMHG | HEIGHT: 66 IN | HEART RATE: 106 BPM | SYSTOLIC BLOOD PRESSURE: 124 MMHG | BODY MASS INDEX: 29.57 KG/M2 | WEIGHT: 184 LBS

## 2022-12-14 DIAGNOSIS — Z79.4 TYPE 2 DIABETES MELLITUS WITH HYPERGLYCEMIA, WITH LONG-TERM CURRENT USE OF INSULIN: ICD-10-CM

## 2022-12-14 DIAGNOSIS — I10 ESSENTIAL HYPERTENSION: ICD-10-CM

## 2022-12-14 DIAGNOSIS — E11.65 TYPE 2 DIABETES MELLITUS WITH HYPERGLYCEMIA, WITH LONG-TERM CURRENT USE OF INSULIN: ICD-10-CM

## 2022-12-14 DIAGNOSIS — E78.5 HYPERLIPIDEMIA, UNSPECIFIED HYPERLIPIDEMIA TYPE: ICD-10-CM

## 2022-12-14 DIAGNOSIS — J01.00 ACUTE MAXILLARY SINUSITIS, RECURRENCE NOT SPECIFIED: Primary | ICD-10-CM

## 2022-12-14 PROBLEM — M65.4 RADIAL STYLOID TENOSYNOVITIS OF RIGHT HAND: Status: ACTIVE | Noted: 2022-12-14

## 2022-12-14 PROCEDURE — 99214 OFFICE O/P EST MOD 30 MIN: CPT | Performed by: NURSE PRACTITIONER

## 2022-12-14 RX ORDER — LOSARTAN POTASSIUM 50 MG/1
50 TABLET ORAL DAILY
Qty: 90 TABLET | Refills: 3 | Status: SHIPPED | OUTPATIENT
Start: 2022-12-14

## 2022-12-14 RX ORDER — GLIMEPIRIDE 4 MG/1
4 TABLET ORAL
Qty: 90 TABLET | Refills: 3 | Status: SHIPPED | OUTPATIENT
Start: 2022-12-14

## 2022-12-14 RX ORDER — GUAIFENESIN 600 MG/1
1200 TABLET, EXTENDED RELEASE ORAL 2 TIMES DAILY
Qty: 40 TABLET | Refills: 0 | Status: SHIPPED | OUTPATIENT
Start: 2022-12-14

## 2022-12-14 RX ORDER — ROSUVASTATIN CALCIUM 20 MG/1
20 TABLET, COATED ORAL DAILY
Qty: 90 TABLET | Refills: 3 | Status: SHIPPED | OUTPATIENT
Start: 2022-12-14

## 2022-12-14 RX ORDER — AMOXICILLIN AND CLAVULANATE POTASSIUM 875; 125 MG/1; MG/1
1 TABLET, FILM COATED ORAL 2 TIMES DAILY
Qty: 14 TABLET | Refills: 0 | Status: SHIPPED | OUTPATIENT
Start: 2022-12-14 | End: 2023-01-31

## 2022-12-14 NOTE — PROGRESS NOTES
Chief Complaint   Patient presents with   • Cough   • Nasal Congestion   • Earache                History:  Soledad Ram is a 62 y.o. female who presents today for evaluation of the above problems.          3 weeks head congestion, sinus pressure getting worse. Dry cough, drainage in throat.  Yellow and bloody thick mucous coming from nose. Has Flonase at home and using regularly. Has also tried OTC cold products. Hurts into upper teeth.    Needs refills of losartan for hypertension.  Blood pressure running within goal.    Needs refill Amaryl for Type 2 diabetes. Watching diet very closely. Still hasn't gotten Rybelsus due to insurance coverage. I asked NANCY Hanks, to check on this today.    Needs refill Crestor for hyperlipidemia.  She is taking 20 mg daily and has not had any problems.      ROS:  Review of Systems  As above    Allergies   Allergen Reactions   • Lisinopril Swelling     FACE SWELLS   • Erythromycin Rash     Past Medical History:   Diagnosis Date   • Diabetes mellitus (HCC)    • Elevated cholesterol    • Hypertension    • Infection      Past Surgical History:   Procedure Laterality Date   • BACK SURGERY     • CARPAL TUNNEL RELEASE     • COLONOSCOPY N/A 4/18/2022    Procedure: COLONOSCOPY WITH ANESTHESIA;  Surgeon: Simba Rai DO;  Location: [facility] ENDOSCOPY;  Service: Gastroenterology;  Laterality: N/A;  pre altered bowel habits   post normal  KIRTI Sarabia MD       • TONSILLECTOMY     • TUBAL ABDOMINAL LIGATION       Family History   Problem Relation Age of Onset   • Lung cancer Mother    • Lung cancer Father    • Lung cancer Sister    • Heart disease Paternal Grandfather    • Breast cancer Neg Hx    • Colon cancer Neg Hx    • Colon polyps Neg Hx    • Esophageal cancer Neg Hx      Soledad  reports that she has never smoked. She has never used smokeless tobacco. She reports that she does not drink alcohol and does not use drugs.    I have reviewed and updated the above documentation (if  necessary) including but not limited to chief complaint, ROS, PFSH, allergies and medications        Current Outpatient Medications:   •  azelastine (ASTELIN) 0.1 % nasal spray, 2 sprays into the nostril(s) as directed by provider 2 (Two) Times a Day. Use in each nostril as directed, Disp: 1 each, Rfl: 12  •  cyclobenzaprine (FLEXERIL) 10 MG tablet, Take 10 mg by mouth 2 (Two) Times a Day As Needed for Muscle Spasms., Disp: , Rfl:   •  desonide (DESOWEN) 0.05 % cream, Apply 1 application topically to the appropriate area as directed 2 (Two) Times a Day., Disp: , Rfl:   •  DULoxetine (CYMBALTA) 60 MG capsule, Take 120 mg by mouth. Takes 2 capsules daily, Disp: , Rfl:   •  empagliflozin (Jardiance) 25 MG tablet tablet, Take 1 tablet by mouth Daily., Disp: 30 tablet, Rfl: 2  •  fluticasone (Flonase) 50 MCG/ACT nasal spray, 2 sprays into the nostril(s) as directed by provider Daily., Disp: 16 g, Rfl: 0  •  gabapentin (NEURONTIN) 600 MG tablet, Take 600 mg by mouth Every 8 (Eight) Hours As Needed., Disp: , Rfl:   •  losartan (COZAAR) 50 MG tablet, Take 1 tablet by mouth Daily., Disp: 90 tablet, Rfl: 3  •  metFORMIN ER (GLUCOPHAGE-XR) 500 MG 24 hr tablet, Take 2 tablets by mouth 2 (Two) Times a Day., Disp: 360 tablet, Rfl: 3  •  mometasone (ELOCON) 0.1 % cream, Apply  topically to the appropriate area as directed 2 (Two) Times a Day., Disp: , Rfl:   •  NON FORMULARY, [place] pain cream, Disp: , Rfl:   •  oxyCODONE-acetaminophen (PERCOCET)  MG per tablet, Take 1 tablet by mouth Every 6 (Six) Hours As Needed for Moderate Pain ., Disp: , Rfl:   •  polyethylene glycol (MIRALAX) 17 g packet, Take 17 g by mouth Daily., Disp: 30 packet, Rfl: 5  •  Symproic 0.2 MG tablet, Take 1 tablet by mouth Daily As Needed., Disp: , Rfl:   •  traZODone (DESYREL) 50 MG tablet, Take 3 tablets by mouth Every Night., Disp: 90 tablet, Rfl: 5  •  amoxicillin-clavulanate (Augmentin) 875-125 MG per tablet, Take 1 tablet by mouth 2 (Two)  "Times a Day., Disp: 14 tablet, Rfl: 0  •  Continuous Blood Gluc Sensor (Dexcom G6 Sensor), Every 10 (Ten) Days., Disp: 3 each, Rfl: 5  •  glimepiride (Amaryl) 4 MG tablet, Take 1 tablet by mouth Every Morning Before Breakfast., Disp: 90 tablet, Rfl: 3  •  guaiFENesin (Mucinex) 600 MG 12 hr tablet, Take 2 tablets by mouth 2 (Two) Times a Day. Take with full glass of water., Disp: 40 tablet, Rfl: 0  •  Insulin Glargine (LANTUS SOLOSTAR) 100 UNIT/ML injection pen, Inject 30 Units under the skin into the appropriate area as directed 2 (Two) Times a Day., Disp: 18 mL, Rfl: 5  •  rosuvastatin (Crestor) 20 MG tablet, Take 1 tablet by mouth Daily., Disp: 90 tablet, Rfl: 3  •  Semaglutide (Rybelsus) 7 MG tablet, Take 7 mg by mouth Daily., Disp: 30 tablet, Rfl: 2    OBJECTIVE:  Visit Vitals  /70 (BP Location: Right arm, Patient Position: Sitting, Cuff Size: Adult)   Pulse 106   Ht 167.6 cm (66\")   Wt 83.5 kg (184 lb)   SpO2 96%   BMI 29.70 kg/m²      Physical Exam  Vitals and nursing note reviewed.   Constitutional:       General: She is not in acute distress.     Appearance: Normal appearance. She is not ill-appearing, toxic-appearing or diaphoretic.   HENT:      Head: Normocephalic and atraumatic.      Right Ear: Ear canal and external ear normal. There is no impacted cerumen.      Left Ear: Ear canal and external ear normal. There is no impacted cerumen.      Ears:      Comments: TM's dull, no erythema.     Nose: Nose normal. No congestion or rhinorrhea.      Comments: Tender bilateral maxillary sinus regions.     Mouth/Throat:      Mouth: Mucous membranes are dry.      Pharynx: No oropharyngeal exudate or posterior oropharyngeal erythema.      Comments: Post-nasal drainage  Eyes:      General: No scleral icterus.        Right eye: No discharge.         Left eye: No discharge.      Conjunctiva/sclera: Conjunctivae normal.      Pupils: Pupils are equal, round, and reactive to light.   Cardiovascular:      Rate and " Rhythm: Normal rate and regular rhythm.   Pulmonary:      Effort: Pulmonary effort is normal. No respiratory distress.      Breath sounds: Normal breath sounds. No wheezing.   Abdominal:      General: There is no distension.      Palpations: Abdomen is soft.      Tenderness: There is no abdominal tenderness.   Musculoskeletal:      Cervical back: Neck supple.      Right lower leg: No edema.      Left lower leg: No edema.   Skin:     General: Skin is warm and dry.   Neurological:      Mental Status: She is alert and oriented to person, place, and time.   Psychiatric:         Mood and Affect: Mood normal.         Behavior: Behavior normal.         Thought Content: Thought content normal.         Judgment: Judgment normal.         [facility]    Assessment/Plan    Diagnoses and all orders for this visit:    1. Acute maxillary sinusitis, recurrence not specified (Primary)  -     amoxicillin-clavulanate (Augmentin) 875-125 MG per tablet; Take 1 tablet by mouth 2 (Two) Times a Day.  Dispense: 14 tablet; Refill: 0  -     guaiFENesin (Mucinex) 600 MG 12 hr tablet; Take 2 tablets by mouth 2 (Two) Times a Day. Take with full glass of water.  Dispense: 40 tablet; Refill: 0    2. Type 2 diabetes mellitus with hyperglycemia, with long-term current use of insulin (HCC)  -     losartan (COZAAR) 50 MG tablet; Take 1 tablet by mouth Daily.  Dispense: 90 tablet; Refill: 3  -     glimepiride (Amaryl) 4 MG tablet; Take 1 tablet by mouth Every Morning Before Breakfast.  Dispense: 90 tablet; Refill: 3    3. Essential hypertension  -     losartan (COZAAR) 50 MG tablet; Take 1 tablet by mouth Daily.  Dispense: 90 tablet; Refill: 3    4. Hyperlipidemia, unspecified hyperlipidemia type  -     rosuvastatin (Crestor) 20 MG tablet; Take 1 tablet by mouth Daily.  Dispense: 90 tablet; Refill: 3      Treat sinusitis as above. Avoid steroids at this time due to diabetes but continue Flonase and let us know if the pain and pressure is not improved with  treatment.    Other medications refilled as above. Blood pressure within goal. Discussed diet related to diabetes.  NANCY Hanks, is going to call patient back after she looks into the Rybelsus prescription.    Education materials and an After Visit Summary were printed and given to the patient at discharge.    Return if symptoms worsen or fail to improve, for Next scheduled follow up.         Gracia Tran, MARTY   10:02 CST  12/14/2022

## 2023-01-06 ENCOUNTER — TELEPHONE (OUTPATIENT)
Dept: INTERNAL MEDICINE | Facility: CLINIC | Age: 63
End: 2023-01-06
Payer: MEDICARE

## 2023-01-06 NOTE — TELEPHONE ENCOUNTER
Patient said that in order for her insurance to pay for dexcom, they need a letter stating that she needs dexcom.

## 2023-01-31 ENCOUNTER — LAB REQUISITION (OUTPATIENT)
Dept: LAB | Facility: HOSPITAL | Age: 63
End: 2023-01-31
Payer: MEDICARE

## 2023-01-31 ENCOUNTER — OFFICE VISIT (OUTPATIENT)
Dept: INTERNAL MEDICINE | Facility: CLINIC | Age: 63
End: 2023-01-31
Payer: MEDICARE

## 2023-01-31 VITALS
DIASTOLIC BLOOD PRESSURE: 68 MMHG | HEIGHT: 66 IN | BODY MASS INDEX: 28.61 KG/M2 | HEART RATE: 90 BPM | SYSTOLIC BLOOD PRESSURE: 106 MMHG | OXYGEN SATURATION: 94 % | WEIGHT: 178 LBS

## 2023-01-31 DIAGNOSIS — J06.9 VIRAL URI WITH COUGH: ICD-10-CM

## 2023-01-31 DIAGNOSIS — Z79.4 TYPE 2 DIABETES MELLITUS WITH HYPERGLYCEMIA, WITH LONG-TERM CURRENT USE OF INSULIN: Primary | ICD-10-CM

## 2023-01-31 DIAGNOSIS — Z00.00 ENCOUNTER FOR GENERAL ADULT MEDICAL EXAMINATION WITHOUT ABNORMAL FINDINGS: ICD-10-CM

## 2023-01-31 DIAGNOSIS — E11.65 TYPE 2 DIABETES MELLITUS WITH HYPERGLYCEMIA, WITH LONG-TERM CURRENT USE OF INSULIN: Primary | ICD-10-CM

## 2023-01-31 DIAGNOSIS — U07.1 COVID-19 VIRUS DETECTED: Primary | ICD-10-CM

## 2023-01-31 LAB
EXPIRATION DATE: ABNORMAL
FLUAV RNA RESP QL NAA+PROBE: NOT DETECTED
FLUBV RNA RESP QL NAA+PROBE: NOT DETECTED
HBA1C MFR BLD: 10.6 %
Lab: ABNORMAL
SARS-COV-2 RNA RESP QL NAA+PROBE: DETECTED

## 2023-01-31 PROCEDURE — 83036 HEMOGLOBIN GLYCOSYLATED A1C: CPT | Performed by: INTERNAL MEDICINE

## 2023-01-31 PROCEDURE — 99214 OFFICE O/P EST MOD 30 MIN: CPT | Performed by: INTERNAL MEDICINE

## 2023-01-31 PROCEDURE — 3046F HEMOGLOBIN A1C LEVEL >9.0%: CPT | Performed by: INTERNAL MEDICINE

## 2023-01-31 PROCEDURE — 87636 SARSCOV2 & INF A&B AMP PRB: CPT | Performed by: INTERNAL MEDICINE

## 2023-01-31 RX ORDER — BENZONATATE 100 MG/1
100 CAPSULE ORAL 3 TIMES DAILY PRN
Qty: 60 CAPSULE | Refills: 0 | Status: SHIPPED | OUTPATIENT
Start: 2023-01-31 | End: 2023-03-01

## 2023-01-31 RX ORDER — ORAL SEMAGLUTIDE 7 MG/1
7 TABLET ORAL DAILY
Qty: 30 TABLET | Refills: 2 | Status: SHIPPED | OUTPATIENT
Start: 2023-01-31

## 2023-01-31 NOTE — PATIENT INSTRUCTIONS
-start Lantus 40 units twice a day tomorrow (2/1/23)  -start Rybelsus 3mg for one month then 7 mg.   -Let me know if you have trouble with insurance covering your medications

## 2023-01-31 NOTE — PROGRESS NOTES
"Chief Complaint   Patient presents with   • Diabetes         History:  Soledad Ram is a 63 y.o. female who presents today for evaluation of the above problems.      Soledad presents today for 3-month follow-up on her diabetes.  Her last A1c was 10.2% on October 27, 2022, and is 10.6% today.  Her insurance did not cover Lantus Solostar so she went back to the Soliqua 30 units twice a day.  Her insurance also did not cover Rybelsus.  She remains on metformin 1000 mg twice a day and Jardiance 25 mg daily as well as Amaryl 4 mg in the morning.    She will be changing over to [employer] insurance and starting February 1 and she states her medications will be covered with her new insurance    She has an acute issue over the last 3 days with cough, decreased sleep related to the cough and feeling tired and fatigued.  She has had decreased appetite and chest tightness when she coughs.  There is no fever, chills or shortness of breath.  She feels like there is drainage which she contributes to her cough.  She denies any sick contacts although her grandchildren have had \"snotty noses\".  She has not yet taken a home COVID-19 test    HPI      ROS:  Review of Systems    As above       Current Outpatient Medications:   •  azelastine (ASTELIN) 0.1 % nasal spray, 2 sprays into the nostril(s) as directed by provider 2 (Two) Times a Day. Use in each nostril as directed, Disp: 1 each, Rfl: 12  •  cyclobenzaprine (FLEXERIL) 10 MG tablet, Take 10 mg by mouth 2 (Two) Times a Day As Needed for Muscle Spasms., Disp: , Rfl:   •  desonide (DESOWEN) 0.05 % cream, Apply 1 application topically to the appropriate area as directed 2 (Two) Times a Day., Disp: , Rfl:   •  DULoxetine (CYMBALTA) 60 MG capsule, Take 120 mg by mouth. Takes 2 capsules daily, Disp: , Rfl:   •  empagliflozin (Jardiance) 25 MG tablet tablet, Take 1 tablet by mouth Daily., Disp: 30 tablet, Rfl: 2  •  fluticasone (Flonase) 50 MCG/ACT nasal spray, 2 sprays into the nostril(s) " as directed by provider Daily., Disp: 16 g, Rfl: 0  •  gabapentin (NEURONTIN) 600 MG tablet, Take 600 mg by mouth Every 8 (Eight) Hours As Needed., Disp: , Rfl:   •  glimepiride (Amaryl) 4 MG tablet, Take 1 tablet by mouth Every Morning Before Breakfast., Disp: 90 tablet, Rfl: 3  •  guaiFENesin (Mucinex) 600 MG 12 hr tablet, Take 2 tablets by mouth 2 (Two) Times a Day. Take with full glass of water., Disp: 40 tablet, Rfl: 0  •  Insulin Glargine (LANTUS SOLOSTAR) 100 UNIT/ML injection pen, Inject 40 Units under the skin into the appropriate area as directed 2 (Two) Times a Day., Disp: 18 mL, Rfl: 5  •  losartan (COZAAR) 50 MG tablet, Take 1 tablet by mouth Daily., Disp: 90 tablet, Rfl: 3  •  metFORMIN ER (GLUCOPHAGE-XR) 500 MG 24 hr tablet, Take 2 tablets by mouth 2 (Two) Times a Day., Disp: 360 tablet, Rfl: 3  •  mometasone (ELOCON) 0.1 % cream, Apply  topically to the appropriate area as directed 2 (Two) Times a Day., Disp: , Rfl:   •  NON FORMULARY, [place] pain cream, Disp: , Rfl:   •  oxyCODONE-acetaminophen (PERCOCET)  MG per tablet, Take 1 tablet by mouth Every 6 (Six) Hours As Needed for Moderate Pain ., Disp: , Rfl:   •  polyethylene glycol (MIRALAX) 17 g packet, Take 17 g by mouth Daily., Disp: 30 packet, Rfl: 5  •  rosuvastatin (Crestor) 20 MG tablet, Take 1 tablet by mouth Daily., Disp: 90 tablet, Rfl: 3  •  Semaglutide (Rybelsus) 7 MG tablet, Take 7 mg by mouth Daily., Disp: 30 tablet, Rfl: 2  •  Symproic 0.2 MG tablet, Take 1 tablet by mouth Daily As Needed., Disp: , Rfl:   •  traZODone (DESYREL) 50 MG tablet, Take 3 tablets by mouth Every Night., Disp: 90 tablet, Rfl: 5  •  benzonatate (Tessalon Perles) 100 MG capsule, Take 1 capsule by mouth 3 (Three) Times a Day As Needed for Cough., Disp: 60 capsule, Rfl: 0    Lab Results   Component Value Date    GLUCOSE 211 (H) 02/23/2022    BUN 12 02/23/2022    CREATININE 0.76 02/23/2022    EGFRIFNONA 78 02/23/2022    BCR 15.8 02/23/2022    K 4.4  02/23/2022    CO2 27.0 02/23/2022    CALCIUM 9.8 02/23/2022    ALBUMIN 4.50 02/23/2022    AST 48 (H) 02/23/2022    ALT 30 02/23/2022       WBC   Date Value Ref Range Status   02/23/2022 10.10 3.40 - 10.80 10*3/mm3 Final     RBC   Date Value Ref Range Status   02/23/2022 5.08 3.77 - 5.28 10*6/mm3 Final     Hemoglobin   Date Value Ref Range Status   02/23/2022 15.3 12.0 - 15.9 g/dL Final     Hematocrit   Date Value Ref Range Status   02/23/2022 47.1 (H) 34.0 - 46.6 % Final     MCV   Date Value Ref Range Status   02/23/2022 92.7 79.0 - 97.0 fL Final     MCH   Date Value Ref Range Status   02/23/2022 30.1 26.6 - 33.0 pg Final     MCHC   Date Value Ref Range Status   02/23/2022 32.5 31.5 - 35.7 g/dL Final     RDW   Date Value Ref Range Status   02/23/2022 13.2 12.3 - 15.4 % Final     RDW-SD   Date Value Ref Range Status   02/23/2022 45.3 37.0 - 54.0 fl Final     MPV   Date Value Ref Range Status   02/23/2022 11.6 6.0 - 12.0 fL Final     Platelets   Date Value Ref Range Status   02/23/2022 249 140 - 450 10*3/mm3 Final     Neutrophil %   Date Value Ref Range Status   02/23/2022 52.6 42.7 - 76.0 % Final     Lymphocyte %   Date Value Ref Range Status   02/23/2022 27.5 19.6 - 45.3 % Final     Monocyte %   Date Value Ref Range Status   02/23/2022 8.0 5.0 - 12.0 % Final     Eosinophil %   Date Value Ref Range Status   02/23/2022 10.4 (H) 0.3 - 6.2 % Final     Basophil %   Date Value Ref Range Status   02/23/2022 1.2 0.0 - 1.5 % Final     Immature Grans %   Date Value Ref Range Status   02/23/2022 0.3 0.0 - 0.5 % Final     Neutrophils, Absolute   Date Value Ref Range Status   02/23/2022 5.31 1.70 - 7.00 10*3/mm3 Final     Lymphocytes, Absolute   Date Value Ref Range Status   02/23/2022 2.78 0.70 - 3.10 10*3/mm3 Final     Monocytes, Absolute   Date Value Ref Range Status   02/23/2022 0.81 0.10 - 0.90 10*3/mm3 Final     Eosinophils, Absolute   Date Value Ref Range Status   02/23/2022 1.05 (H) 0.00 - 0.40 10*3/mm3 Final  "    Basophils, Absolute   Date Value Ref Range Status   02/23/2022 0.12 0.00 - 0.20 10*3/mm3 Final     Immature Grans, Absolute   Date Value Ref Range Status   02/23/2022 0.03 0.00 - 0.05 10*3/mm3 Final     nRBC   Date Value Ref Range Status   02/23/2022 0.0 0.0 - 0.2 /100 WBC Final         OBJECTIVE:  Visit Vitals  /68 (BP Location: Right arm, Patient Position: Sitting, Cuff Size: Adult)   Pulse 90   Ht 167.6 cm (66\")   Wt 80.7 kg (178 lb)   SpO2 94%   BMI 28.73 kg/m²      Physical Exam  Vitals and nursing note reviewed.   Constitutional:       General: She is not in acute distress.     Appearance: Normal appearance. She is well-developed. She is not diaphoretic.      Comments: Pleasant      HENT:      Head: Normocephalic and atraumatic.   Eyes:      Pupils: Pupils are equal, round, and reactive to light.   Neck:      Thyroid: No thyromegaly.      Vascular: No carotid bruit.      Trachea: Phonation normal.   Cardiovascular:      Rate and Rhythm: Normal rate and regular rhythm.      Heart sounds: No murmur heard.  Pulmonary:      Effort: No respiratory distress.      Breath sounds: No wheezing or rales.   Skin:     Coloration: Skin is not jaundiced or pale.      Findings: No erythema.   Neurological:      Mental Status: She is alert.   Psychiatric:         Behavior: Behavior normal.         Thought Content: Thought content normal.         Judgment: Judgment normal.         Assessment/Plan    Diagnoses and all orders for this visit:    1. Type 2 diabetes mellitus with hyperglycemia, with long-term current use of insulin (HCC) (Primary)  -     POC Glycosylated Hemoglobin (Hb A1C)  -     Insulin Glargine (LANTUS SOLOSTAR) 100 UNIT/ML injection pen; Inject 40 Units under the skin into the appropriate area as directed 2 (Two) Times a Day.  Dispense: 18 mL; Refill: 5  -     Semaglutide (Rybelsus) 7 MG tablet; Take 7 mg by mouth Daily.  Dispense: 30 tablet; Refill: 2    2. Viral URI with cough  -     COVID PRE-OP / " PRE-PROCEDURE SCREENING ORDER (NO ISOLATION) - Swab, Nasopharynx; Future  -     benzonatate (Tessalon Perles) 100 MG capsule; Take 1 capsule by mouth 3 (Three) Times a Day As Needed for Cough.  Dispense: 60 capsule; Refill: 0      Unfortunately, Soledad's diabetes has gotten worse with an A1c now 10.6%.   At the last visit I discontinued her Soliqua and prescribed Lantus 30 units twice a day.  Unfortunately we do not know her insurance did not cover the Lantus and she was still taking the Soliqua.  Additionally, her insurance would not cover Rybelsus but we were unaware.  She now has a new insurance that will take effect on February 1.  I have sent over updated prescription of Lantus at 40 units twice a day and she received another 3 mg sample of Rybelsus for the first month and I have prescribed 7 mg of Rybelsus to her pharmacy.  She reassures me that insurance will cover these medications.    Continue maximum metformin, Amaryl, Jardiance.      We will test for COVID-19 and influenza based on her symptoms.  If she is positive for COVID-19 she is interested in Paxlovid.  I discussed EUA with her today.  Prescribed Tessalon for symptoms of cough.    Follow-up in 3 months with repeat A1c.  Follow-up sooner if indicated.    Return in about 3 months (around 4/30/2023) for Recheck A1c.      AURORA Sarabia MD  09:06 CDT  1/31/2023

## 2023-02-07 ENCOUNTER — TELEPHONE (OUTPATIENT)
Dept: INTERNAL MEDICINE | Facility: CLINIC | Age: 63
End: 2023-02-07

## 2023-02-07 DIAGNOSIS — Z79.4 TYPE 2 DIABETES MELLITUS WITH HYPERGLYCEMIA, WITH LONG-TERM CURRENT USE OF INSULIN: Primary | ICD-10-CM

## 2023-02-07 DIAGNOSIS — E11.65 TYPE 2 DIABETES MELLITUS WITH HYPERGLYCEMIA, WITH LONG-TERM CURRENT USE OF INSULIN: Primary | ICD-10-CM

## 2023-02-07 RX ORDER — PROCHLORPERAZINE 25 MG/1
1 SUPPOSITORY RECTAL
Qty: 1 EACH | Refills: 5 | Status: SHIPPED | OUTPATIENT
Start: 2023-02-07 | End: 2023-03-07 | Stop reason: SDUPTHER

## 2023-02-07 RX ORDER — PROCHLORPERAZINE 25 MG/1
1 SUPPOSITORY RECTAL ONCE
Qty: 1 EACH | Refills: 3 | Status: SHIPPED | OUTPATIENT
Start: 2023-02-07 | End: 2023-02-07

## 2023-02-07 NOTE — TELEPHONE ENCOUNTER
If her blood sugar remains controlled she can remain off of her metformin.  I have sent over a prescription for the Dexcom to [facility] pharmacy.

## 2023-02-07 NOTE — TELEPHONE ENCOUNTER
PATIENT HAS CALLED, SHE STATE THAT THE DEXCOM WAS TO BE CALLED IN TO HER PHARMACY BUT WHEN SHE WENT TO PICK IT UP THE PHARMACY TOLD HER IT HAD NOT BEEN CALLED IN AS OF YET.   SHE WAS SEEN ON 01/31/2023    [phone]    PATIENT HAS STATED THAT SHE RAN OUT OF METFORMIN, SHE STATED THAT USED HER RYBELSUS AND LANTUS SOLOSTAR, GLIMEPIRIDE AND JARDIANCE.  SHE STATED THAT HER BS HAS BEEN 83 .    SHE IS ASKING SINCE IF SHE COULD BE TAKEN OFF THE METFORMIN.  SHE HAS NOT HAD IT SINCE SHE RAN OUT.    SHE STATED THAT SHE FEELS SO MUCH BETTER SINCE SHE HAS NOT BEEN TAKING IT AND HAS ENERGY.

## 2023-02-07 NOTE — TELEPHONE ENCOUNTER
PATIENT HAS BEEN CALLED, GIVEN MESSAGE AND UNDERSTANDING VOICED.    SHE STATED THAT SHE WOULD CALL HER [facility] REP TO SEE WHAT   IS GOING ON WITH THE DENIAL LETTER THAT WAS RECEIVED HERE   AT THIS OFFICE REGARDING HER INSULIN.

## 2023-03-01 ENCOUNTER — HOSPITAL ENCOUNTER (OUTPATIENT)
Dept: GENERAL RADIOLOGY | Facility: HOSPITAL | Age: 63
Discharge: HOME | End: 2023-03-01
Payer: MEDICARE

## 2023-03-01 PROCEDURE — 71046 X-RAY EXAM CHEST 2 VIEWS: CPT

## 2023-03-07 ENCOUNTER — OFFICE VISIT (OUTPATIENT)
Dept: INTERNAL MEDICINE | Facility: CLINIC | Age: 63
End: 2023-03-07
Payer: MEDICARE

## 2023-03-07 VITALS
OXYGEN SATURATION: 98 % | SYSTOLIC BLOOD PRESSURE: 130 MMHG | HEIGHT: 66 IN | HEART RATE: 94 BPM | WEIGHT: 179.2 LBS | TEMPERATURE: 98 F | BODY MASS INDEX: 28.8 KG/M2 | RESPIRATION RATE: 16 BRPM | DIASTOLIC BLOOD PRESSURE: 70 MMHG

## 2023-03-07 DIAGNOSIS — E11.65 TYPE 2 DIABETES MELLITUS WITH HYPERGLYCEMIA, WITH LONG-TERM CURRENT USE OF INSULIN: ICD-10-CM

## 2023-03-07 DIAGNOSIS — J18.9 PNEUMONIA OF RIGHT LOWER LOBE DUE TO INFECTIOUS ORGANISM: Primary | ICD-10-CM

## 2023-03-07 DIAGNOSIS — Z79.4 TYPE 2 DIABETES MELLITUS WITH HYPERGLYCEMIA, WITH LONG-TERM CURRENT USE OF INSULIN: ICD-10-CM

## 2023-03-07 PROCEDURE — 3075F SYST BP GE 130 - 139MM HG: CPT | Performed by: NURSE PRACTITIONER

## 2023-03-07 PROCEDURE — 99214 OFFICE O/P EST MOD 30 MIN: CPT | Performed by: NURSE PRACTITIONER

## 2023-03-07 PROCEDURE — 3078F DIAST BP <80 MM HG: CPT | Performed by: NURSE PRACTITIONER

## 2023-03-07 PROCEDURE — 3046F HEMOGLOBIN A1C LEVEL >9.0%: CPT | Performed by: NURSE PRACTITIONER

## 2023-03-07 RX ORDER — LEVOFLOXACIN 500 MG/1
500 TABLET, FILM COATED ORAL DAILY
Qty: 7 TABLET | Refills: 0 | Status: SHIPPED | OUTPATIENT
Start: 2023-03-07

## 2023-03-07 RX ORDER — DEXTROMETHORPHAN HYDROBROMIDE AND PROMETHAZINE HYDROCHLORIDE 15; 6.25 MG/5ML; MG/5ML
5 SYRUP ORAL 4 TIMES DAILY PRN
Qty: 240 ML | Refills: 0 | Status: SHIPPED | OUTPATIENT
Start: 2023-03-07

## 2023-03-07 RX ORDER — PROCHLORPERAZINE 25 MG/1
1 SUPPOSITORY RECTAL DAILY
Qty: 1 EACH | Refills: 5 | Status: SHIPPED | OUTPATIENT
Start: 2023-03-07

## 2023-03-07 RX ORDER — TRAZODONE HYDROCHLORIDE 50 MG/1
150 TABLET ORAL NIGHTLY
Qty: 90 TABLET | Refills: 5 | Status: SHIPPED | OUTPATIENT
Start: 2023-03-07

## 2023-03-07 RX ORDER — PROCHLORPERAZINE 25 MG/1
SUPPOSITORY RECTAL
Qty: 4 EACH | Refills: 11 | Status: SHIPPED | OUTPATIENT
Start: 2023-03-07

## 2023-03-07 RX ORDER — PROCHLORPERAZINE 25 MG/1
1 SUPPOSITORY RECTAL
Qty: 1 EACH | Refills: 5 | Status: SHIPPED | OUTPATIENT
Start: 2023-03-07

## 2023-03-07 NOTE — PROGRESS NOTES
Chief Complaint   Patient presents with   • Cough         History:  Soledad Ram is a 63 y.o. female who presents today for evaluation of the above problems.          Seen 3/1/23 at  for RLL pneumonitis. Treated with Augmentin and Proventil, pharmacy did not have Bromphed.  She has not gotten better and feels worse. Fever still, not over 100.  Had Covid in January, got better.  Has taken 2 repeat Covid tests since this current illness, negative.    No chest pain, no wheezing.  No shortness of breath, no swelling.  Coughing a lot, worse at night. Eyes feel matted, throat hurts, chest congested but cough dry.    She has not been able to get the Dexcom that was prescribed, pharmacy says they don't have. States her glucose has not been running low, not too high.      ROS:  Review of Systems  As above    Allergies   Allergen Reactions   • Lisinopril Swelling     FACE SWELLS   • Erythromycin Rash     Past Medical History:   Diagnosis Date   • Diabetes mellitus (HCC)    • Elevated cholesterol    • Hypertension    • Infection      Past Surgical History:   Procedure Laterality Date   • BACK SURGERY     • CARPAL TUNNEL RELEASE     • COLONOSCOPY N/A 4/18/2022    Procedure: COLONOSCOPY WITH ANESTHESIA;  Surgeon: Simba Rai DO;  Location: [facility] ENDOSCOPY;  Service: Gastroenterology;  Laterality: N/A;  pre altered bowel habits   post normal  KIRTI Sarabia MD       • TONSILLECTOMY     • TUBAL ABDOMINAL LIGATION       Family History   Problem Relation Age of Onset   • Lung cancer Mother    • Lung cancer Father    • Lung cancer Sister    • Heart disease Paternal Grandfather    • Breast cancer Neg Hx    • Colon cancer Neg Hx    • Colon polyps Neg Hx    • Esophageal cancer Neg Hx      Soledad  reports that she has never smoked. She has never used smokeless tobacco. She reports that she does not drink alcohol and does not use drugs.    I have reviewed and updated the above documentation (if necessary) including but  not limited to chief complaint, ROS, PFSH, allergies and medications        Current Outpatient Medications:   •  albuterol sulfate  (90 Base) MCG/ACT inhaler, Inhale 2 puffs Every 4 (Four) Hours As Needed for Wheezing or Shortness of Air (rinse mouth after use)., Disp: 6.7 g, Rfl: 0  •  Continuous Blood Gluc Transmit (Dexcom G6 Transmitter) misc, 1 application Every 10 (Ten) Days., Disp: 1 each, Rfl: 5  •  cyclobenzaprine (FLEXERIL) 10 MG tablet, Take 1 tablet by mouth 2 (Two) Times a Day As Needed for Muscle Spasms., Disp: , Rfl:   •  desonide (DESOWEN) 0.05 % cream, Apply 1 application topically to the appropriate area as directed 2 (Two) Times a Day., Disp: , Rfl:   •  DULoxetine (CYMBALTA) 60 MG capsule, Take 2 capsules by mouth. Takes 2 capsules daily, Disp: , Rfl:   •  empagliflozin (Jardiance) 25 MG tablet tablet, Take 1 tablet by mouth Daily., Disp: 30 tablet, Rfl: 2  •  gabapentin (NEURONTIN) 600 MG tablet, Take 1 tablet by mouth Every 8 (Eight) Hours As Needed., Disp: , Rfl:   •  glimepiride (Amaryl) 4 MG tablet, Take 1 tablet by mouth Every Morning Before Breakfast., Disp: 90 tablet, Rfl: 3  •  Insulin Glargine (LANTUS SOLOSTAR) 100 UNIT/ML injection pen, Inject 40 Units under the skin into the appropriate area as directed 2 (Two) Times a Day., Disp: 18 mL, Rfl: 5  •  losartan (COZAAR) 50 MG tablet, Take 1 tablet by mouth Daily., Disp: 90 tablet, Rfl: 3  •  oxyCODONE-acetaminophen (PERCOCET)  MG per tablet, Take 1 tablet by mouth Every 6 (Six) Hours As Needed for Moderate Pain., Disp: , Rfl:   •  polyethylene glycol (MIRALAX) 17 g packet, Take 17 g by mouth Daily., Disp: 30 packet, Rfl: 5  •  rosuvastatin (Crestor) 20 MG tablet, Take 1 tablet by mouth Daily., Disp: 90 tablet, Rfl: 3  •  Semaglutide (Rybelsus) 7 MG tablet, Take 7 mg by mouth Daily., Disp: 30 tablet, Rfl: 2  •  traZODone (DESYREL) 50 MG tablet, Take 3 tablets by mouth Every Night., Disp: 90 tablet, Rfl: 5  •  Continuous Blood  "Gluc  (Dexcom G6 ) device, 1 each Daily., Disp: 1 each, Rfl: 5  •  Continuous Blood Gluc Sensor (Dexcom G6 Sensor), Every 10 (Ten) Days., Disp: 4 each, Rfl: 11  •  fluticasone (Flonase) 50 MCG/ACT nasal spray, 2 sprays into the nostril(s) as directed by provider Daily., Disp: 16 g, Rfl: 0  •  guaiFENesin (Mucinex) 600 MG 12 hr tablet, Take 2 tablets by mouth 2 (Two) Times a Day. Take with full glass of water., Disp: 40 tablet, Rfl: 0  •  levoFLOXacin (Levaquin) 500 MG tablet, Take 1 tablet by mouth Daily., Disp: 7 tablet, Rfl: 0  •  promethazine-dextromethorphan (PROMETHAZINE-DM) 6.25-15 MG/5ML syrup, Take 5 mL by mouth 4 (Four) Times a Day As Needed for Cough., Disp: 240 mL, Rfl: 0    OBJECTIVE:  Visit Vitals  /70 (BP Location: Left arm, Patient Position: Sitting, Cuff Size: Adult)   Pulse 94   Temp 98 °F (36.7 °C)   Resp 16   Ht 167.6 cm (66\")   Wt 81.3 kg (179 lb 3.2 oz)   SpO2 98%   BMI 28.92 kg/m²      Physical Exam  Vitals and nursing note reviewed.   Constitutional:       General: She is not in acute distress.     Appearance: She is ill-appearing. She is not toxic-appearing or diaphoretic.   HENT:      Head: Normocephalic and atraumatic.      Right Ear: Ear canal and external ear normal. There is no impacted cerumen.      Left Ear: Ear canal and external ear normal. There is no impacted cerumen.      Ears:      Comments: Both TM's dull     Nose: No congestion.      Mouth/Throat:      Pharynx: No oropharyngeal exudate or posterior oropharyngeal erythema.   Eyes:      General:         Right eye: Discharge (watery) present.         Left eye: Discharge (watery) present.     Conjunctiva/sclera: Conjunctivae normal.      Pupils: Pupils are equal, round, and reactive to light.   Cardiovascular:      Rate and Rhythm: Normal rate and regular rhythm.   Pulmonary:      Effort: Pulmonary effort is normal. No respiratory distress.      Breath sounds: Normal breath sounds. No stridor. No wheezing, " rhonchi or rales.   Chest:      Chest wall: Tenderness present.   Abdominal:      General: There is no distension.      Palpations: Abdomen is soft.      Tenderness: There is no abdominal tenderness.   Musculoskeletal:      Cervical back: Neck supple.      Right lower leg: No edema.      Left lower leg: No edema.   Lymphadenopathy:      Cervical: No cervical adenopathy.   Skin:     General: Skin is warm and dry.   Neurological:      Mental Status: She is alert and oriented to person, place, and time.   Psychiatric:         Mood and Affect: Mood normal.         Behavior: Behavior normal.         Thought Content: Thought content normal.         Judgment: Judgment normal.     I reviewed the CXR report from 3/1/23.    MDM    Assessment/Plan    Diagnoses and all orders for this visit:    1. Pneumonia of right lower lobe due to infectious organism (Primary)  -     levoFLOXacin (Levaquin) 500 MG tablet; Take 1 tablet by mouth Daily.  Dispense: 7 tablet; Refill: 0  -     promethazine-dextromethorphan (PROMETHAZINE-DM) 6.25-15 MG/5ML syrup; Take 5 mL by mouth 4 (Four) Times a Day As Needed for Cough.  Dispense: 240 mL; Refill: 0    2. Type 2 diabetes mellitus with hyperglycemia, with long-term current use of insulin (HCC)  -     empagliflozin (Jardiance) 25 MG tablet tablet; Take 1 tablet by mouth Daily.  Dispense: 30 tablet; Refill: 2  -     Continuous Blood Gluc Transmit (Dexcom G6 Transmitter) misc; 1 application Every 10 (Ten) Days.  Dispense: 1 each; Refill: 5  -     Continuous Blood Gluc Sensor (Dexcom G6 Sensor); Every 10 (Ten) Days.  Dispense: 4 each; Refill: 11  -     Continuous Blood Gluc  (Dexcom G6 ) device; 1 each Daily.  Dispense: 1 each; Refill: 5    Other orders  -     traZODone (DESYREL) 50 MG tablet; Take 3 tablets by mouth Every Night.  Dispense: 90 tablet; Refill: 5      Treat for atypical organisms with Levaquin.  She is allergic to erythromycin, so will not choose Z-pack.  Continue  albuterol PRN. I do not think steroids are clinically indicated at this time.      Refill Jardiance and re-prescribe the Dexcom system.    She also needs trazodone refill.        Education materials and an After Visit Summary were printed and given to the patient at discharge.  Return if symptoms worsen or fail to improve, for Next scheduled follow up.         Gracia Tran, APRN   14:10 CST  3/7/2023

## 2023-03-09 ENCOUNTER — TELEPHONE (OUTPATIENT)
Dept: INTERNAL MEDICINE | Facility: CLINIC | Age: 63
End: 2023-03-09
Payer: MEDICARE

## 2023-03-09 NOTE — TELEPHONE ENCOUNTER
Caller: LISBET    Relationship: [facility] call back number: [phone]    What form or medical record are you requesting: DEXCOM G6     Who is requesting this form or medical record from you: PRIOR AUTHORIZATION         Additional notes: LISBET HAS ABOUT 17 CLINICAL QUESTIONS TO  GET ANSWERED

## 2023-03-14 ENCOUNTER — TELEPHONE (OUTPATIENT)
Dept: INTERNAL MEDICINE | Facility: CLINIC | Age: 63
End: 2023-03-14

## 2023-03-15 ENCOUNTER — TELEPHONE (OUTPATIENT)
Dept: INTERNAL MEDICINE | Facility: CLINIC | Age: 63
End: 2023-03-15

## 2023-03-15 NOTE — TELEPHONE ENCOUNTER
Caller: SIOBHAN    Relationship: Other WITH [facility]    Best call back number: [phone]    What is the best time to reach you: ANYTIME    Who are you requesting to speak with (clinical staff, provider,  specific staff member): CLINICAL      What was the call regarding: CALLING TO STATE THAT THERE IS ALREADY A PRIOR AUTHORIZATION FOR THE DEX COM ON FILE. THEY ARE GOING TO CLOSE OUT THE ONE THAT IS OPEN FOR REVIEW    Do you require a callback: NO

## 2023-03-21 ENCOUNTER — PATIENT OUTREACH (OUTPATIENT)
Dept: CASE MANAGEMENT | Facility: OTHER | Age: 63
End: 2023-03-21
Payer: MEDICARE

## 2023-03-21 NOTE — OUTREACH NOTE
AMBULATORY CASE MANAGEMENT NOTE    Name and Relationship of Patient/Support Person: Soledad Ram R - Self    Patient Outreach    Proactive outreach with Enzo CRUZ patient with an abnormal Hgb A1C.    Adult Patient Profile  Questions/Answers    Flowsheet Row Most Recent Value   Symptoms/Conditions Managed at Home diabetes, type 2   Diabetes Management Strategies medication therapy, routine screenings, insulin therapy, blood glucose testing   Frequency of Blood Glucose Testing continuous   A1C Target 7.0   Last A1C Result 10.6 on 1.31.23   Diabetes Comment patient just received Dexcom today. She is agreeable for a return call next week to discuss continuation of diabetes management.   Missed Doses of Prescribed Medications During Past Week no   Taken Prescribed Medications at Different Time or Schedule During Past Week no   Taken More or Less Medication Than Prescribed no   Barriers to Taking Medication as Prescribed none            Alexandrea MOCTEZUMA  Ambulatory Case Management    3/21/2023, 12:58 CDT

## 2023-03-31 ENCOUNTER — PATIENT OUTREACH (OUTPATIENT)
Dept: CASE MANAGEMENT | Facility: OTHER | Age: 63
End: 2023-03-31
Payer: MEDICARE

## 2023-03-31 NOTE — OUTREACH NOTE
AMBULATORY CASE MANAGEMENT NOTE    Name and Relationship of Patient/Support Person: Soledad Ram R - Self    Patient Outreach    HRCM follow up. Patient has not been able to figure out how to setup her Dexcom. She will contact PCP office to see if they can provide instruction or possibly a diabetic educator referral . ACM suggested reviewing her literature to see if there is a support number listed for setup assistance.         Alexandrea MOCTEZUMA  Ambulatory Case Management    3/31/2023, 13:31 CDT

## 2023-04-18 ENCOUNTER — PATIENT OUTREACH (OUTPATIENT)
Dept: CASE MANAGEMENT | Facility: OTHER | Age: 63
End: 2023-04-18
Payer: MEDICARE

## 2023-04-18 NOTE — OUTREACH NOTE
AMBULATORY CASE MANAGEMENT NOTE    Name and Relationship of Patient/Support Person: Soledad Ram R - Self    Patient Outreach    HRCM follow up. Patient answered the phone and was SOB. ACM inquired if she was OK and she stated she is getting things ready for a yard sale at [employer]. She requested a return call next week.         Alexandrea MOCTEZUMA  Ambulatory Case Management    4/18/2023, 14:18 CDT

## 2023-04-27 ENCOUNTER — PATIENT OUTREACH (OUTPATIENT)
Dept: CASE MANAGEMENT | Facility: OTHER | Age: 63
End: 2023-04-27
Payer: MEDICARE

## 2023-04-27 NOTE — OUTREACH NOTE
AMBULATORY CASE MANAGEMENT NOTE    Name and Relationship of Patient/Support Person: Soledad Ram R - Self    HRCM follow up and care plan updated.         Alexandrea MOCTEZUMA  Ambulatory Case Management    4/27/2023, 13:56 CDT

## 2023-05-02 ENCOUNTER — OFFICE VISIT (OUTPATIENT)
Dept: INTERNAL MEDICINE | Facility: CLINIC | Age: 63
End: 2023-05-02
Payer: MEDICARE

## 2023-05-02 VITALS
HEIGHT: 66 IN | DIASTOLIC BLOOD PRESSURE: 70 MMHG | TEMPERATURE: 97.6 F | BODY MASS INDEX: 28.82 KG/M2 | HEART RATE: 97 BPM | OXYGEN SATURATION: 96 % | WEIGHT: 179.3 LBS | SYSTOLIC BLOOD PRESSURE: 110 MMHG

## 2023-05-02 DIAGNOSIS — Z79.4 TYPE 2 DIABETES MELLITUS WITH HYPERGLYCEMIA, WITH LONG-TERM CURRENT USE OF INSULIN: Primary | ICD-10-CM

## 2023-05-02 DIAGNOSIS — F41.9 ANXIETY: ICD-10-CM

## 2023-05-02 DIAGNOSIS — H92.01 ACUTE OTALGIA, RIGHT: ICD-10-CM

## 2023-05-02 DIAGNOSIS — E11.65 TYPE 2 DIABETES MELLITUS WITH HYPERGLYCEMIA, WITH LONG-TERM CURRENT USE OF INSULIN: Primary | ICD-10-CM

## 2023-05-02 DIAGNOSIS — F32.A DEPRESSION, UNSPECIFIED DEPRESSION TYPE: ICD-10-CM

## 2023-05-02 LAB
EXPIRATION DATE: ABNORMAL
HBA1C MFR BLD: 11.1 %
Lab: ABNORMAL

## 2023-05-02 RX ORDER — BUPROPION HYDROCHLORIDE 150 MG/1
150 TABLET ORAL DAILY
Qty: 30 TABLET | Refills: 5 | Status: SHIPPED | OUTPATIENT
Start: 2023-05-02

## 2023-05-02 RX ORDER — ORAL SEMAGLUTIDE 14 MG/1
14 TABLET ORAL DAILY
Qty: 30 TABLET | Refills: 2 | Status: SHIPPED | OUTPATIENT
Start: 2023-05-02

## 2023-05-02 NOTE — PROGRESS NOTES
Chief Complaint   Patient presents with   • Diabetes Mellitus     A1c=11.1   • Earache     Complains of right ear pain-onset last night         History:  Soledad Ram is a 63 y.o. female who presents today for evaluation of the above problems.      3 month follow-up on diabetes.  Her A1c today was 11.1 and on January 31 it was 10.6.  She remains on Rybelsus 7 mg daily, Jardiance 25 mg daily, and Amaryl 4 mg daily, and Lantus 40 units twice a day.    She has been upset, stressed and depressed related to her brother who is giving her trouble while he is in half-way.  She is upset regarding her  and her his health.  He does not want to do anything.  She has had a lot on her mind lately.  She feels like she would be much better if she did not have to deal with her brother.    She woke up this morning with right ear pain.  No other symptoms.    HPI      ROS:  Review of Systems     As above      Current Outpatient Medications:   •  Continuous Blood Gluc  (Dexcom G6 ) device, 1 each Daily., Disp: 1 each, Rfl: 5  •  Continuous Blood Gluc Sensor (Dexcom G6 Sensor), Every 10 (Ten) Days., Disp: 4 each, Rfl: 11  •  Continuous Blood Gluc Transmit (Dexcom G6 Transmitter) misc, 1 application Every 10 (Ten) Days., Disp: 1 each, Rfl: 5  •  cyclobenzaprine (FLEXERIL) 10 MG tablet, Take 1 tablet by mouth 2 (Two) Times a Day As Needed for Muscle Spasms., Disp: , Rfl:   •  DULoxetine (CYMBALTA) 60 MG capsule, Take 2 capsules by mouth. Takes 2 capsules daily, Disp: , Rfl:   •  empagliflozin (Jardiance) 25 MG tablet tablet, Take 1 tablet by mouth Daily., Disp: 30 tablet, Rfl: 2  •  fluticasone (Flonase) 50 MCG/ACT nasal spray, 2 sprays into the nostril(s) as directed by provider Daily., Disp: 16 g, Rfl: 0  •  gabapentin (NEURONTIN) 600 MG tablet, Take 1 tablet by mouth Every 8 (Eight) Hours As Needed., Disp: , Rfl:   •  glimepiride (Amaryl) 4 MG tablet, Take 1 tablet by mouth Every Morning Before Breakfast., Disp:  90 tablet, Rfl: 3  •  Insulin Glargine (LANTUS SOLOSTAR) 100 UNIT/ML injection pen, Inject 50 Units under the skin into the appropriate area as directed 2 (Two) Times a Day., Disp: 18 mL, Rfl: 5  •  losartan (COZAAR) 50 MG tablet, Take 1 tablet by mouth Daily., Disp: 90 tablet, Rfl: 3  •  oxyCODONE-acetaminophen (PERCOCET)  MG per tablet, Take 1 tablet by mouth Every 6 (Six) Hours As Needed for Moderate Pain., Disp: , Rfl:   •  polyethylene glycol (MIRALAX) 17 g packet, Take 17 g by mouth Daily., Disp: 30 packet, Rfl: 5  •  rosuvastatin (Crestor) 20 MG tablet, Take 1 tablet by mouth Daily., Disp: 90 tablet, Rfl: 3  •  traZODone (DESYREL) 50 MG tablet, Take 3 tablets by mouth Every Night., Disp: 90 tablet, Rfl: 5  •  albuterol sulfate  (90 Base) MCG/ACT inhaler, Inhale 2 puffs Every 4 (Four) Hours As Needed for Wheezing or Shortness of Air (rinse mouth after use)., Disp: 6.7 g, Rfl: 0  •  buPROPion XL (Wellbutrin XL) 150 MG 24 hr tablet, Take 1 tablet by mouth Daily., Disp: 30 tablet, Rfl: 5  •  Semaglutide (Rybelsus) 14 MG tablet, Take 1 tablet by mouth Daily., Disp: 30 tablet, Rfl: 2    Lab Results   Component Value Date    GLUCOSE 211 (H) 02/23/2022    BUN 12 02/23/2022    CREATININE 0.76 02/23/2022    BCR 15.8 02/23/2022    K 4.4 02/23/2022    CO2 27.0 02/23/2022    CALCIUM 9.8 02/23/2022    ALBUMIN 4.50 02/23/2022    BILITOT 0.3 02/23/2022    AST 48 (H) 02/23/2022    ALT 30 02/23/2022       WBC   Date Value Ref Range Status   02/23/2022 10.10 3.40 - 10.80 10*3/mm3 Final     RBC   Date Value Ref Range Status   02/23/2022 5.08 3.77 - 5.28 10*6/mm3 Final     Hemoglobin   Date Value Ref Range Status   02/23/2022 15.3 12.0 - 15.9 g/dL Final     Hematocrit   Date Value Ref Range Status   02/23/2022 47.1 (H) 34.0 - 46.6 % Final     MCV   Date Value Ref Range Status   02/23/2022 92.7 79.0 - 97.0 fL Final     MCH   Date Value Ref Range Status   02/23/2022 30.1 26.6 - 33.0 pg Final     MCHC   Date Value Ref  "Range Status   02/23/2022 32.5 31.5 - 35.7 g/dL Final     RDW   Date Value Ref Range Status   02/23/2022 13.2 12.3 - 15.4 % Final     RDW-SD   Date Value Ref Range Status   02/23/2022 45.3 37.0 - 54.0 fl Final     MPV   Date Value Ref Range Status   02/23/2022 11.6 6.0 - 12.0 fL Final     Platelets   Date Value Ref Range Status   02/23/2022 249 140 - 450 10*3/mm3 Final     Neutrophil %   Date Value Ref Range Status   02/23/2022 52.6 42.7 - 76.0 % Final     Lymphocyte %   Date Value Ref Range Status   02/23/2022 27.5 19.6 - 45.3 % Final     Monocyte %   Date Value Ref Range Status   02/23/2022 8.0 5.0 - 12.0 % Final     Eosinophil %   Date Value Ref Range Status   02/23/2022 10.4 (H) 0.3 - 6.2 % Final     Basophil %   Date Value Ref Range Status   02/23/2022 1.2 0.0 - 1.5 % Final     Immature Grans %   Date Value Ref Range Status   02/23/2022 0.3 0.0 - 0.5 % Final     Neutrophils, Absolute   Date Value Ref Range Status   02/23/2022 5.31 1.70 - 7.00 10*3/mm3 Final     Lymphocytes, Absolute   Date Value Ref Range Status   02/23/2022 2.78 0.70 - 3.10 10*3/mm3 Final     Monocytes, Absolute   Date Value Ref Range Status   02/23/2022 0.81 0.10 - 0.90 10*3/mm3 Final     Eosinophils, Absolute   Date Value Ref Range Status   02/23/2022 1.05 (H) 0.00 - 0.40 10*3/mm3 Final     Basophils, Absolute   Date Value Ref Range Status   02/23/2022 0.12 0.00 - 0.20 10*3/mm3 Final     Immature Grans, Absolute   Date Value Ref Range Status   02/23/2022 0.03 0.00 - 0.05 10*3/mm3 Final     nRBC   Date Value Ref Range Status   02/23/2022 0.0 0.0 - 0.2 /100 WBC Final         OBJECTIVE:  Visit Vitals  /70 (BP Location: Left arm, Patient Position: Sitting, Cuff Size: Adult)   Pulse 97   Temp 97.6 °F (36.4 °C) (Temporal)   Ht 167.6 cm (66\")   Wt 81.3 kg (179 lb 4.8 oz)   SpO2 96%   BMI 28.94 kg/m²      Physical Exam  Vitals and nursing note reviewed.   Constitutional:       General: She is not in acute distress.     Appearance: She is " well-developed. She is not diaphoretic.   HENT:      Head: Normocephalic and atraumatic.      Right Ear: Ear canal and external ear normal. A middle ear effusion (Mild) is present. There is no impacted cerumen. Tympanic membrane is not injected or erythematous.      Left Ear: Tympanic membrane, ear canal and external ear normal. There is no impacted cerumen.   Eyes:      Pupils: Pupils are equal, round, and reactive to light.   Neck:      Thyroid: No thyromegaly.      Trachea: Phonation normal.   Pulmonary:      Effort: No respiratory distress.   Skin:     Coloration: Skin is not pale.      Findings: No erythema.   Neurological:      Mental Status: She is alert.   Psychiatric:         Behavior: Behavior normal.         Thought Content: Thought content normal.         Judgment: Judgment normal.      Comments: At times tearful         Assessment/Plan    Diagnoses and all orders for this visit:    1. Type 2 diabetes mellitus with hyperglycemia, with long-term current use of insulin (Primary)  -     Semaglutide (Rybelsus) 14 MG tablet; Take 1 tablet by mouth Daily.  Dispense: 30 tablet; Refill: 2  -     Insulin Glargine (LANTUS SOLOSTAR) 100 UNIT/ML injection pen; Inject 50 Units under the skin into the appropriate area as directed 2 (Two) Times a Day.  Dispense: 18 mL; Refill: 5  -     POC Glycosylated Hemoglobin (Hb A1C)    2. Anxiety  -     buPROPion XL (Wellbutrin XL) 150 MG 24 hr tablet; Take 1 tablet by mouth Daily.  Dispense: 30 tablet; Refill: 5    3. Depression, unspecified depression type  -     buPROPion XL (Wellbutrin XL) 150 MG 24 hr tablet; Take 1 tablet by mouth Daily.  Dispense: 30 tablet; Refill: 5      Diabetes is still uncontrolled.  Increase Rybelsus to 14 mg daily.  Also going to increase her Lantus to 50 units twice a day.  Continue Jardiance 25 mg daily and Amaryl 4 mg daily.    Continue Cymbalta at 120 mg daily.  I would like to start Wellbutrin.  Recommend getting a counselor.    Try  over-the-counter Flonase to help with her right otalgia with mild middle ear effusion.  Let me know if symptoms persist or worsen.    Follow-up in 4 to 6 weeks to recheck her anxiety/depression.  Follow-up sooner if indicated      Return in about 4 weeks (around 5/30/2023) for Recheck.      AURORA Sarabia MD  08:30 CDT  5/2/2023

## 2023-05-03 ENCOUNTER — TRANSCRIBE ORDERS (OUTPATIENT)
Dept: ADMINISTRATIVE | Facility: HOSPITAL | Age: 63
End: 2023-05-03
Payer: MEDICARE

## 2023-05-03 ENCOUNTER — HOSPITAL ENCOUNTER (OUTPATIENT)
Dept: GENERAL RADIOLOGY | Facility: HOSPITAL | Age: 63
Discharge: HOME | End: 2023-05-03
Payer: MEDICARE

## 2023-05-03 DIAGNOSIS — M25.551 BILATERAL HIP PAIN: ICD-10-CM

## 2023-05-03 DIAGNOSIS — M25.552 BILATERAL HIP PAIN: Primary | ICD-10-CM

## 2023-05-03 DIAGNOSIS — M25.552 BILATERAL HIP PAIN: ICD-10-CM

## 2023-05-03 DIAGNOSIS — M25.551 BILATERAL HIP PAIN: Primary | ICD-10-CM

## 2023-05-12 ENCOUNTER — HOSPITAL ENCOUNTER (OUTPATIENT)
Dept: GENERAL RADIOLOGY | Facility: HOSPITAL | Age: 63
Discharge: HOME | End: 2023-05-12
Payer: MEDICARE

## 2023-05-12 PROCEDURE — 73521 X-RAY EXAM HIPS BI 2 VIEWS: CPT

## 2023-05-16 ENCOUNTER — TELEPHONE (OUTPATIENT)
Dept: INTERNAL MEDICINE | Facility: CLINIC | Age: 63
End: 2023-05-16
Payer: MEDICARE

## 2023-05-16 NOTE — TELEPHONE ENCOUNTER
Woke up this morning feeling dizzy and nauseous. Denies any other symptoms at the time of call. Wondering if something can be called in to help with symptoms.

## 2023-05-17 ENCOUNTER — OFFICE VISIT (OUTPATIENT)
Dept: INTERNAL MEDICINE | Facility: CLINIC | Age: 63
End: 2023-05-17
Payer: MEDICARE

## 2023-05-17 VITALS
OXYGEN SATURATION: 96 % | SYSTOLIC BLOOD PRESSURE: 108 MMHG | BODY MASS INDEX: 28.94 KG/M2 | DIASTOLIC BLOOD PRESSURE: 58 MMHG | HEART RATE: 84 BPM | HEIGHT: 66 IN

## 2023-05-17 DIAGNOSIS — R11.0 NAUSEA: ICD-10-CM

## 2023-05-17 DIAGNOSIS — R42 VERTIGO: Primary | ICD-10-CM

## 2023-05-17 PROCEDURE — 99213 OFFICE O/P EST LOW 20 MIN: CPT | Performed by: NURSE PRACTITIONER

## 2023-05-17 PROCEDURE — 3078F DIAST BP <80 MM HG: CPT | Performed by: NURSE PRACTITIONER

## 2023-05-17 PROCEDURE — 3074F SYST BP LT 130 MM HG: CPT | Performed by: NURSE PRACTITIONER

## 2023-05-17 PROCEDURE — 3046F HEMOGLOBIN A1C LEVEL >9.0%: CPT | Performed by: NURSE PRACTITIONER

## 2023-05-17 PROCEDURE — 1159F MED LIST DOCD IN RCRD: CPT | Performed by: NURSE PRACTITIONER

## 2023-05-17 PROCEDURE — 1160F RVW MEDS BY RX/DR IN RCRD: CPT | Performed by: NURSE PRACTITIONER

## 2023-05-17 RX ORDER — MECLIZINE HYDROCHLORIDE 25 MG/1
25 TABLET ORAL 3 TIMES DAILY PRN
Qty: 30 TABLET | Refills: 0 | Status: SHIPPED | OUTPATIENT
Start: 2023-05-17

## 2023-05-17 RX ORDER — ONDANSETRON 4 MG/1
4 TABLET, FILM COATED ORAL EVERY 8 HOURS PRN
Qty: 30 TABLET | Refills: 0 | Status: SHIPPED | OUTPATIENT
Start: 2023-05-17

## 2023-05-17 NOTE — PROGRESS NOTES
Chief Complaint   Patient presents with    Dizziness     Started yesterday         History:  Soledad Ram is a 63 y.o. female who presents today for evaluation of the above problems.          Started yesterday, room moving when she moves to quickly.  Feels nauseated, like she is seasick or drunk. No other neuro symptoms. Had vertigo in the past, feels like the same thing. Ear was bothering her a few weeks ago but better now. Using Flonase    Dizziness      ROS:  Review of Systems   Neurological:  Positive for dizziness.   As above    Allergies   Allergen Reactions    Lisinopril Swelling     FACE SWELLS    Erythromycin Rash     Past Medical History:   Diagnosis Date    Diabetes mellitus     Elevated cholesterol     Hypertension     Infection      Past Surgical History:   Procedure Laterality Date    BACK SURGERY      CARPAL TUNNEL RELEASE      COLONOSCOPY N/A 4/18/2022    Procedure: COLONOSCOPY WITH ANESTHESIA;  Surgeon: Simba Rai DO;  Location: [facility] ENDOSCOPY;  Service: Gastroenterology;  Laterality: N/A;  pre altered bowel habits   post normal  KIRTI Sarabia MD        TONSILLECTOMY      TUBAL ABDOMINAL LIGATION       Family History   Problem Relation Age of Onset    Lung cancer Mother     Lung cancer Father     Lung cancer Sister     Heart disease Paternal Grandfather     Breast cancer Neg Hx     Colon cancer Neg Hx     Colon polyps Neg Hx     Esophageal cancer Neg Hx      Soledad  reports that she has never smoked. She has never used smokeless tobacco. She reports that she does not drink alcohol and does not use drugs.    I have reviewed and updated the above documentation (if necessary) including but not limited to chief complaint, ROS, PFSH, allergies and medications        Current Outpatient Medications:     albuterol sulfate  (90 Base) MCG/ACT inhaler, Inhale 2 puffs Every 4 (Four) Hours As Needed for Wheezing or Shortness of Air (rinse mouth after use)., Disp: 6.7 g, Rfl: 0     buPROPion XL (Wellbutrin XL) 150 MG 24 hr tablet, Take 1 tablet by mouth Daily., Disp: 30 tablet, Rfl: 5    Continuous Blood Gluc  (Dexcom G6 ) device, 1 each Daily., Disp: 1 each, Rfl: 5    Continuous Blood Gluc Sensor (Dexcom G6 Sensor), Every 10 (Ten) Days., Disp: 4 each, Rfl: 11    Continuous Blood Gluc Transmit (Dexcom G6 Transmitter) misc, 1 application Every 10 (Ten) Days., Disp: 1 each, Rfl: 5    cyclobenzaprine (FLEXERIL) 10 MG tablet, Take 1 tablet by mouth 2 (Two) Times a Day As Needed for Muscle Spasms., Disp: , Rfl:     DULoxetine (CYMBALTA) 60 MG capsule, Take 2 capsules by mouth. Takes 2 capsules daily, Disp: , Rfl:     empagliflozin (Jardiance) 25 MG tablet tablet, Take 1 tablet by mouth Daily., Disp: 30 tablet, Rfl: 2    fluticasone (Flonase) 50 MCG/ACT nasal spray, 2 sprays into the nostril(s) as directed by provider Daily., Disp: 16 g, Rfl: 0    gabapentin (NEURONTIN) 600 MG tablet, Take 1 tablet by mouth Every 8 (Eight) Hours As Needed., Disp: , Rfl:     glimepiride (Amaryl) 4 MG tablet, Take 1 tablet by mouth Every Morning Before Breakfast., Disp: 90 tablet, Rfl: 3    Insulin Glargine (LANTUS SOLOSTAR) 100 UNIT/ML injection pen, Inject 50 Units under the skin into the appropriate area as directed 2 (Two) Times a Day., Disp: 18 mL, Rfl: 5    losartan (COZAAR) 50 MG tablet, Take 1 tablet by mouth Daily., Disp: 90 tablet, Rfl: 3    oxyCODONE-acetaminophen (PERCOCET)  MG per tablet, Take 1 tablet by mouth Every 6 (Six) Hours As Needed for Moderate Pain., Disp: , Rfl:     polyethylene glycol (MIRALAX) 17 g packet, Take 17 g by mouth Daily., Disp: 30 packet, Rfl: 5    rosuvastatin (Crestor) 20 MG tablet, Take 1 tablet by mouth Daily., Disp: 90 tablet, Rfl: 3    Semaglutide (Rybelsus) 14 MG tablet, Take 1 tablet by mouth Daily., Disp: 30 tablet, Rfl: 2    traZODone (DESYREL) 50 MG tablet, Take 3 tablets by mouth Every Night., Disp: 90 tablet, Rfl: 5    meclizine (ANTIVERT) 25 MG  "tablet, Take 1 tablet by mouth 3 (Three) Times a Day As Needed for Dizziness., Disp: 30 tablet, Rfl: 0    ondansetron (Zofran) 4 MG tablet, Take 1 tablet by mouth Every 8 (Eight) Hours As Needed for Nausea or Vomiting., Disp: 30 tablet, Rfl: 0    OBJECTIVE:  Visit Vitals  /58 (BP Location: Right arm, Patient Position: Sitting, Cuff Size: Adult)   Pulse 84   Ht 167.6 cm (66\")   SpO2 96%   BMI 28.94 kg/m²      Physical Exam  Vitals and nursing note reviewed.   Constitutional:       General: She is not in acute distress.     Appearance: Normal appearance. She is not ill-appearing, toxic-appearing or diaphoretic.   HENT:      Head: Normocephalic and atraumatic.      Right Ear: Ear canal and external ear normal. There is no impacted cerumen.      Left Ear: Ear canal and external ear normal. There is no impacted cerumen.      Ears:      Comments: Both TM's are effused, no erythema.     Nose: Nose normal. No congestion or rhinorrhea.   Eyes:      Extraocular Movements: Extraocular movements intact.      Conjunctiva/sclera: Conjunctivae normal.      Pupils: Pupils are equal, round, and reactive to light.      Comments: Bilateral lateral nystagmus with EOM's   Neck:      Vascular: No carotid bruit.      Comments: No thyromegaly or mass appreciated.    Cardiovascular:      Rate and Rhythm: Normal rate and regular rhythm.   Pulmonary:      Effort: Pulmonary effort is normal. No respiratory distress.      Breath sounds: Normal breath sounds. No wheezing.   Abdominal:      General: There is no distension.      Palpations: Abdomen is soft.      Tenderness: There is no abdominal tenderness.   Musculoskeletal:      Cervical back: No rigidity or tenderness.      Right lower leg: No edema.      Left lower leg: No edema.   Lymphadenopathy:      Cervical: No cervical adenopathy.   Skin:     General: Skin is warm and dry.   Neurological:      General: No focal deficit present.      Mental Status: She is alert and oriented to " person, place, and time.      Gait: Gait normal.      Comments: Holding head still, reports dizzy feeling when she turns quickly.   Psychiatric:         Mood and Affect: Mood normal.         Behavior: Behavior normal.         Thought Content: Thought content normal.         Judgment: Judgment normal.       [facility]      Assessment/Plan    Diagnoses and all orders for this visit:    1. Vertigo (Primary)  -     meclizine (ANTIVERT) 25 MG tablet; Take 1 tablet by mouth 3 (Three) Times a Day As Needed for Dizziness.  Dispense: 30 tablet; Refill: 0    2. Nausea  -     ondansetron (Zofran) 4 MG tablet; Take 1 tablet by mouth Every 8 (Eight) Hours As Needed for Nausea or Vomiting.  Dispense: 30 tablet; Refill: 0    Information on Epley maneuver given. Please let us know if symptoms are worsening or do not improve over the next week or so.          Education materials and an After Visit Summary were printed and given to the patient at discharge.  Return if symptoms worsen or fail to improve, for Next scheduled follow up.         MARTY Parsons   10:24 CDT  5/17/2023

## 2023-05-19 ENCOUNTER — PATIENT OUTREACH (OUTPATIENT)
Dept: CASE MANAGEMENT | Facility: OTHER | Age: 63
End: 2023-05-19
Payer: MEDICARE

## 2023-05-19 NOTE — OUTREACH NOTE
AMBULATORY CASE MANAGEMENT NOTE    Name and Relationship of Patient/Support Person: Soledad Ram R - Self    Patient Outreach    HRCM follow up and care plan updated.         Alexandrea MOCTEZUMA  Ambulatory Case Management    5/19/2023, 16:30 CDT

## 2023-06-06 ENCOUNTER — OFFICE VISIT (OUTPATIENT)
Dept: INTERNAL MEDICINE | Facility: CLINIC | Age: 63
End: 2023-06-06
Payer: MEDICARE

## 2023-06-06 VITALS
OXYGEN SATURATION: 97 % | HEART RATE: 87 BPM | TEMPERATURE: 97.7 F | HEIGHT: 66 IN | DIASTOLIC BLOOD PRESSURE: 80 MMHG | SYSTOLIC BLOOD PRESSURE: 108 MMHG | WEIGHT: 181 LBS | BODY MASS INDEX: 29.09 KG/M2

## 2023-06-06 DIAGNOSIS — E11.65 TYPE 2 DIABETES MELLITUS WITH HYPERGLYCEMIA, WITH LONG-TERM CURRENT USE OF INSULIN: Primary | ICD-10-CM

## 2023-06-06 DIAGNOSIS — F41.9 ANXIETY: ICD-10-CM

## 2023-06-06 DIAGNOSIS — F32.A DEPRESSION, UNSPECIFIED DEPRESSION TYPE: ICD-10-CM

## 2023-06-06 DIAGNOSIS — Z79.4 TYPE 2 DIABETES MELLITUS WITH HYPERGLYCEMIA, WITH LONG-TERM CURRENT USE OF INSULIN: Primary | ICD-10-CM

## 2023-06-06 PROCEDURE — 3074F SYST BP LT 130 MM HG: CPT | Performed by: INTERNAL MEDICINE

## 2023-06-06 PROCEDURE — 3079F DIAST BP 80-89 MM HG: CPT | Performed by: INTERNAL MEDICINE

## 2023-06-06 PROCEDURE — 3046F HEMOGLOBIN A1C LEVEL >9.0%: CPT | Performed by: INTERNAL MEDICINE

## 2023-06-06 PROCEDURE — 99213 OFFICE O/P EST LOW 20 MIN: CPT | Performed by: INTERNAL MEDICINE

## 2023-06-06 NOTE — PATIENT INSTRUCTIONS
[facility], [facility]  [facility]  [address], [place] [address] ·   [phone]

## 2023-06-06 NOTE — PROGRESS NOTES
Chief Complaint   Patient presents with    Diabetes      this morning         History:  Soledad Ram is a 63 y.o. female who presents today for evaluation of the above problems.      4-week follow-up on anxiety and depression.  At last visit I started Wellbutrin to add to her Cymbalta.  She is got some improvement in her symptoms.  Her brother will be getting help soon which is a big source of her symptoms.  Additionally, she is worried about her 's health and this is contributing to her symptoms.  She has not established with a counselor yet    Blood sugar is better.  She has a Dexcom and her blood sugar currently is 114.  She has been taking Rybelsus 14 mg daily, Jardiance 25 mg daily, Amaryl 4 mg daily, and is taking Lantus 40 units twice daily    HPI      ROS:  Review of Systems    As above      Current Outpatient Medications:     albuterol sulfate  (90 Base) MCG/ACT inhaler, Inhale 2 puffs Every 4 (Four) Hours As Needed for Wheezing or Shortness of Air (rinse mouth after use)., Disp: 6.7 g, Rfl: 0    Continuous Blood Gluc  (Dexcom G6 ) device, 1 each Daily., Disp: 1 each, Rfl: 5    Continuous Blood Gluc Sensor (Dexcom G6 Sensor), Every 10 (Ten) Days., Disp: 4 each, Rfl: 11    Continuous Blood Gluc Transmit (Dexcom G6 Transmitter) misc, 1 application Every 10 (Ten) Days., Disp: 1 each, Rfl: 5    cyclobenzaprine (FLEXERIL) 10 MG tablet, Take 1 tablet by mouth 2 (Two) Times a Day As Needed for Muscle Spasms., Disp: , Rfl:     DULoxetine (CYMBALTA) 60 MG capsule, Take 2 capsules by mouth. Takes 2 capsules daily, Disp: , Rfl:     empagliflozin (Jardiance) 25 MG tablet tablet, Take 1 tablet by mouth Daily., Disp: 30 tablet, Rfl: 2    fluticasone (Flonase) 50 MCG/ACT nasal spray, 2 sprays into the nostril(s) as directed by provider Daily., Disp: 16 g, Rfl: 0    gabapentin (NEURONTIN) 600 MG tablet, Take 1 tablet by mouth Every 8 (Eight) Hours As Needed., Disp: , Rfl:      glimepiride (Amaryl) 4 MG tablet, Take 1 tablet by mouth Every Morning Before Breakfast., Disp: 90 tablet, Rfl: 3    Insulin Glargine (LANTUS SOLOSTAR) 100 UNIT/ML injection pen, Inject 50 Units under the skin into the appropriate area as directed 2 (Two) Times a Day. (Patient taking differently: Inject 50 Units under the skin into the appropriate area as directed 2 (Two) Times a Day. Patient reports 40 units), Disp: 18 mL, Rfl: 5    losartan (COZAAR) 50 MG tablet, Take 1 tablet by mouth Daily., Disp: 90 tablet, Rfl: 3    meclizine (ANTIVERT) 25 MG tablet, Take 1 tablet by mouth 3 (Three) Times a Day As Needed for Dizziness., Disp: 30 tablet, Rfl: 0    ondansetron (Zofran) 4 MG tablet, Take 1 tablet by mouth Every 8 (Eight) Hours As Needed for Nausea or Vomiting., Disp: 30 tablet, Rfl: 0    oxyCODONE-acetaminophen (PERCOCET)  MG per tablet, Take 1 tablet by mouth Every 6 (Six) Hours As Needed for Moderate Pain., Disp: , Rfl:     polyethylene glycol (MIRALAX) 17 g packet, Take 17 g by mouth Daily., Disp: 30 packet, Rfl: 5    rosuvastatin (Crestor) 20 MG tablet, Take 1 tablet by mouth Daily., Disp: 90 tablet, Rfl: 3    Semaglutide (Rybelsus) 14 MG tablet, Take 1 tablet by mouth Daily., Disp: 30 tablet, Rfl: 2    traZODone (DESYREL) 50 MG tablet, Take 3 tablets by mouth Every Night., Disp: 90 tablet, Rfl: 5    Lab Results   Component Value Date    GLUCOSE 211 (H) 02/23/2022    BUN 12 02/23/2022    CREATININE 0.76 02/23/2022    BCR 15.8 02/23/2022    K 4.4 02/23/2022    CO2 27.0 02/23/2022    CALCIUM 9.8 02/23/2022    ALBUMIN 4.50 02/23/2022    BILITOT 0.3 02/23/2022    AST 48 (H) 02/23/2022    ALT 30 02/23/2022       WBC   Date Value Ref Range Status   02/23/2022 10.10 3.40 - 10.80 10*3/mm3 Final     RBC   Date Value Ref Range Status   02/23/2022 5.08 3.77 - 5.28 10*6/mm3 Final     Hemoglobin   Date Value Ref Range Status   02/23/2022 15.3 12.0 - 15.9 g/dL Final     Hematocrit   Date Value Ref Range Status    02/23/2022 47.1 (H) 34.0 - 46.6 % Final     MCV   Date Value Ref Range Status   02/23/2022 92.7 79.0 - 97.0 fL Final     MCH   Date Value Ref Range Status   02/23/2022 30.1 26.6 - 33.0 pg Final     MCHC   Date Value Ref Range Status   02/23/2022 32.5 31.5 - 35.7 g/dL Final     RDW   Date Value Ref Range Status   02/23/2022 13.2 12.3 - 15.4 % Final     RDW-SD   Date Value Ref Range Status   02/23/2022 45.3 37.0 - 54.0 fl Final     MPV   Date Value Ref Range Status   02/23/2022 11.6 6.0 - 12.0 fL Final     Platelets   Date Value Ref Range Status   02/23/2022 249 140 - 450 10*3/mm3 Final     Neutrophil %   Date Value Ref Range Status   02/23/2022 52.6 42.7 - 76.0 % Final     Lymphocyte %   Date Value Ref Range Status   02/23/2022 27.5 19.6 - 45.3 % Final     Monocyte %   Date Value Ref Range Status   02/23/2022 8.0 5.0 - 12.0 % Final     Eosinophil %   Date Value Ref Range Status   02/23/2022 10.4 (H) 0.3 - 6.2 % Final     Basophil %   Date Value Ref Range Status   02/23/2022 1.2 0.0 - 1.5 % Final     Immature Grans %   Date Value Ref Range Status   02/23/2022 0.3 0.0 - 0.5 % Final     Neutrophils, Absolute   Date Value Ref Range Status   02/23/2022 5.31 1.70 - 7.00 10*3/mm3 Final     Lymphocytes, Absolute   Date Value Ref Range Status   02/23/2022 2.78 0.70 - 3.10 10*3/mm3 Final     Monocytes, Absolute   Date Value Ref Range Status   02/23/2022 0.81 0.10 - 0.90 10*3/mm3 Final     Eosinophils, Absolute   Date Value Ref Range Status   02/23/2022 1.05 (H) 0.00 - 0.40 10*3/mm3 Final     Basophils, Absolute   Date Value Ref Range Status   02/23/2022 0.12 0.00 - 0.20 10*3/mm3 Final     Immature Grans, Absolute   Date Value Ref Range Status   02/23/2022 0.03 0.00 - 0.05 10*3/mm3 Final     nRBC   Date Value Ref Range Status   02/23/2022 0.0 0.0 - 0.2 /100 WBC Final         OBJECTIVE:  Visit Vitals  /80 (BP Location: Left arm, Patient Position: Sitting, Cuff Size: Adult)   Pulse 87   Temp 97.7 °F (36.5 °C) (Temporal)  "  Ht 167.6 cm (66\")   Wt 82.1 kg (181 lb)   SpO2 97%   BMI 29.21 kg/m²      Physical Exam  Vitals and nursing note reviewed.   Constitutional:       General: She is not in acute distress.     Appearance: Normal appearance. She is well-developed. She is not diaphoretic.      Comments: Pleasant   HENT:      Head: Normocephalic and atraumatic.   Eyes:      Pupils: Pupils are equal, round, and reactive to light.   Neck:      Thyroid: No thyromegaly.      Trachea: Phonation normal.   Pulmonary:      Effort: No respiratory distress.   Skin:     Coloration: Skin is not pale.      Findings: No erythema.   Neurological:      Mental Status: She is alert.   Psychiatric:         Behavior: Behavior normal.         Thought Content: Thought content normal.         Judgment: Judgment normal.       Assessment/Plan    Diagnoses and all orders for this visit:    1. Type 2 diabetes mellitus with hyperglycemia, with long-term current use of insulin (Primary)  -     Ambulatory Referral to Diabetic Education    2. Anxiety    3. Depression, unspecified depression type    Discontinue Wellbutrin as it was not effective in treating her symptoms.  Recommend she establish with a counselor.  I expect her symptoms to improve as the cause of situations also improved.  Her brother is in the process of getting help, and hopefully her  will start to feel better shortly.    Follow-up in 3 months for Medicare wellness, or sooner if indicated.  We will get blood work and microalbumin at that visit.    22 minutes spent total on the day of the visit.    Return in about 3 months (around 9/6/2023) for Medicare Wellness.      AURORA Sarabia MD  15:57 CDT  6/6/2023   "

## 2023-08-25 ENCOUNTER — TELEPHONE (OUTPATIENT)
Dept: INTERNAL MEDICINE | Facility: CLINIC | Age: 63
End: 2023-08-25
Payer: MEDICARE

## 2023-08-25 ENCOUNTER — OFFICE VISIT (OUTPATIENT)
Dept: INTERNAL MEDICINE | Facility: CLINIC | Age: 63
End: 2023-08-25
Payer: MEDICARE

## 2023-08-25 VITALS
DIASTOLIC BLOOD PRESSURE: 72 MMHG | OXYGEN SATURATION: 97 % | HEART RATE: 85 BPM | WEIGHT: 184 LBS | SYSTOLIC BLOOD PRESSURE: 108 MMHG | HEIGHT: 66 IN | BODY MASS INDEX: 29.57 KG/M2 | TEMPERATURE: 96.6 F | RESPIRATION RATE: 16 BRPM

## 2023-08-25 DIAGNOSIS — N63.24 MASS OF LOWER INNER QUADRANT OF LEFT BREAST: ICD-10-CM

## 2023-08-25 DIAGNOSIS — Z12.31 ENCOUNTER FOR SCREENING MAMMOGRAM FOR MALIGNANT NEOPLASM OF BREAST: Primary | ICD-10-CM

## 2023-08-25 NOTE — PROGRESS NOTES
Chief Complaint   Patient presents with    Breast Mass     Noticed approximately 3 weeks ago, feels larger in size since first noticed        HPI     Soledad Ram is a 63 y.o. female presents for left breast lump which she noticed a few weeks ago. It is mildly tender and has increased in size. She denies family history of breast cancer but does have family history of lung cancer and is nervous because of this. Last mammogram was in March of 2022 and was normal.            ROS:  Review of Systems   Constitutional:  Negative for fatigue and fever.   Respiratory: Negative.     Cardiovascular: Negative.         reports that she has never smoked. She has never used smokeless tobacco. She reports that she does not drink alcohol and does not use drugs.    Current Outpatient Medications:     albuterol sulfate  (90 Base) MCG/ACT inhaler, Inhale 2 puffs Every 4 (Four) Hours As Needed for Wheezing or Shortness of Air (rinse mouth after use)., Disp: 6.7 g, Rfl: 0    Continuous Blood Gluc  (Dexcom G6 ) device, 1 each Daily., Disp: 1 each, Rfl: 5    Continuous Blood Gluc Sensor (Dexcom G6 Sensor), Every 10 (Ten) Days., Disp: 4 each, Rfl: 11    Continuous Blood Gluc Transmit (Dexcom G6 Transmitter) misc, 1 application Every 10 (Ten) Days., Disp: 1 each, Rfl: 5    cyclobenzaprine (FLEXERIL) 10 MG tablet, Take 1 tablet by mouth 2 (Two) Times a Day As Needed for Muscle Spasms., Disp: , Rfl:     empagliflozin (Jardiance) 25 MG tablet tablet, Take 1 tablet by mouth Daily., Disp: 30 tablet, Rfl: 2    fluticasone (Flonase) 50 MCG/ACT nasal spray, 2 sprays into the nostril(s) as directed by provider Daily., Disp: 16 g, Rfl: 0    gabapentin (NEURONTIN) 600 MG tablet, Take 1 tablet by mouth Every 8 (Eight) Hours As Needed., Disp: , Rfl:     glimepiride (Amaryl) 4 MG tablet, Take 1 tablet by mouth Every Morning Before Breakfast., Disp: 90 tablet, Rfl: 3    Insulin Glargine (LANTUS SOLOSTAR) 100 UNIT/ML injection  "pen, Inject 50 Units under the skin into the appropriate area as directed 2 (Two) Times a Day., Disp: 18 mL, Rfl: 5    Insulin Lispro, 1 Unit Dial, (HumaLOG KwikPen) 100 UNIT/ML solution pen-injector, Inject 15 Units under the skin into the appropriate area as directed 3 (Three) Times a Day Before Meals., Disp: 15 mL, Rfl: 5    losartan (COZAAR) 50 MG tablet, Take 1 tablet by mouth Daily., Disp: 90 tablet, Rfl: 3    meclizine (ANTIVERT) 25 MG tablet, Take 1 tablet by mouth 3 (Three) Times a Day As Needed for Dizziness., Disp: 30 tablet, Rfl: 0    oxyCODONE-acetaminophen (PERCOCET)  MG per tablet, Take 1 tablet by mouth Every 6 (Six) Hours As Needed for Moderate Pain., Disp: , Rfl:     polyethylene glycol (MIRALAX) 17 g packet, Take 17 g by mouth Daily., Disp: 30 packet, Rfl: 5    rosuvastatin (Crestor) 20 MG tablet, Take 1 tablet by mouth Daily., Disp: 90 tablet, Rfl: 3    traZODone (DESYREL) 50 MG tablet, Take 3 tablets by mouth Every Night., Disp: 90 tablet, Rfl: 5    DULoxetine (CYMBALTA) 60 MG capsule, Take 1 capsule by mouth 2 (Two) Times a Day. (Patient not taking: Reported on 8/25/2023), Disp: , Rfl:     OBJECTIVE:  /72 (BP Location: Left arm, Patient Position: Sitting, Cuff Size: Adult)   Pulse 85   Temp 96.6 øF (35.9 øC) (Temporal)   Resp 16   Ht 167.6 cm (66\")   Wt 83.5 kg (184 lb)   SpO2 97%   BMI 29.70 kg/mý    Physical Exam  Chest:   Breasts:     Left: Mass and tenderness present. No nipple discharge.          Comments: Pea sized lump palpated near 7 oclock to left breast      ASSESSMENT/PLAN:     Diagnoses and all orders for this visit:    1. Encounter for screening mammogram for malignant neoplasm of breast (Primary)  -     Mammo Diagnostic Bilateral With CAD; Future    2. Mass of lower inner quadrant of left breast  -     Mammo Diagnostic Bilateral With CAD; Future    Will obtain mammogram to further evaluate.       An After Visit Summary was printed and given to the patient at " discharge.  Return if symptoms worsen or fail to improve.          Brenda Johnston, APRN 8/25/2023   Electronically signed.

## 2023-08-25 NOTE — TELEPHONE ENCOUNTER
Patient called this morning voicing concerns that she felt a lump in left breast. States sore to touch and cancer runs in her family.    Last mammogram: looks like last mammogram was 3/31/22 and cofirmed by [facility] as well.     Patient scheduled to be seen in office 09/7/23. Wondering what would be the next step far as moving appt up for evaluation or can this be done the day of scheduled appointment.

## 2023-08-25 NOTE — TELEPHONE ENCOUNTER
PATIENT HAS BEEN CALLED, SHE STATED THAT THERE IS AN OPENING TODAY AT 3pm AND SHE IS GOING TO TAKE THAT.

## 2023-08-28 DIAGNOSIS — N63.24 MASS OF LOWER INNER QUADRANT OF LEFT BREAST: Primary | ICD-10-CM

## 2023-08-30 ENCOUNTER — HOSPITAL ENCOUNTER (OUTPATIENT)
Dept: MAMMOGRAPHY | Facility: HOSPITAL | Age: 63
Discharge: HOME | End: 2023-08-30
Payer: MEDICARE

## 2023-08-30 ENCOUNTER — HOSPITAL ENCOUNTER (OUTPATIENT)
Dept: ULTRASOUND IMAGING | Facility: HOSPITAL | Age: 63
Discharge: HOME | End: 2023-08-30
Payer: MEDICARE

## 2023-08-30 DIAGNOSIS — N63.24 MASS OF LOWER INNER QUADRANT OF LEFT BREAST: ICD-10-CM

## 2023-08-30 DIAGNOSIS — Z12.31 ENCOUNTER FOR SCREENING MAMMOGRAM FOR MALIGNANT NEOPLASM OF BREAST: ICD-10-CM

## 2023-08-30 PROCEDURE — G0279 TOMOSYNTHESIS, MAMMO: HCPCS

## 2023-08-30 PROCEDURE — 77066 DX MAMMO INCL CAD BI: CPT

## 2023-08-30 PROCEDURE — 76642 ULTRASOUND BREAST LIMITED: CPT

## 2023-09-12 ENCOUNTER — OFFICE VISIT (OUTPATIENT)
Dept: INTERNAL MEDICINE | Facility: CLINIC | Age: 63
End: 2023-09-12
Payer: MEDICARE

## 2023-09-12 VITALS
WEIGHT: 191.3 LBS | DIASTOLIC BLOOD PRESSURE: 80 MMHG | TEMPERATURE: 97.4 F | OXYGEN SATURATION: 98 % | HEART RATE: 81 BPM | HEIGHT: 66 IN | SYSTOLIC BLOOD PRESSURE: 130 MMHG | BODY MASS INDEX: 30.74 KG/M2

## 2023-09-12 DIAGNOSIS — Z79.4 TYPE 2 DIABETES MELLITUS WITH HYPERGLYCEMIA, WITH LONG-TERM CURRENT USE OF INSULIN: ICD-10-CM

## 2023-09-12 DIAGNOSIS — I10 ESSENTIAL HYPERTENSION: ICD-10-CM

## 2023-09-12 DIAGNOSIS — E66.9 OBESITY (BMI 30.0-34.9): ICD-10-CM

## 2023-09-12 DIAGNOSIS — Z00.00 MEDICARE ANNUAL WELLNESS VISIT, SUBSEQUENT: Primary | ICD-10-CM

## 2023-09-12 DIAGNOSIS — E11.65 TYPE 2 DIABETES MELLITUS WITH HYPERGLYCEMIA, WITH LONG-TERM CURRENT USE OF INSULIN: ICD-10-CM

## 2023-09-12 DIAGNOSIS — E78.5 HYPERLIPIDEMIA, UNSPECIFIED HYPERLIPIDEMIA TYPE: ICD-10-CM

## 2023-09-12 DIAGNOSIS — F33.1 MODERATE EPISODE OF RECURRENT MAJOR DEPRESSIVE DISORDER: ICD-10-CM

## 2023-09-12 DIAGNOSIS — Z91.81 AT LOW RISK FOR FALL: ICD-10-CM

## 2023-09-12 DIAGNOSIS — Z13.31 DEPRESSION SCREEN: ICD-10-CM

## 2023-09-12 RX ORDER — ROSUVASTATIN CALCIUM 20 MG/1
20 TABLET, COATED ORAL DAILY
Qty: 90 TABLET | Refills: 3 | Status: SHIPPED | OUTPATIENT
Start: 2023-09-12

## 2023-09-12 RX ORDER — LOSARTAN POTASSIUM 50 MG/1
50 TABLET ORAL DAILY
Qty: 90 TABLET | Refills: 3 | Status: SHIPPED | OUTPATIENT
Start: 2023-09-12

## 2023-09-12 RX ORDER — TRAZODONE HYDROCHLORIDE 50 MG/1
150 TABLET ORAL NIGHTLY
Qty: 90 TABLET | Refills: 11 | Status: SHIPPED | OUTPATIENT
Start: 2023-09-12

## 2023-09-12 NOTE — PROGRESS NOTES
The ABCs of the Annual Wellness Visit  Subsequent Medicare Wellness Visit    Subjective    Soledad Ram is a 63 y.o. female who presents for a Subsequent Medicare Wellness Visit.    The following portions of the patient's history were reviewed and   updated as appropriate: allergies, current medications, past family history, past medical history, past social history, past surgical history, and problem list.    Compared to one year ago, the patient feels her physical   health is the same.    Compared to one year ago, the patient feels her mental   health is worse.    Recent Hospitalizations:  She was not admitted to the hospital during the last year.       Current Medical Providers:  Patient Care Team:  KIRTI Sarabia MD as PCP - General (Internal Medicine)  Alexandrea Farias RN as Ambulatory  ([facility])    Outpatient Medications Prior to Visit   Medication Sig Dispense Refill    albuterol sulfate  (90 Base) MCG/ACT inhaler Inhale 2 puffs Every 4 (Four) Hours As Needed for Wheezing or Shortness of Air (rinse mouth after use). 6.7 g 0    Continuous Blood Gluc  (Dexcom G6 ) device 1 each Daily. 1 each 5    Continuous Blood Gluc Sensor (Dexcom G6 Sensor) Every 10 (Ten) Days. 4 each 11    Continuous Blood Gluc Transmit (Dexcom G6 Transmitter) misc 1 application Every 10 (Ten) Days. 1 each 5    cyclobenzaprine (FLEXERIL) 10 MG tablet Take 1 tablet by mouth 2 (Two) Times a Day As Needed for Muscle Spasms.      DULoxetine (CYMBALTA) 60 MG capsule Take 1 capsule by mouth 2 (Two) Times a Day.      fluticasone (Flonase) 50 MCG/ACT nasal spray 2 sprays into the nostril(s) as directed by provider Daily. 16 g 0    gabapentin (NEURONTIN) 600 MG tablet Take 1 tablet by mouth Every 8 (Eight) Hours As Needed.      glimepiride (Amaryl) 4 MG tablet Take 1 tablet by mouth Every Morning Before Breakfast. 90 tablet 3    Insulin Glargine (LANTUS SOLOSTAR) 100 UNIT/ML injection pen Inject  50 Units under the skin into the appropriate area as directed 2 (Two) Times a Day. (Patient taking differently: Inject 50 Units under the skin into the appropriate area as directed 2 (Two) Times a Day. Patient reports taking 40 units) 18 mL 5    Insulin Lispro, 1 Unit Dial, (HumaLOG KwikPen) 100 UNIT/ML solution pen-injector Inject 15 Units under the skin into the appropriate area as directed 3 (Three) Times a Day Before Meals. 15 mL 5    meclizine (ANTIVERT) 25 MG tablet Take 1 tablet by mouth 3 (Three) Times a Day As Needed for Dizziness. 30 tablet 0    oxyCODONE-acetaminophen (PERCOCET)  MG per tablet Take 1 tablet by mouth Every 6 (Six) Hours As Needed for Moderate Pain.      polyethylene glycol (MIRALAX) 17 g packet Take 17 g by mouth Daily. 30 packet 5    empagliflozin (Jardiance) 25 MG tablet tablet Take 1 tablet by mouth Daily. 30 tablet 2    losartan (COZAAR) 50 MG tablet Take 1 tablet by mouth Daily. 90 tablet 3    rosuvastatin (Crestor) 20 MG tablet Take 1 tablet by mouth Daily. 90 tablet 3    traZODone (DESYREL) 50 MG tablet Take 3 tablets by mouth Every Night. 90 tablet 5     No facility-administered medications prior to visit.       Opioid medication/s are on active medication list.  and I have evaluated her active treatment plan and pain score trends (see table).  There were no vitals filed for this visit.  I have reviewed the chart for potential of high risk medication and harmful drug interactions in the elderly.          Aspirin is not on active medication list.  Aspirin use is not indicated based on review of current medical condition/s. Risk of harm outweighs potential benefits.  .    Patient Active Problem List   Diagnosis    Obesity (BMI 30.0-34.9)    Hyperlipidemia    Essential hypertension    Type 2 diabetes mellitus with hyperglycemia, with long-term current use of insulin    Chronic left-sided low back pain with left-sided sciatica    Chronic constipation    DDD (degenerative disc  "disease), lumbar    Depressive disorder    History of colonic polyps    Hypoalphalipoproteinemia    Hypomagnesemia    Insomnia    Leukocytosis    Lumbar radiculopathy    Microalbuminuria    Neuropathy    Other spondylosis with radiculopathy, lumbar region    Postmenopausal state    Encounter for long-term (current) use of insulin    Stricture of esophagus    Altered bowel function    Displacement of lumbar intervertebral disc without myelopathy    H/O Spinal surgery    Lumbosacral radiculitis    Postoperative pain    Radial styloid tenosynovitis of right hand    Spondylolisthesis    Thoracic radiculitis    Chronic pain disorder    Low back pain    Lumbago with sciatica    Synovial cyst of lumbar spine     Advance Care Planning   Advance Care Planning     Advance Directive is not on file.  ACP discussion was held with the patient during this visit. Patient does not have an advance directive, information provided.     Objective    Vitals:    09/12/23 0931   BP: 130/80   BP Location: Left arm   Patient Position: Sitting   Cuff Size: Adult   Pulse: 81   Temp: 97.4 °F (36.3 °C)   TempSrc: Temporal   SpO2: 98%   Weight: 86.8 kg (191 lb 4.8 oz)   Height: 167.6 cm (66\")     Estimated body mass index is 30.88 kg/m² as calculated from the following:    Height as of this encounter: 167.6 cm (66\").    Weight as of this encounter: 86.8 kg (191 lb 4.8 oz).    BMI is >= 30 and <35. (Class 1 Obesity). The following options were offered after discussion;: weight loss educational material (shared in after visit summary), exercise counseling/recommendations, and nutrition counseling/recommendations      Does the patient have evidence of cognitive impairment? No          HEALTH RISK ASSESSMENT    Smoking Status:  Social History     Tobacco Use   Smoking Status Never   Smokeless Tobacco Never     Alcohol Consumption:  Social History     Substance and Sexual Activity   Alcohol Use No     Fall Risk Screen:    STEADI Fall Risk Assessment " was completed, and patient is at LOW risk for falls.Assessment completed on:2023    Depression Screenin/12/2023     9:32 AM   PHQ-2/PHQ-9 Depression Screening   Little Interest or Pleasure in Doing Things 3-->nearly every day   Feeling Down, Depressed or Hopeless 3-->nearly every day   Trouble Falling or Staying Asleep, or Sleeping Too Much 1-->several days   Feeling Tired or Having Little Energy 3-->nearly every day   Poor Appetite or Overeating 3-->nearly every day   Feeling Bad about Yourself - or that You are a Failure or Have Let Yourself or Your Family Down 0-->not at all   Trouble Concentrating on Things, Such as Reading the Newspaper or Watching Television 1-->several days   Moving or Speaking So Slowly that Other People Could Have Noticed? Or the Opposite - Being So Fidgety 0-->not at all   Thoughts that You Would be Better Off Dead or of Hurting Yourself in Some Way 0-->not at all   PHQ-9: Brief Depression Severity Measure Score 14   If You Checked Off Any Problems, How Difficult Have These Problems Made It For You to Do Your Work, Take Care of Things at Home, or Get Along with Other People? not difficult at all       Health Habits and Functional and Cognitive Screenin/12/2023     9:00 AM   Functional & Cognitive Status   Do you have difficulty preparing food and eating? No   Do you have difficulty bathing yourself, getting dressed or grooming yourself? No   Do you have difficulty using the toilet? No   Do you have difficulty moving around from place to place? No   Do you have trouble with steps or getting out of a bed or a chair? No   Current Diet Well Balanced Diet   Dental Exam Up to date   Eye Exam Up to date   Exercise (times per week) 5 times per week   Current Exercises Include Yard Work   Do you need help using the phone?  No   Are you deaf or do you have serious difficulty hearing?  No   Do you need help to go to places out of walking distance? No   Do you need help  shopping? No   Do you need help preparing meals?  No   Do you need help with housework?  No   Do you need help with laundry? No   Do you need help taking your medications? No   Do you need help managing money? No   Do you ever drive or ride in a car without wearing a seat belt? No   Have you felt unusual stress, anger or loneliness in the last month? Yes   Who do you live with? Spouse   If you need help, do you have trouble finding someone available to you? No   Have you been bothered in the last four weeks by sexual problems? No   Do you have difficulty concentrating, remembering or making decisions? No       Age-appropriate Screening Schedule:  Refer to the list below for future screening recommendations based on patient's age, sex and/or medical conditions. Orders for these recommended tests are listed in the plan section. The patient has been provided with a written plan.    Health Maintenance   Topic Date Due    DIABETIC FOOT EXAM  Never done    DIABETIC EYE EXAM  01/31/2021    BMI FOLLOWUP  11/03/2022    ANNUAL WELLNESS VISIT  02/23/2023    LIPID PANEL  02/23/2023    URINE MICROALBUMIN  02/23/2023    INFLUENZA VACCINE  10/01/2023    HEMOGLOBIN A1C  11/02/2023    PAP SMEAR  01/04/2024    MAMMOGRAM  08/30/2024    TDAP/TD VACCINES (3 - Td or Tdap) 02/20/2030    COLORECTAL CANCER SCREENING  04/18/2032    HEPATITIS C SCREENING  Completed    COVID-19 Vaccine  Completed    Pneumococcal Vaccine 0-64  Completed    ZOSTER VACCINE  Completed              CMS Preventative Services Quick Reference  Risk Factors Identified During Encounter  Depression/Dysphoria:  Discussed counseling and ways to decrease home stressors  Immunizations Discussed/Encouraged: Influenza and COVID19  Polypharmacy: Medication List reviewed  The above risks/problems have been discussed with the patient.  Pertinent information has been shared with the patient in the After Visit Summary.  An After Visit Summary and PPPS were made available to the  "patient.    Follow Up:   Next Medicare Wellness visit to be scheduled in 1 year.       Additional E&M Note during same encounter follows:  Patient has multiple medical problems which are significant and separately identifiable that require additional work above and beyond the Medicare Wellness Visit.      Chief Complaint  Medicare Wellness-subsequent    Subjective        HPI  Soledad Ram is also being seen today for depression.    She is very worried about her , Matthew.  He does not do much around the house and is constantly eating.  After work he comes home, sits on the couch and does not do anything.  She is worried that he is going to have more health issues.    Her brother is at [employer] now and is also increasing stress and depression.    She has a good support system at her [employer] and is able to talk with her preacher who also does couples counseling.    Review of Systems   Constitutional:  Positive for activity change, appetite change and fatigue.   Psychiatric/Behavioral:  Positive for sleep disturbance.      Objective   Vital Signs:  /80 (BP Location: Left arm, Patient Position: Sitting, Cuff Size: Adult)   Pulse 81   Temp 97.4 °F (36.3 °C) (Temporal)   Ht 167.6 cm (66\")   Wt 86.8 kg (191 lb 4.8 oz)   SpO2 98%   BMI 30.88 kg/m²     Physical Exam  Vitals and nursing note reviewed.   Constitutional:       General: She is not in acute distress.     Appearance: Normal appearance. She is well-developed.      Comments: pleasant   HENT:      Head: Normocephalic and atraumatic.      Right Ear: Tympanic membrane, ear canal and external ear normal. There is no impacted cerumen.      Left Ear: Tympanic membrane, ear canal and external ear normal. There is no impacted cerumen.      Mouth/Throat:      Pharynx: No oropharyngeal exudate.   Eyes:      General: No scleral icterus.     Conjunctiva/sclera: Conjunctivae normal.      Pupils: Pupils are equal, round, and reactive to light.   Neck:      " Thyroid: No thyromegaly.      Vascular: No carotid bruit or JVD.   Cardiovascular:      Rate and Rhythm: Normal rate and regular rhythm.      Heart sounds: Normal heart sounds. No murmur heard.    No friction rub. No gallop.   Pulmonary:      Effort: Pulmonary effort is normal. No respiratory distress.      Breath sounds: Normal breath sounds. No stridor. No wheezing, rhonchi or rales.   Abdominal:      General: Bowel sounds are normal. There is no distension.      Palpations: Abdomen is soft.      Tenderness: There is no abdominal tenderness.   Musculoskeletal:      Right lower leg: No edema.      Left lower leg: No edema.   Skin:     General: Skin is warm and dry.      Coloration: Skin is not jaundiced.   Neurological:      Mental Status: She is alert.      Cranial Nerves: No cranial nerve deficit.   Psychiatric:         Mood and Affect: Mood normal.         Behavior: Behavior normal.         Thought Content: Thought content normal.         Judgment: Judgment normal.                       Assessment and Plan   Diagnoses and all orders for this visit:    1. Medicare annual wellness visit, subsequent (Primary)  -     CBC (No Diff); Future  -     Comprehensive Metabolic Panel; Future  -     Hemoglobin A1c; Future  -     Lipid Panel; Future    2. Depression screen    3. At low risk for fall    4. Hyperlipidemia, unspecified hyperlipidemia type  -     rosuvastatin (Crestor) 20 MG tablet; Take 1 tablet by mouth Daily.  Dispense: 90 tablet; Refill: 3    5. Type 2 diabetes mellitus with hyperglycemia, with long-term current use of insulin  -     empagliflozin (Jardiance) 25 MG tablet tablet; Take 1 tablet by mouth Daily.  Dispense: 30 tablet; Refill: 2  -     losartan (COZAAR) 50 MG tablet; Take 1 tablet by mouth Daily.  Dispense: 90 tablet; Refill: 3  -     Hemoglobin A1c; Future  -     Microalbumin / Creatinine Urine Ratio - Urine, Clean Catch; Future    6. Essential hypertension  -     losartan (COZAAR) 50 MG tablet;  Take 1 tablet by mouth Daily.  Dispense: 90 tablet; Refill: 3  -     Comprehensive Metabolic Panel; Future    7. Obesity (BMI 30.0-34.9)    8. Moderate episode of recurrent major depressive disorder    Other orders  -     traZODone (DESYREL) 50 MG tablet; Take 3 tablets by mouth Every Night.  Dispense: 90 tablet; Refill: 11      We discussed her depression symptoms and elevated PHQ-9.  Most of her symptoms are related to her .  She is concerned about his health in general.  Additionally, he has not been helping out much around the house.  I recommend she establish with a counselor and/for have couples counseling with her  with her preacher.  She is already on Cymbalta at 60 mg twice a day.  We will continue that medication.  She has been on Wellbutrin in the past and did not have any improvement.    I refilled the above medications.  We will follow-up on her A1c.  We will make adjustments in her regimen depending on those results.         Follow Up   Return in about 4 months (around 1/12/2024) for Recheck A1c.  Patient was given instructions and counseling regarding her condition or for health maintenance advice. Please see specific information pulled into the AVS if appropriate.

## 2023-09-27 ENCOUNTER — LAB (OUTPATIENT)
Dept: LAB | Facility: HOSPITAL | Age: 63
End: 2023-09-27
Payer: MEDICARE

## 2023-09-27 DIAGNOSIS — E11.65 TYPE 2 DIABETES MELLITUS WITH HYPERGLYCEMIA, WITH LONG-TERM CURRENT USE OF INSULIN: ICD-10-CM

## 2023-09-27 DIAGNOSIS — Z79.4 TYPE 2 DIABETES MELLITUS WITH HYPERGLYCEMIA, WITH LONG-TERM CURRENT USE OF INSULIN: ICD-10-CM

## 2023-09-27 DIAGNOSIS — I10 ESSENTIAL HYPERTENSION: ICD-10-CM

## 2023-09-27 DIAGNOSIS — Z00.00 MEDICARE ANNUAL WELLNESS VISIT, SUBSEQUENT: ICD-10-CM

## 2023-09-27 LAB
ALBUMIN SERPL-MCNC: 4.1 G/DL (ref 3.5–5.2)
ALBUMIN UR-MCNC: <1.2 MG/DL
ALBUMIN/GLOB SERPL: 1.4 G/DL
ALP SERPL-CCNC: 122 U/L (ref 39–117)
ALT SERPL W P-5'-P-CCNC: 31 U/L (ref 1–33)
ANION GAP SERPL CALCULATED.3IONS-SCNC: 13 MMOL/L (ref 5–15)
AST SERPL-CCNC: 28 U/L (ref 1–32)
BILIRUB SERPL-MCNC: 0.3 MG/DL (ref 0–1.2)
BUN SERPL-MCNC: 14 MG/DL (ref 8–23)
BUN/CREAT SERPL: 19.7 (ref 7–25)
CALCIUM SPEC-SCNC: 9.4 MG/DL (ref 8.6–10.5)
CHLORIDE SERPL-SCNC: 104 MMOL/L (ref 98–107)
CHOLEST SERPL-MCNC: 144 MG/DL (ref 0–200)
CO2 SERPL-SCNC: 24 MMOL/L (ref 22–29)
CREAT SERPL-MCNC: 0.71 MG/DL (ref 0.57–1)
CREAT UR-MCNC: 27.5 MG/DL
DEPRECATED RDW RBC AUTO: 44.1 FL (ref 37–54)
EGFRCR SERPLBLD CKD-EPI 2021: 96.9 ML/MIN/1.73
ERYTHROCYTE [DISTWIDTH] IN BLOOD BY AUTOMATED COUNT: 12.8 % (ref 12.3–15.4)
GLOBULIN UR ELPH-MCNC: 3 GM/DL
GLUCOSE SERPL-MCNC: 268 MG/DL (ref 65–99)
HBA1C MFR BLD: 8.6 % (ref 4.8–5.6)
HCT VFR BLD AUTO: 45.8 % (ref 34–46.6)
HDLC SERPL-MCNC: 45 MG/DL (ref 40–60)
HGB BLD-MCNC: 14.6 G/DL (ref 12–15.9)
LDLC SERPL CALC-MCNC: 61 MG/DL (ref 0–100)
LDLC/HDLC SERPL: 1.15 {RATIO}
MCH RBC QN AUTO: 29.7 PG (ref 26.6–33)
MCHC RBC AUTO-ENTMCNC: 31.9 G/DL (ref 31.5–35.7)
MCV RBC AUTO: 93.1 FL (ref 79–97)
MICROALBUMIN/CREAT UR: NORMAL MG/G{CREAT}
PLATELET # BLD AUTO: 280 10*3/MM3 (ref 140–450)
PMV BLD AUTO: 9.8 FL (ref 6–12)
POTASSIUM SERPL-SCNC: 4.3 MMOL/L (ref 3.5–5.2)
PROT SERPL-MCNC: 7.1 G/DL (ref 6–8.5)
RBC # BLD AUTO: 4.92 10*6/MM3 (ref 3.77–5.28)
SODIUM SERPL-SCNC: 141 MMOL/L (ref 136–145)
TRIGL SERPL-MCNC: 237 MG/DL (ref 0–150)
VLDLC SERPL-MCNC: 38 MG/DL (ref 5–40)
WBC NRBC COR # BLD: 9.43 10*3/MM3 (ref 3.4–10.8)

## 2023-09-27 PROCEDURE — 83036 HEMOGLOBIN GLYCOSYLATED A1C: CPT

## 2023-09-27 PROCEDURE — 82043 UR ALBUMIN QUANTITATIVE: CPT

## 2023-09-27 PROCEDURE — 80053 COMPREHEN METABOLIC PANEL: CPT

## 2023-09-27 PROCEDURE — 85027 COMPLETE CBC AUTOMATED: CPT

## 2023-09-27 PROCEDURE — 80061 LIPID PANEL: CPT

## 2023-09-27 PROCEDURE — 36415 COLL VENOUS BLD VENIPUNCTURE: CPT

## 2023-09-27 PROCEDURE — 82570 ASSAY OF URINE CREATININE: CPT

## 2023-10-03 DIAGNOSIS — Z79.4 TYPE 2 DIABETES MELLITUS WITH HYPERGLYCEMIA, WITH LONG-TERM CURRENT USE OF INSULIN: ICD-10-CM

## 2023-10-03 DIAGNOSIS — E11.65 TYPE 2 DIABETES MELLITUS WITH HYPERGLYCEMIA, WITH LONG-TERM CURRENT USE OF INSULIN: ICD-10-CM

## 2023-10-03 RX ORDER — INSULIN GLARGINE 100 [IU]/ML
INJECTION, SOLUTION SUBCUTANEOUS
Qty: 30 ML | Refills: 2 | Status: SHIPPED | OUTPATIENT
Start: 2023-10-03

## 2023-10-03 NOTE — TELEPHONE ENCOUNTER
Rx Refill Note  Requested Prescriptions     Pending Prescriptions Disp Refills    Lantus SoloStar 100 UNIT/ML injection pen [Pharmacy Med Name: Lantus SoloStar 100 UNIT/ML Subcutaneous Solution Pen-injector] 30 mL 0     Sig: INJECT 50 UNITS SUBCUTANEOUSLY TWICE DAILY      Last office visit with prescribing clinician: 9/12/2023   Last telemedicine visit with prescribing clinician: Visit date not found   Next office visit with prescribing clinician: 1/12/2024                         Would you like a call back once the refill request has been completed: [] Yes [] No    If the office needs to give you a call back, can they leave a voicemail: [] Yes [] No    Karlo Sifuentes MA  10/03/23, 15:01 CDT

## 2023-11-06 ENCOUNTER — OFFICE VISIT (OUTPATIENT)
Dept: INTERNAL MEDICINE | Facility: CLINIC | Age: 63
End: 2023-11-06
Payer: MEDICARE

## 2023-11-06 ENCOUNTER — HOSPITAL ENCOUNTER (OUTPATIENT)
Dept: GENERAL RADIOLOGY | Facility: HOSPITAL | Age: 63
Discharge: HOME | End: 2023-11-06
Admitting: NURSE PRACTITIONER
Payer: MEDICARE

## 2023-11-06 VITALS
DIASTOLIC BLOOD PRESSURE: 70 MMHG | BODY MASS INDEX: 30.88 KG/M2 | OXYGEN SATURATION: 97 % | HEART RATE: 112 BPM | HEIGHT: 66 IN | SYSTOLIC BLOOD PRESSURE: 142 MMHG

## 2023-11-06 DIAGNOSIS — F32.A ANXIETY AND DEPRESSION: ICD-10-CM

## 2023-11-06 DIAGNOSIS — J40 BRONCHITIS: Primary | ICD-10-CM

## 2023-11-06 DIAGNOSIS — R06.00 DYSPNEA, UNSPECIFIED TYPE: ICD-10-CM

## 2023-11-06 DIAGNOSIS — J40 BRONCHITIS: ICD-10-CM

## 2023-11-06 DIAGNOSIS — R01.1 HEART MURMUR: ICD-10-CM

## 2023-11-06 DIAGNOSIS — F41.9 ANXIETY AND DEPRESSION: ICD-10-CM

## 2023-11-06 PROCEDURE — 99214 OFFICE O/P EST MOD 30 MIN: CPT | Performed by: NURSE PRACTITIONER

## 2023-11-06 PROCEDURE — 3078F DIAST BP <80 MM HG: CPT | Performed by: NURSE PRACTITIONER

## 2023-11-06 PROCEDURE — 3052F HG A1C>EQUAL 8.0%<EQUAL 9.0%: CPT | Performed by: NURSE PRACTITIONER

## 2023-11-06 PROCEDURE — 3077F SYST BP >= 140 MM HG: CPT | Performed by: NURSE PRACTITIONER

## 2023-11-06 PROCEDURE — 71046 X-RAY EXAM CHEST 2 VIEWS: CPT

## 2023-11-06 RX ORDER — DEXTROMETHORPHAN HYDROBROMIDE AND PROMETHAZINE HYDROCHLORIDE 15; 6.25 MG/5ML; MG/5ML
5 SYRUP ORAL 4 TIMES DAILY PRN
Qty: 240 ML | Refills: 0 | Status: SHIPPED | OUTPATIENT
Start: 2023-11-06

## 2023-11-06 RX ORDER — ESCITALOPRAM OXALATE 5 MG/1
5 TABLET ORAL DAILY
Qty: 30 TABLET | Refills: 0 | Status: SHIPPED | OUTPATIENT
Start: 2023-11-06

## 2023-11-06 RX ORDER — CEFDINIR 300 MG/1
300 CAPSULE ORAL 2 TIMES DAILY
Qty: 14 CAPSULE | Refills: 0 | Status: SHIPPED | OUTPATIENT
Start: 2023-11-06

## 2023-11-06 NOTE — PROGRESS NOTES
Chief Complaint   Patient presents with    Headache    Nasal Congestion    Cough     For over 1 month         History:  Soledad Ram is a 63 y.o. female who presents today for evaluation of the above problems.          1) Cough for over a month. Not sure about fever. Sore throat. Sinus congestion and pressure. Feels short of breath with cough, and coughing so hard she has thrown up. No chest pain or extremity swelling.    2) Keeps her grandchildren daily and is exhausted. No help around the house. Takes trazodone for sleep but feels she is getting more anxious and depressed. Has tried Wellbutrin without help in the past.    Headache  Cough  Associated symptoms include headaches.       ROS:  Review of Systems   Respiratory:  Positive for cough.    Neurological:  Positive for headaches.   As above      Allergies   Allergen Reactions    Hydrochlorothiazide Other (See Comments)    Lisinopril Swelling     FACE SWELLS    Erythromycin Rash     Past Medical History:   Diagnosis Date    Diabetes mellitus     Elevated cholesterol     Hypertension     Infection      Past Surgical History:   Procedure Laterality Date    BACK SURGERY      CARPAL TUNNEL RELEASE      COLONOSCOPY N/A 4/18/2022    Procedure: COLONOSCOPY WITH ANESTHESIA;  Surgeon: Simba Rai DO;  [facility] ENDOSCOPY;  Service: Gastroenterology;  Laterality: N/A;  pre altered bowel habits   post normal  KIRTI Sarabia MD        TONSILLECTOMY      TUBAL ABDOMINAL LIGATION       Family History   Problem Relation Age of Onset    Lung cancer Mother     Lung cancer Father     Lung cancer Sister     Heart disease Paternal Grandfather     Breast cancer Neg Hx     Colon cancer Neg Hx     Colon polyps Neg Hx     Esophageal cancer Neg Hx      Soledad  reports that she has never smoked. She has never used smokeless tobacco. She reports that she does not drink alcohol and does not use drugs.    I have reviewed and updated the above documentation (if  necessary) including but not limited to chief complaint, ROS, PFSH, allergies and medications        Current Outpatient Medications:     albuterol sulfate  (90 Base) MCG/ACT inhaler, Inhale 2 puffs Every 4 (Four) Hours As Needed for Wheezing or Shortness of Air (rinse mouth after use)., Disp: 6.7 g, Rfl: 0    Continuous Blood Gluc  (Dexcom G6 ) device, 1 each Daily., Disp: 1 each, Rfl: 5    Continuous Blood Gluc Sensor (Dexcom G6 Sensor), Every 10 (Ten) Days., Disp: 4 each, Rfl: 11    Continuous Blood Gluc Transmit (Dexcom G6 Transmitter) misc, 1 application Every 10 (Ten) Days., Disp: 1 each, Rfl: 5    cyclobenzaprine (FLEXERIL) 10 MG tablet, Take 1 tablet by mouth 2 (Two) Times a Day As Needed for Muscle Spasms., Disp: , Rfl:     DULoxetine (CYMBALTA) 60 MG capsule, Take 1 capsule by mouth 2 (Two) Times a Day., Disp: , Rfl:     empagliflozin (Jardiance) 25 MG tablet tablet, Take 1 tablet by mouth Daily., Disp: 30 tablet, Rfl: 2    fluticasone (Flonase) 50 MCG/ACT nasal spray, 2 sprays into the nostril(s) as directed by provider Daily., Disp: 16 g, Rfl: 0    gabapentin (NEURONTIN) 600 MG tablet, Take 1 tablet by mouth Every 8 (Eight) Hours As Needed., Disp: , Rfl:     glimepiride (Amaryl) 4 MG tablet, Take 1 tablet by mouth Every Morning Before Breakfast., Disp: 90 tablet, Rfl: 3    Insulin Lispro, 1 Unit Dial, (HumaLOG KwikPen) 100 UNIT/ML solution pen-injector, Inject 15 Units under the skin into the appropriate area as directed 3 (Three) Times a Day Before Meals., Disp: 15 mL, Rfl: 5    Lantus SoloStar 100 UNIT/ML injection pen, INJECT 50 UNITS SUBCUTANEOUSLY TWICE DAILY, Disp: 30 mL, Rfl: 2    losartan (COZAAR) 50 MG tablet, Take 1 tablet by mouth Daily., Disp: 90 tablet, Rfl: 3    meclizine (ANTIVERT) 25 MG tablet, Take 1 tablet by mouth 3 (Three) Times a Day As Needed for Dizziness., Disp: 30 tablet, Rfl: 0    oxyCODONE-acetaminophen (PERCOCET)  MG per tablet, Take 1 tablet by  "mouth Every 6 (Six) Hours As Needed for Moderate Pain., Disp: , Rfl:     polyethylene glycol (MIRALAX) 17 g packet, Take 17 g by mouth Daily., Disp: 30 packet, Rfl: 5    rosuvastatin (Crestor) 20 MG tablet, Take 1 tablet by mouth Daily., Disp: 90 tablet, Rfl: 3    traZODone (DESYREL) 50 MG tablet, Take 3 tablets by mouth Every Night., Disp: 90 tablet, Rfl: 11    cefdinir (OMNICEF) 300 MG capsule, Take 1 capsule by mouth 2 (Two) Times a Day., Disp: 14 capsule, Rfl: 0    escitalopram (Lexapro) 5 MG tablet, Take 1 tablet by mouth Daily., Disp: 30 tablet, Rfl: 0    promethazine-dextromethorphan (PROMETHAZINE-DM) 6.25-15 MG/5ML syrup, Take 5 mL by mouth 4 (Four) Times a Day As Needed for Cough., Disp: 240 mL, Rfl: 0    OBJECTIVE:  Visit Vitals  /70 (BP Location: Left arm, Patient Position: Sitting, Cuff Size: Adult)   Pulse 112   Ht 167.6 cm (66\")   SpO2 97%   BMI 30.88 kg/m²      Physical Exam  Vitals and nursing note reviewed.   Constitutional:       General: She is not in acute distress.     Appearance: Normal appearance. She is ill-appearing. She is not toxic-appearing or diaphoretic.   Cardiovascular:      Rate and Rhythm: Regular rhythm. Tachycardia present.      Heart sounds: Murmur (2/6 systolic murmur loudest RUSB) heard.   Pulmonary:      Effort: Pulmonary effort is normal. No respiratory distress.      Comments: Congestion LLL, decreased breath sounds RLL, violently coughing at times. Seems slightly dyspneic but color is good and pulse ox good.  Abdominal:      Palpations: Abdomen is soft.   Musculoskeletal:         General: No tenderness.      Right lower leg: No edema.      Left lower leg: No edema.   Skin:     General: Skin is warm and dry.   Neurological:      Mental Status: She is alert and oriented to person, place, and time.   Psychiatric:         Behavior: Behavior normal.         Thought Content: Thought content normal.         Judgment: Judgment normal.      Comments: crying "         [facility]      Assessment/Plan    Diagnoses and all orders for this visit:    1. Bronchitis (Primary)  -     cefdinir (OMNICEF) 300 MG capsule; Take 1 capsule by mouth 2 (Two) Times a Day.  Dispense: 14 capsule; Refill: 0  -     promethazine-dextromethorphan (PROMETHAZINE-DM) 6.25-15 MG/5ML syrup; Take 5 mL by mouth 4 (Four) Times a Day As Needed for Cough.  Dispense: 240 mL; Refill: 0  -     XR Chest PA & Lateral; Future    2. Heart murmur  -     Adult Transthoracic Echo Complete W/ Cont if Necessary Per Protocol; Future    3. Dyspnea, unspecified type  -     Adult Transthoracic Echo Complete W/ Cont if Necessary Per Protocol; Future    4. Anxiety and depression  -     escitalopram (Lexapro) 5 MG tablet; Take 1 tablet by mouth Daily.  Dispense: 30 tablet; Refill: 0      Work-up as above. Begin Lexapro. Follow up 2 weeks.         Education materials and an After Visit Summary were printed and given to the patient at discharge.  Return in about 2 weeks (around 11/20/2023) for follow up with Dr. Sarabia.         Gracia Tran, APRN   15:01 CST  11/6/2023

## 2023-11-07 ENCOUNTER — HOSPITAL ENCOUNTER (OUTPATIENT)
Dept: CARDIOLOGY | Facility: HOSPITAL | Age: 63
Discharge: HOME | End: 2023-11-07
Admitting: NURSE PRACTITIONER
Payer: MEDICARE

## 2023-11-07 VITALS
HEIGHT: 66 IN | BODY MASS INDEX: 30.7 KG/M2 | SYSTOLIC BLOOD PRESSURE: 142 MMHG | DIASTOLIC BLOOD PRESSURE: 70 MMHG | WEIGHT: 191 LBS

## 2023-11-07 DIAGNOSIS — R01.1 HEART MURMUR: ICD-10-CM

## 2023-11-07 DIAGNOSIS — R06.00 DYSPNEA, UNSPECIFIED TYPE: ICD-10-CM

## 2023-11-07 PROCEDURE — 93306 TTE W/DOPPLER COMPLETE: CPT

## 2023-11-08 LAB
BH CV ECHO MEAS - AO MAX PG: 10.9 MMHG
BH CV ECHO MEAS - AO MEAN PG: 8 MMHG
BH CV ECHO MEAS - AO ROOT DIAM: 3.1 CM
BH CV ECHO MEAS - AO V2 MAX: 165 CM/SEC
BH CV ECHO MEAS - AO V2 VTI: 34.6 CM
BH CV ECHO MEAS - AVA(I,D): 3.3 CM2
BH CV ECHO MEAS - EDV(CUBED): 54.9 ML
BH CV ECHO MEAS - EDV(MOD-SP4): 44 ML
BH CV ECHO MEAS - EF(MOD-SP4): 65.9 %
BH CV ECHO MEAS - ESV(CUBED): 13.8 ML
BH CV ECHO MEAS - ESV(MOD-SP4): 15 ML
BH CV ECHO MEAS - FS: 36.8 %
BH CV ECHO MEAS - IVS/LVPW: 1 CM
BH CV ECHO MEAS - IVSD: 1.4 CM
BH CV ECHO MEAS - LA DIMENSION: 3.5 CM
BH CV ECHO MEAS - LV DIASTOLIC VOL/BSA (35-75): 22.4 CM2
BH CV ECHO MEAS - LV MASS(C)D: 194.1 GRAMS
BH CV ECHO MEAS - LV MAX PG: 8.5 MMHG
BH CV ECHO MEAS - LV MEAN PG: 4 MMHG
BH CV ECHO MEAS - LV SYSTOLIC VOL/BSA (12-30): 7.6 CM2
BH CV ECHO MEAS - LV V1 MAX: 146 CM/SEC
BH CV ECHO MEAS - LV V1 VTI: 27.6 CM
BH CV ECHO MEAS - LVIDD: 3.8 CM
BH CV ECHO MEAS - LVIDS: 2.4 CM
BH CV ECHO MEAS - LVOT AREA: 4.2 CM2
BH CV ECHO MEAS - LVOT DIAM: 2.3 CM
BH CV ECHO MEAS - LVPWD: 1.4 CM
BH CV ECHO MEAS - MV A MAX VEL: 119 CM/SEC
BH CV ECHO MEAS - MV DEC TIME: 0.14 SEC
BH CV ECHO MEAS - MV E MAX VEL: 63.1 CM/SEC
BH CV ECHO MEAS - MV E/A: 0.53
BH CV ECHO MEAS - PA V2 MAX: 124 CM/SEC
BH CV ECHO MEAS - RAP SYSTOLE: 10 MMHG
BH CV ECHO MEAS - RVSP: 15.6 MMHG
BH CV ECHO MEAS - SI(MOD-SP4): 14.8 ML/M2
BH CV ECHO MEAS - SV(LVOT): 114.7 ML
BH CV ECHO MEAS - SV(MOD-SP4): 29 ML
BH CV ECHO MEAS - TR MAX PG: 5.6 MMHG
BH CV ECHO MEAS - TR MAX VEL: 118 CM/SEC
LEFT ATRIUM VOLUME INDEX: 16.8 ML/M2
LEFT ATRIUM VOLUME: 33 ML

## 2023-11-13 ENCOUNTER — TELEPHONE (OUTPATIENT)
Dept: INTERNAL MEDICINE | Facility: CLINIC | Age: 63
End: 2023-11-13

## 2023-11-13 RX ORDER — METHYLPREDNISOLONE 4 MG/1
TABLET ORAL
Qty: 1 EACH | Refills: 0 | Status: SHIPPED | OUTPATIENT
Start: 2023-11-13

## 2023-11-13 NOTE — TELEPHONE ENCOUNTER
Caller: Soledad Ram    Relationship: Self    Best call back number: [phone]     Who are you requesting to speak with (clinical staff, provider,  specific staff member): CLINICAL STAFF    What was the call regarding: PATIENT STATES SHE IS STILL EXPERIENCING WATERY MUCUS, RIGHT EAR PAIN, AND A COUGH. SHE IS REQUESTING CALLBACK FROM JD FAGAN REGARDING THIS.

## 2023-11-13 NOTE — TELEPHONE ENCOUNTER
PATIENT HAS BEEN CALLED, SHE STATED THAT SHE WAS SEEN 1 WEEK AGO,   SHE STATED THAT HER COUGH IS BETTER BUT HER EARS ARE STILL STOPPED UP AND HAVING DIFFICULTY HEARING.  SHE STATED WHEN BLOWING HER NOSE HER EARS BUBBLE.    SHE STATED THAT SHE WILL BE LEAVING TO GO OUT OF TOWN THIS FRIDAY FOR 1 WEEK.

## 2023-11-26 DIAGNOSIS — F41.9 ANXIETY AND DEPRESSION: ICD-10-CM

## 2023-11-26 DIAGNOSIS — F32.A ANXIETY AND DEPRESSION: ICD-10-CM

## 2023-11-27 RX ORDER — ESCITALOPRAM OXALATE 5 MG/1
5 TABLET ORAL DAILY
Qty: 30 TABLET | Refills: 0 | OUTPATIENT
Start: 2023-11-27

## 2023-11-27 NOTE — TELEPHONE ENCOUNTER
Rx Refill Note  Requested Prescriptions     Pending Prescriptions Disp Refills    escitalopram (LEXAPRO) 5 MG tablet [Pharmacy Med Name: Escitalopram Oxalate 5 MG Oral Tablet] 30 tablet 0     Sig: Take 1 tablet by mouth once daily      Last office visit with prescribing clinician: 11/6/2023   Last telemedicine visit with prescribing clinician: Visit date not found   Next office visit with prescribing clinician: Visit date not found                         Would you like a call back once the refill request has been completed: [] Yes [] No    If the office needs to give you a call back, can they leave a voicemail: [] Yes [] No    Karlo Sifuentes MA  11/27/23, 08:39 CST

## 2023-11-27 NOTE — TELEPHONE ENCOUNTER
PATIENT HAS BEEN CALLED, SHE STATED THAT SHE IS TAKING CYMBALTA AND LEXAPRO.  SHE STATED THAT SHE TAKES THE CYMBALTA FOR THE NEUROPATHY IN HER LEGS.   CYMBALTA HAS BEEN PRESCRIBED BY ANGEL FERGUSON WITH DR. BRADFORD.

## 2023-12-09 DIAGNOSIS — Z79.4 TYPE 2 DIABETES MELLITUS WITH HYPERGLYCEMIA, WITH LONG-TERM CURRENT USE OF INSULIN: ICD-10-CM

## 2023-12-09 DIAGNOSIS — E11.65 TYPE 2 DIABETES MELLITUS WITH HYPERGLYCEMIA, WITH LONG-TERM CURRENT USE OF INSULIN: ICD-10-CM

## 2023-12-11 NOTE — TELEPHONE ENCOUNTER
Rx Refill Note  Requested Prescriptions     Pending Prescriptions Disp Refills    Jardiance 25 MG tablet tablet [Pharmacy Med Name: Jardiance 25 MG Oral Tablet] 30 tablet 0     Sig: Take 1 tablet by mouth once daily      Last office visit with prescribing clinician: 9/12/2023   Last telemedicine visit with prescribing clinician: Visit date not found   Next office visit with prescribing clinician: 1/12/2024                         Would you like a call back once the refill request has been completed: [] Yes [] No    If the office needs to give you a call back, can they leave a voicemail: [] Yes [] No    Karlo Sifuentes MA  12/11/23, 08:53 CST

## 2023-12-12 ENCOUNTER — TELEPHONE (OUTPATIENT)
Dept: INTERNAL MEDICINE | Facility: CLINIC | Age: 63
End: 2023-12-12

## 2023-12-12 RX ORDER — BUSPIRONE HYDROCHLORIDE 10 MG/1
10 TABLET ORAL 3 TIMES DAILY
Qty: 90 TABLET | Refills: 3 | Status: SHIPPED | OUTPATIENT
Start: 2023-12-12

## 2023-12-12 NOTE — TELEPHONE ENCOUNTER
Caller: Soledad Ram    Relationship: Self    Best call back number: [phone]     What medication are you requesting: SOMETHING FOR ANXIETY    What are your current symptoms: FEELS LIKE SHE WANTS TO CRY ALL THE TIME, SHE DOESN'T WANT TO BE AROUND ANYONE    How long have you been experiencing symptoms: A WHILE    Have you had these symptoms before:    [x] Yes  [] No    Have you been treated for these symptoms before:   [x] Yes  [] No    If a prescription is needed, what is your preferred pharmacy and phone number: [facility] [address] [place] [address] - [address] [facility] [phone] [facility] [phone]      Additional notes: DR HINDS TOOK PATIENT OFF LEXAPRO ABOUT TWO WEEKS AGO BECAUSE SHE COULDN'T TAKE IT WITH ANOTHER MEDICATION SHE IS ON. SHE STATES SINCE SHE HAS BEEN OFF IT HER ANXIETY HAS GOTTEN REALLY BAD. SHE IS WANTING SOMETHING ELSE PRESCRIBED FOR HER. PLEASE CALL BACK WHEN SOMETHING IS CALLED IN.

## 2024-01-12 ENCOUNTER — OFFICE VISIT (OUTPATIENT)
Dept: INTERNAL MEDICINE | Facility: CLINIC | Age: 64
End: 2024-01-12
Payer: MEDICARE

## 2024-01-12 VITALS
BODY MASS INDEX: 30.89 KG/M2 | OXYGEN SATURATION: 98 % | WEIGHT: 192.2 LBS | HEIGHT: 66 IN | HEART RATE: 88 BPM | DIASTOLIC BLOOD PRESSURE: 80 MMHG | TEMPERATURE: 97.3 F | SYSTOLIC BLOOD PRESSURE: 140 MMHG

## 2024-01-12 DIAGNOSIS — M79.642 LEFT HAND PAIN: ICD-10-CM

## 2024-01-12 DIAGNOSIS — I10 ESSENTIAL HYPERTENSION: ICD-10-CM

## 2024-01-12 DIAGNOSIS — E11.65 TYPE 2 DIABETES MELLITUS WITH HYPERGLYCEMIA, WITH LONG-TERM CURRENT USE OF INSULIN: Primary | ICD-10-CM

## 2024-01-12 DIAGNOSIS — Z79.4 TYPE 2 DIABETES MELLITUS WITH HYPERGLYCEMIA, WITH LONG-TERM CURRENT USE OF INSULIN: Primary | ICD-10-CM

## 2024-01-12 DIAGNOSIS — H01.004 BLEPHARITIS OF LEFT UPPER EYELID, UNSPECIFIED TYPE: ICD-10-CM
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
## 2024-01-12 LAB
EXPIRATION DATE: ABNORMAL
HBA1C MFR BLD: 9 % (ref 4.5–5.7)
Lab: ABNORMAL

## 2024-01-12 RX ORDER — INSULIN LISPRO 100 [IU]/ML
22 INJECTION, SOLUTION INTRAVENOUS; SUBCUTANEOUS
Qty: 15 ML | Refills: 5 | Status: SHIPPED | OUTPATIENT
Start: 2024-01-12

## 2024-01-12 NOTE — PATIENT INSTRUCTIONS
-Increase Lantus to 50 units twice a day  -Increase Humalong 22 units three times a day before meals.  -Check your blood pressure daily for the next 2 weeks. Keep a log and let me know the results of your blood pressure readings at the end of 2 weeks.

## 2024-01-12 NOTE — PROGRESS NOTES
Chief Complaint   Patient presents with    Follow-up     4 months    Diabetes     A1c=9.0 , reports taking 40 units of insulin BID       History:  Soledad Ram is a 64 y.o. female who presents today for evaluation of the above problems.      4-month follow-up on diabetes.  A1c today is 9.0%.  On September 27, 2023 it was 8.6%.    She is currently taking Jardiance 25 mg daily, glimepiride 4 mg daily, Lantus 40 units twice a day although the prescribed dose is 50 units twice a day, and Humalog 15 units 3 times a day.    She has a Dexcom with .  However, she is unable to see her blood sugar results.  She does not have the anne on her phone.    She is going to start exercising at [employer] as they recently acquired some exercise equipment.    Blood pressure is good at home.  States her increased blood pressure today is secondary to the bad weather and drivers on the road.    For the last couple months she has had left fifth finger soreness and decreased range of motion.  This started couple days after her dog made her fall.  She did fall on the left side including her hand.    This morning she woke up with swollen left upper eyelid.  She used a friend's tanning bed yesterday and did not wear eye protection.    HPI      ROS:  Review of Systems    As above      Current Outpatient Medications:     albuterol sulfate  (90 Base) MCG/ACT inhaler, Inhale 2 puffs Every 4 (Four) Hours As Needed for Wheezing or Shortness of Air (rinse mouth after use)., Disp: 6.7 g, Rfl: 0    busPIRone (BUSPAR) 10 MG tablet, Take 1 tablet by mouth 3 (Three) Times a Day., Disp: 90 tablet, Rfl: 3    Continuous Blood Gluc  (Dexcom G6 ) device, 1 each Daily., Disp: 1 each, Rfl: 5    Continuous Blood Gluc Sensor (Dexcom G6 Sensor), Every 10 (Ten) Days., Disp: 4 each, Rfl: 11    Continuous Blood Gluc Transmit (Dexcom G6 Transmitter) misc, 1 application Every 10 (Ten) Days., Disp: 1 each, Rfl: 5    cyclobenzaprine  (FLEXERIL) 10 MG tablet, Take 1 tablet by mouth 2 (Two) Times a Day As Needed for Muscle Spasms., Disp: , Rfl:     DULoxetine (CYMBALTA) 60 MG capsule, Take 1 capsule by mouth 2 (Two) Times a Day., Disp: , Rfl:     empagliflozin (Jardiance) 25 MG tablet tablet, Take 1 tablet by mouth Daily., Disp: 30 tablet, Rfl: 11    fluticasone (Flonase) 50 MCG/ACT nasal spray, 2 sprays into the nostril(s) as directed by provider Daily., Disp: 16 g, Rfl: 0    gabapentin (NEURONTIN) 600 MG tablet, Take 1 tablet by mouth Every 8 (Eight) Hours As Needed., Disp: , Rfl:     glimepiride (Amaryl) 4 MG tablet, Take 1 tablet by mouth Every Morning Before Breakfast., Disp: 90 tablet, Rfl: 3    Insulin Lispro, 1 Unit Dial, (HumaLOG KwikPen) 100 UNIT/ML solution pen-injector, Inject 22 Units under the skin into the appropriate area as directed 3 (Three) Times a Day Before Meals., Disp: 15 mL, Rfl: 5    Lantus SoloStar 100 UNIT/ML injection pen, INJECT 50 UNITS SUBCUTANEOUSLY TWICE DAILY, Disp: 30 mL, Rfl: 2    losartan (COZAAR) 50 MG tablet, Take 1 tablet by mouth Daily., Disp: 90 tablet, Rfl: 3    meclizine (ANTIVERT) 25 MG tablet, Take 1 tablet by mouth 3 (Three) Times a Day As Needed for Dizziness., Disp: 30 tablet, Rfl: 0    oxyCODONE-acetaminophen (PERCOCET)  MG per tablet, Take 1 tablet by mouth Every 6 (Six) Hours As Needed for Moderate Pain., Disp: , Rfl:     polyethylene glycol (MIRALAX) 17 g packet, Take 17 g by mouth Daily., Disp: 30 packet, Rfl: 5    rosuvastatin (Crestor) 20 MG tablet, Take 1 tablet by mouth Daily., Disp: 90 tablet, Rfl: 3    traZODone (DESYREL) 50 MG tablet, Take 3 tablets by mouth Every Night., Disp: 90 tablet, Rfl: 11    Lab Results   Component Value Date    GLUCOSE 268 (H) 09/27/2023    BUN 14 09/27/2023    CREATININE 0.71 09/27/2023    EGFR 96.9 09/27/2023    BCR 19.7 09/27/2023    K 4.3 09/27/2023    CO2 24.0 09/27/2023    CALCIUM 9.4 09/27/2023    ALBUMIN 4.1 09/27/2023    BILITOT 0.3 09/27/2023     AST 28 09/27/2023    ALT 31 09/27/2023       WBC   Date Value Ref Range Status   09/27/2023 9.43 3.40 - 10.80 10*3/mm3 Final     RBC   Date Value Ref Range Status   09/27/2023 4.92 3.77 - 5.28 10*6/mm3 Final     Hemoglobin   Date Value Ref Range Status   09/27/2023 14.6 12.0 - 15.9 g/dL Final     Hematocrit   Date Value Ref Range Status   09/27/2023 45.8 34.0 - 46.6 % Final     MCV   Date Value Ref Range Status   09/27/2023 93.1 79.0 - 97.0 fL Final     MCH   Date Value Ref Range Status   09/27/2023 29.7 26.6 - 33.0 pg Final     MCHC   Date Value Ref Range Status   09/27/2023 31.9 31.5 - 35.7 g/dL Final     RDW   Date Value Ref Range Status   09/27/2023 12.8 12.3 - 15.4 % Final     RDW-SD   Date Value Ref Range Status   09/27/2023 44.1 37.0 - 54.0 fl Final     MPV   Date Value Ref Range Status   09/27/2023 9.8 6.0 - 12.0 fL Final     Platelets   Date Value Ref Range Status   09/27/2023 280 140 - 450 10*3/mm3 Final     Neutrophil %   Date Value Ref Range Status   02/23/2022 52.6 42.7 - 76.0 % Final     Lymphocyte %   Date Value Ref Range Status   02/23/2022 27.5 19.6 - 45.3 % Final     Monocyte %   Date Value Ref Range Status   02/23/2022 8.0 5.0 - 12.0 % Final     Eosinophil %   Date Value Ref Range Status   02/23/2022 10.4 (H) 0.3 - 6.2 % Final     Basophil %   Date Value Ref Range Status   02/23/2022 1.2 0.0 - 1.5 % Final     Immature Grans %   Date Value Ref Range Status   02/23/2022 0.3 0.0 - 0.5 % Final     Neutrophils, Absolute   Date Value Ref Range Status   02/23/2022 5.31 1.70 - 7.00 10*3/mm3 Final     Lymphocytes, Absolute   Date Value Ref Range Status   02/23/2022 2.78 0.70 - 3.10 10*3/mm3 Final     Monocytes, Absolute   Date Value Ref Range Status   02/23/2022 0.81 0.10 - 0.90 10*3/mm3 Final     Eosinophils, Absolute   Date Value Ref Range Status   02/23/2022 1.05 (H) 0.00 - 0.40 10*3/mm3 Final     Basophils, Absolute   Date Value Ref Range Status   02/23/2022 0.12 0.00 - 0.20 10*3/mm3 Final     Immature  "Jennie, Absolute   Date Value Ref Range Status   02/23/2022 0.03 0.00 - 0.05 10*3/mm3 Final     nRBC   Date Value Ref Range Status   02/23/2022 0.0 0.0 - 0.2 /100 WBC Final         OBJECTIVE:  Visit Vitals  /80 (BP Location: Left arm, Patient Position: Sitting, Cuff Size: Adult)   Pulse 88   Temp 97.3 °F (36.3 °C) (Temporal)   Ht 167.6 cm (66\")   Wt 87.2 kg (192 lb 3.2 oz)   SpO2 98%   BMI 31.02 kg/m²      Physical Exam  Vitals and nursing note reviewed.   Constitutional:       General: She is not in acute distress.     Appearance: She is well-developed. She is not diaphoretic.      Comments: Pleasant   HENT:      Head: Normocephalic and atraumatic.   Eyes:      Pupils: Pupils are equal, round, and reactive to light.   Neck:      Thyroid: No thyromegaly.      Trachea: Phonation normal.   Pulmonary:      Effort: No respiratory distress.   Musculoskeletal:         General: Swelling and tenderness present. No deformity.        Hands:    Skin:     Coloration: Skin is not pale.      Findings: No erythema.   Neurological:      Mental Status: She is alert.   Psychiatric:         Behavior: Behavior normal.         Thought Content: Thought content normal.         Judgment: Judgment normal.         Assessment/Plan    Diagnoses and all orders for this visit:    1. Type 2 diabetes mellitus with hyperglycemia, with long-term current use of insulin (Primary)  -     POC Glycosylated Hemoglobin (Hb A1C)  -     Insulin Lispro, 1 Unit Dial, (HumaLOG KwikPen) 100 UNIT/ML solution pen-injector; Inject 22 Units under the skin into the appropriate area as directed 3 (Three) Times a Day Before Meals.  Dispense: 15 mL; Refill: 5    2. Essential hypertension    3. Blepharitis of left upper eyelid, unspecified type    4. Fall, initial encounter  -     XR Hand 3+ View Left; Future    5. Left hand pain  -     XR Hand 3+ View Left; Future      Soledad's diabetes has worsened with an A1c of 9.0%.  Recommend she increase her Lantus up to 50 " units twice a day.  Increase Humalog to 22 units 3 times a day before meals.    Recommend she keep an eye on her blood pressure at home and notify me with results in 2 weeks.    Recommend conservative treatment for blepharitis of the left upper eyelid.  Likely secondary to UV radiation from tanning bed yesterday with eye protection.    Obtain x-ray of her left hand for evaluation of the left fifth digit pain and decreased range of motion.      Return in about 3 months (around 4/12/2024) for Recheck.      AURORA Sarabia MD  08:27 CST  1/12/2024   Electronically signed

## 2024-01-15 ENCOUNTER — TELEPHONE (OUTPATIENT)
Dept: INTERNAL MEDICINE | Facility: CLINIC | Age: 64
End: 2024-01-15
Payer: MEDICARE

## 2024-01-15 NOTE — TELEPHONE ENCOUNTER
Caller: Soledad Ram    Relationship: Self    Best call back number: [phone]    What medication are you requesting: SOMETHING FOR PINK EYE     What are your current symptoms: PINK EYE  SWOLLEN SHUT     How long have you been experiencing symptoms: STARTED YESTERDAY       If a prescription is needed, what is your preferred pharmacy and phone number: [facility] [address] [place] [address] - [address] [facility] ([place], [place]) [phone] [facility] [phone] FX

## 2024-01-16 ENCOUNTER — OFFICE VISIT (OUTPATIENT)
Dept: INTERNAL MEDICINE | Facility: CLINIC | Age: 64
End: 2024-01-16
Payer: MEDICARE

## 2024-01-16 ENCOUNTER — HOSPITAL ENCOUNTER (OUTPATIENT)
Dept: GENERAL RADIOLOGY | Facility: HOSPITAL | Age: 64
Discharge: HOME | End: 2024-01-16
Admitting: INTERNAL MEDICINE
Payer: MEDICARE

## 2024-01-16 VITALS
DIASTOLIC BLOOD PRESSURE: 76 MMHG | OXYGEN SATURATION: 98 % | TEMPERATURE: 97.6 F | HEART RATE: 87 BPM | WEIGHT: 192 LBS | SYSTOLIC BLOOD PRESSURE: 130 MMHG | HEIGHT: 66 IN | BODY MASS INDEX: 30.86 KG/M2
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
## 2024-01-16 DIAGNOSIS — H10.9 BACTERIAL CONJUNCTIVITIS OF LEFT EYE: Primary | ICD-10-CM

## 2024-01-16 DIAGNOSIS — M79.642 LEFT HAND PAIN: ICD-10-CM

## 2024-01-16 PROCEDURE — 73130 X-RAY EXAM OF HAND: CPT

## 2024-01-16 PROCEDURE — 3078F DIAST BP <80 MM HG: CPT | Performed by: INTERNAL MEDICINE

## 2024-01-16 PROCEDURE — 99213 OFFICE O/P EST LOW 20 MIN: CPT | Performed by: INTERNAL MEDICINE

## 2024-01-16 PROCEDURE — 3075F SYST BP GE 130 - 139MM HG: CPT | Performed by: INTERNAL MEDICINE

## 2024-01-16 PROCEDURE — 3052F HG A1C>EQUAL 8.0%<EQUAL 9.0%: CPT | Performed by: INTERNAL MEDICINE

## 2024-01-16 RX ORDER — POLYMYXIN B SULFATE AND TRIMETHOPRIM 1; 10000 MG/ML; [USP'U]/ML
1 SOLUTION OPHTHALMIC EVERY 6 HOURS
Qty: 10 ML | Refills: 0 | Status: SHIPPED | OUTPATIENT
Start: 2024-01-16

## 2024-01-16 NOTE — PROGRESS NOTES
"Chief Complaint   Patient presents with    Eye Problem     Reports possible \"pink eye\". Drainage to left eye as well.         History:  Soledad Ram is a 64 y.o. female who presents today for evaluation of the above problems.      Soledad presents today for an acute visit.  She was seen in the office on January 12, 2024.  She indicated that the she was experiencing some left eye itchiness that occurred 1 day after going to the tanning bed without eye protection.  Since then her symptoms have worsened.  She has a foreign body sensation and feeling like there is sand in her eye.  She has itchiness and photophobia.  She has a green discharge from the left eye and it is matted in the morning.  Decreased vision related to the film on her eye.  She has been using warm compresses at home.      HPI      ROS:  Review of Systems  As above      Current Outpatient Medications:     albuterol sulfate  (90 Base) MCG/ACT inhaler, Inhale 2 puffs Every 4 (Four) Hours As Needed for Wheezing or Shortness of Air (rinse mouth after use)., Disp: 6.7 g, Rfl: 0    busPIRone (BUSPAR) 10 MG tablet, Take 1 tablet by mouth 3 (Three) Times a Day., Disp: 90 tablet, Rfl: 3    Continuous Blood Gluc  (Dexcom G6 ) device, 1 each Daily., Disp: 1 each, Rfl: 5    Continuous Blood Gluc Sensor (Dexcom G6 Sensor), Every 10 (Ten) Days., Disp: 4 each, Rfl: 11    Continuous Blood Gluc Transmit (Dexcom G6 Transmitter) misc, 1 application Every 10 (Ten) Days., Disp: 1 each, Rfl: 5    cyclobenzaprine (FLEXERIL) 10 MG tablet, Take 1 tablet by mouth 2 (Two) Times a Day As Needed for Muscle Spasms., Disp: , Rfl:     DULoxetine (CYMBALTA) 60 MG capsule, Take 1 capsule by mouth 2 (Two) Times a Day., Disp: , Rfl:     empagliflozin (Jardiance) 25 MG tablet tablet, Take 1 tablet by mouth Daily., Disp: 30 tablet, Rfl: 11    fluticasone (Flonase) 50 MCG/ACT nasal spray, 2 sprays into the nostril(s) as directed by provider Daily., Disp: 16 g, Rfl: " 0    gabapentin (NEURONTIN) 600 MG tablet, Take 1 tablet by mouth Every 8 (Eight) Hours As Needed., Disp: , Rfl:     glimepiride (Amaryl) 4 MG tablet, Take 1 tablet by mouth Every Morning Before Breakfast., Disp: 90 tablet, Rfl: 3    Insulin Lispro, 1 Unit Dial, (HumaLOG KwikPen) 100 UNIT/ML solution pen-injector, Inject 22 Units under the skin into the appropriate area as directed 3 (Three) Times a Day Before Meals., Disp: 15 mL, Rfl: 5    Lantus SoloStar 100 UNIT/ML injection pen, INJECT 50 UNITS SUBCUTANEOUSLY TWICE DAILY, Disp: 30 mL, Rfl: 2    losartan (COZAAR) 50 MG tablet, Take 1 tablet by mouth Daily., Disp: 90 tablet, Rfl: 3    meclizine (ANTIVERT) 25 MG tablet, Take 1 tablet by mouth 3 (Three) Times a Day As Needed for Dizziness., Disp: 30 tablet, Rfl: 0    oxyCODONE-acetaminophen (PERCOCET)  MG per tablet, Take 1 tablet by mouth Every 6 (Six) Hours As Needed for Moderate Pain., Disp: , Rfl:     polyethylene glycol (MIRALAX) 17 g packet, Take 17 g by mouth Daily., Disp: 30 packet, Rfl: 5    rosuvastatin (Crestor) 20 MG tablet, Take 1 tablet by mouth Daily., Disp: 90 tablet, Rfl: 3    traZODone (DESYREL) 50 MG tablet, Take 3 tablets by mouth Every Night., Disp: 90 tablet, Rfl: 11    trimethoprim-polymyxin b (POLYTRIM) 28304-0.1 UNIT/ML-% ophthalmic solution, Administer 1 drop into the left eye Every 6 (Six) Hours., Disp: 10 mL, Rfl: 0    Lab Results   Component Value Date    GLUCOSE 268 (H) 09/27/2023    BUN 14 09/27/2023    CREATININE 0.71 09/27/2023    EGFR 96.9 09/27/2023    BCR 19.7 09/27/2023    K 4.3 09/27/2023    CO2 24.0 09/27/2023    CALCIUM 9.4 09/27/2023    ALBUMIN 4.1 09/27/2023    BILITOT 0.3 09/27/2023    AST 28 09/27/2023    ALT 31 09/27/2023       WBC   Date Value Ref Range Status   09/27/2023 9.43 3.40 - 10.80 10*3/mm3 Final     RBC   Date Value Ref Range Status   09/27/2023 4.92 3.77 - 5.28 10*6/mm3 Final     Hemoglobin   Date Value Ref Range Status   09/27/2023 14.6 12.0 - 15.9 g/dL  Final     Hematocrit   Date Value Ref Range Status   09/27/2023 45.8 34.0 - 46.6 % Final     MCV   Date Value Ref Range Status   09/27/2023 93.1 79.0 - 97.0 fL Final     MCH   Date Value Ref Range Status   09/27/2023 29.7 26.6 - 33.0 pg Final     MCHC   Date Value Ref Range Status   09/27/2023 31.9 31.5 - 35.7 g/dL Final     RDW   Date Value Ref Range Status   09/27/2023 12.8 12.3 - 15.4 % Final     RDW-SD   Date Value Ref Range Status   09/27/2023 44.1 37.0 - 54.0 fl Final     MPV   Date Value Ref Range Status   09/27/2023 9.8 6.0 - 12.0 fL Final     Platelets   Date Value Ref Range Status   09/27/2023 280 140 - 450 10*3/mm3 Final     Neutrophil %   Date Value Ref Range Status   02/23/2022 52.6 42.7 - 76.0 % Final     Lymphocyte %   Date Value Ref Range Status   02/23/2022 27.5 19.6 - 45.3 % Final     Monocyte %   Date Value Ref Range Status   02/23/2022 8.0 5.0 - 12.0 % Final     Eosinophil %   Date Value Ref Range Status   02/23/2022 10.4 (H) 0.3 - 6.2 % Final     Basophil %   Date Value Ref Range Status   02/23/2022 1.2 0.0 - 1.5 % Final     Immature Grans %   Date Value Ref Range Status   02/23/2022 0.3 0.0 - 0.5 % Final     Neutrophils, Absolute   Date Value Ref Range Status   02/23/2022 5.31 1.70 - 7.00 10*3/mm3 Final     Lymphocytes, Absolute   Date Value Ref Range Status   02/23/2022 2.78 0.70 - 3.10 10*3/mm3 Final     Monocytes, Absolute   Date Value Ref Range Status   02/23/2022 0.81 0.10 - 0.90 10*3/mm3 Final     Eosinophils, Absolute   Date Value Ref Range Status   02/23/2022 1.05 (H) 0.00 - 0.40 10*3/mm3 Final     Basophils, Absolute   Date Value Ref Range Status   02/23/2022 0.12 0.00 - 0.20 10*3/mm3 Final     Immature Grans, Absolute   Date Value Ref Range Status   02/23/2022 0.03 0.00 - 0.05 10*3/mm3 Final     nRBC   Date Value Ref Range Status   02/23/2022 0.0 0.0 - 0.2 /100 WBC Final         OBJECTIVE:  Visit Vitals  /76 (BP Location: Left arm, Patient Position: Sitting, Cuff Size: Adult)  "  Pulse 87   Temp 97.6 °F (36.4 °C) (Temporal)   Ht 167.6 cm (66\")   Wt 87.1 kg (192 lb)   SpO2 98%   BMI 30.99 kg/m²      Physical Exam  Constitutional:       General: She is not in acute distress.     Appearance: She is well-developed. She is not diaphoretic.   HENT:      Head: Normocephalic and atraumatic.   Eyes:      Extraocular Movements: Extraocular movements intact.      Conjunctiva/sclera:      Right eye: Right conjunctiva is not injected. No chemosis, exudate or hemorrhage.     Left eye: Left conjunctiva is injected. Exudate present. No chemosis.     Pupils: Pupils are equal, round, and reactive to light.   Neck:      Thyroid: No thyromegaly.      Trachea: Phonation normal.   Pulmonary:      Effort: No respiratory distress.   Skin:     Coloration: Skin is not pale.      Findings: No erythema.   Neurological:      Mental Status: She is alert.   Psychiatric:         Behavior: Behavior normal.         Thought Content: Thought content normal.         Judgment: Judgment normal.         Assessment/Plan    Diagnoses and all orders for this visit:    1. Bacterial conjunctivitis of left eye (Primary)  -     trimethoprim-polymyxin b (POLYTRIM) 95018-9.1 UNIT/ML-% ophthalmic solution; Administer 1 drop into the left eye Every 6 (Six) Hours.  Dispense: 10 mL; Refill: 0      Soledad has bacterial conjunctivitis of her left eye.  Going to prescribe some antibiotic drops.  Recommend she continue warm compresses.  Let us know if symptoms persist or worsen.    Return for Next scheduled follow up.      AURORA Sarabia MD  09:10 CST  1/16/2024   Electronically signed  "

## 2024-01-23 ENCOUNTER — OFFICE VISIT (OUTPATIENT)
Dept: INTERNAL MEDICINE | Facility: CLINIC | Age: 64
End: 2024-01-23
Payer: MEDICARE

## 2024-01-23 VITALS
DIASTOLIC BLOOD PRESSURE: 81 MMHG | OXYGEN SATURATION: 96 % | WEIGHT: 186 LBS | SYSTOLIC BLOOD PRESSURE: 115 MMHG | HEART RATE: 75 BPM | TEMPERATURE: 98 F | RESPIRATION RATE: 16 BRPM | BODY MASS INDEX: 29.89 KG/M2 | HEIGHT: 66 IN

## 2024-01-23 DIAGNOSIS — J01.10 ACUTE NON-RECURRENT FRONTAL SINUSITIS: Primary | ICD-10-CM

## 2024-01-23 DIAGNOSIS — H10.023 OTHER MUCOPURULENT CONJUNCTIVITIS OF BOTH EYES: ICD-10-CM

## 2024-01-23 RX ORDER — AMOXICILLIN AND CLAVULANATE POTASSIUM 875; 125 MG/1; MG/1
1 TABLET, FILM COATED ORAL 2 TIMES DAILY
Qty: 14 TABLET | Refills: 0 | Status: SHIPPED | OUTPATIENT
Start: 2024-01-23 | End: 2024-01-30

## 2024-01-23 RX ORDER — METHYLPREDNISOLONE ACETATE 40 MG/ML
40 INJECTION, SUSPENSION INTRA-ARTICULAR; INTRALESIONAL; INTRAMUSCULAR; SOFT TISSUE ONCE
Status: COMPLETED | OUTPATIENT
Start: 2024-01-23 | End: 2024-01-23

## 2024-01-23 RX ADMIN — METHYLPREDNISOLONE ACETATE 40 MG: 40 INJECTION, SUSPENSION INTRA-ARTICULAR; INTRALESIONAL; INTRAMUSCULAR; SOFT TISSUE at 11:34

## 2024-01-23 NOTE — PROGRESS NOTES
"Chief Complaint  Conjunctivitis (Began in right eye and is now in left eye)    Subjective        Soledad Ram presents to [facility] for evaluation    History of Present Illness  Presents for continued conjunctivitis.  Was treated on January 16 with triemthoprim-polymyxin ointment. At that time only had left sided conjunctivitis. Now has progressed to both eyes despite using drops as directed. Symptoms began initally on Jan 12 after not using eye protection in the tanning bed. Reports that grandchild and daughter also have conjunctivitis. Denies eye pain or vision changes. Does report itches and yellow drainage, especially in the morning. Does not wear contacts.   For the past week has had worsening sinus drainage and facial pressure. Has been using otc antihistamines with no relief of symptoms.     ROS:  Review of Systems  Constitutional: Negative for chills and fever.  HENT: Negative for sore throat.  Positive for congestion, sinus pressure.   Eyes: Negative for visual disturbance. Otherwise as noted per HPI  Respiratory: Negative for cough and shortness of breath.    Cardiovascular: Negative for chest pain and palpitations.  Gastrointestinal: Negative for abdominal pain, constipation and nausea.  Endocrine: Negative for cold intolerance and heat intolerance.  Genitourinary: Negative for difficulty urinating and frequency.  Musculoskeletal: Negative for arthralgias and back pain.  Skin: Negative for rash.  Neurological: Negative for dizziness and headaches.  Psychiatric/Behavioral: Negative for dysphoric mood. The patient is not nervous/anxious.      Objective   Vital Signs:  /81 (BP Location: Left arm, Patient Position: Sitting, Cuff Size: Adult)   Pulse 75   Temp 98 °F (36.7 °C) (Temporal)   Resp 16   Ht 167.6 cm (66\")   Wt 84.4 kg (186 lb)   SpO2 96%   BMI 30.02 kg/m²   Estimated body mass index is 30.02 kg/m² as calculated from the following:    Height as of " "this encounter: 167.6 cm (66\").    Weight as of this encounter: 84.4 kg (186 lb).           Physical Exam  Vitals reviewed.   Constitutional:       General: She is not in acute distress.     Appearance: Normal appearance. She is not ill-appearing.   HENT:      Head: Normocephalic and atraumatic.      Right Ear: Tympanic membrane is erythematous and bulging.      Left Ear: Tympanic membrane is erythematous and bulging.      Nose: Congestion and rhinorrhea present.      Right Sinus: Frontal sinus tenderness present. No maxillary sinus tenderness.      Left Sinus: Frontal sinus tenderness present. No maxillary sinus tenderness.   Eyes:      General: Lids are normal. Gaze aligned appropriately. No allergic shiner.        Right eye: Discharge (clear) present.         Left eye: Discharge (clear) present.     Extraocular Movements: Extraocular movements intact.      Conjunctiva/sclera:      Right eye: Right conjunctiva is injected.      Left eye: Left conjunctiva is injected.   Neck:      Thyroid: No thyroid mass, thyromegaly or thyroid tenderness.      Trachea: Trachea and phonation normal.   Cardiovascular:      Rate and Rhythm: Normal rate and regular rhythm.      Pulses: Normal pulses.      Heart sounds: Normal heart sounds. No murmur heard.     No friction rub. No gallop.   Pulmonary:      Effort: Pulmonary effort is normal. No respiratory distress.      Breath sounds: Normal breath sounds. No wheezing.   Musculoskeletal:         General: Normal range of motion.      Cervical back: Normal range of motion and neck supple.   Lymphadenopathy:      Cervical: Cervical adenopathy present.   Skin:     General: Skin is warm and dry.      Capillary Refill: Capillary refill takes less than 2 seconds.   Neurological:      General: No focal deficit present.      Mental Status: She is alert.   Psychiatric:         Mood and Affect: Mood normal.         Behavior: Behavior normal.         Thought Content: Thought content normal.    "      Judgment: Judgment normal.        Result Review :  The following data was reviewed by: MARTY Botello on 01/23/2024:  CMP          9/27/2023    08:28   CMP   Glucose 268    BUN 14    Creatinine 0.71    EGFR 96.9    Sodium 141    Potassium 4.3    Chloride 104    Calcium 9.4    Total Protein 7.1    Albumin 4.1    Globulin 3.0    Total Bilirubin 0.3    Alkaline Phosphatase 122    AST (SGOT) 28    ALT (SGPT) 31    Albumin/Globulin Ratio 1.4    BUN/Creatinine Ratio 19.7    Anion Gap 13.0      CBC          9/27/2023    08:28   CBC   WBC 9.43    RBC 4.92    Hemoglobin 14.6    Hematocrit 45.8    MCV 93.1    MCH 29.7    MCHC 31.9    RDW 12.8    Platelets 280      Lipid Panel          9/27/2023    08:28   Lipid Panel   Total Cholesterol 144    Triglycerides 237    HDL Cholesterol 45    VLDL Cholesterol 38    LDL Cholesterol  61    LDL/HDL Ratio 1.15        Most Recent A1C          1/12/2024    08:37   HGBA1C Most Recent   Hemoglobin A1C 9.0      Reviewed previous office notes.              Assessment and Plan   Diagnoses and all orders for this visit:    1. Acute non-recurrent frontal sinusitis (Primary)  Comments:  Tx with antibiotics since present 7 days  Continue otc meds   Monitor glucose with steroids  Orders:  -     methylPREDNISolone acetate (DEPO-medrol) injection 40 mg  -     amoxicillin-clavulanate (AUGMENTIN) 875-125 MG per tablet; Take 1 tablet by mouth 2 (Two) Times a Day for 7 days.  Dispense: 14 tablet; Refill: 0    2. Other mucopurulent conjunctivitis of both eyes  Comments:  Stop polymixin drops. Start cipro ointment.   Contact precautions.    Other orders  -     ciprofloxacin (CILOXAN) 0.3 % ophthalmic ointment; Administer  to both eyes 2 (Two) Times a Day.  Dispense: 3.5 g; Refill: 0             Follow Up   Return for Recheck. As needed; keep scheduled follow up appointment.   Patient was given instructions and counseling regarding her condition or for health maintenance advice. Please see  specific information pulled into the AVS if appropriate.

## 2024-02-23 DIAGNOSIS — E11.65 TYPE 2 DIABETES MELLITUS WITH HYPERGLYCEMIA, WITH LONG-TERM CURRENT USE OF INSULIN: ICD-10-CM

## 2024-02-23 DIAGNOSIS — Z79.4 TYPE 2 DIABETES MELLITUS WITH HYPERGLYCEMIA, WITH LONG-TERM CURRENT USE OF INSULIN: ICD-10-CM

## 2024-02-23 RX ORDER — INSULIN GLARGINE 100 [IU]/ML
50 INJECTION, SOLUTION SUBCUTANEOUS 2 TIMES DAILY
Qty: 30 ML | Refills: 5 | Status: SHIPPED | OUTPATIENT
Start: 2024-02-23

## 2024-02-23 NOTE — TELEPHONE ENCOUNTER
Rx Refill Note  Requested Prescriptions     Pending Prescriptions Disp Refills    Lantus SoloStar 100 UNIT/ML injection pen 30 mL 2      Last office visit with prescribing clinician: 1/16/2024   Last telemedicine visit with prescribing clinician: Visit date not found   Next office visit with prescribing clinician: 4/12/2024                         Would you like a call back once the refill request has been completed: [] Yes [] No    If the office needs to give you a call back, can they leave a voicemail: [] Yes [] No    Yoko Gonzales, JUAN  02/23/24, 10:47 CST      Patient reports being out of [facility].

## 2024-03-07 ENCOUNTER — TELEPHONE (OUTPATIENT)
Dept: INTERNAL MEDICINE | Facility: CLINIC | Age: 64
End: 2024-03-07
Payer: MEDICARE

## 2024-03-07 NOTE — TELEPHONE ENCOUNTER
Patient called the office complaining of sinus issues-onset x 1 week. Reports symptoms of drainage-no color noted, sinus pressure and headache.    Per patient prone to getting sinus infections and unable to sleep. Reports treating symptoms with OTC meds such as: Allegra, Claritin, Flonase, mucinex no relief. Wondering if a prescription can be called in to pharmacy.

## 2024-03-22 ENCOUNTER — TELEPHONE (OUTPATIENT)
Dept: INTERNAL MEDICINE | Facility: CLINIC | Age: 64
End: 2024-03-22
Payer: MEDICARE

## 2024-03-22 DIAGNOSIS — E11.65 TYPE 2 DIABETES MELLITUS WITH HYPERGLYCEMIA, WITH LONG-TERM CURRENT USE OF INSULIN: ICD-10-CM

## 2024-03-22 DIAGNOSIS — Z79.4 TYPE 2 DIABETES MELLITUS WITH HYPERGLYCEMIA, WITH LONG-TERM CURRENT USE OF INSULIN: ICD-10-CM

## 2024-03-22 RX ORDER — GLIMEPIRIDE 4 MG/1
4 TABLET ORAL
Qty: 90 TABLET | Refills: 3 | Status: SHIPPED | OUTPATIENT
Start: 2024-03-22

## 2024-03-22 RX ORDER — PROCHLORPERAZINE 25 MG/1
SUPPOSITORY RECTAL
Qty: 4 EACH | Refills: 11 | Status: SHIPPED | OUTPATIENT
Start: 2024-03-22

## 2024-03-22 NOTE — TELEPHONE ENCOUNTER
Rx Refill Note  Requested Prescriptions     Pending Prescriptions Disp Refills    glimepiride (Amaryl) 4 MG tablet 90 tablet 3     Sig: Take 1 tablet by mouth Every Morning Before Breakfast.    Continuous Blood Gluc Sensor (Dexcom G6 Sensor) 4 each 11     Sig: Use Every 10 (Ten) Days.      Last office visit with prescribing clinician: 1/16/2024   Last telemedicine visit with prescribing clinician: Visit date not found   Next office visit with prescribing clinician: 4/12/2024                         Would you like a call back once the refill request has been completed: [] Yes [] No    If the office needs to give you a call back, can they leave a voicemail: [] Yes [] No    JUAN Ward  03/22/24, 12:02 CDT

## 2024-03-26 DIAGNOSIS — E11.65 TYPE 2 DIABETES MELLITUS WITH HYPERGLYCEMIA, WITH LONG-TERM CURRENT USE OF INSULIN: ICD-10-CM

## 2024-03-26 DIAGNOSIS — Z79.4 TYPE 2 DIABETES MELLITUS WITH HYPERGLYCEMIA, WITH LONG-TERM CURRENT USE OF INSULIN: ICD-10-CM

## 2024-03-26 RX ORDER — PROCHLORPERAZINE 25 MG/1
1 SUPPOSITORY RECTAL
Qty: 1 EACH | Refills: 5 | Status: SHIPPED | OUTPATIENT
Start: 2024-03-26

## 2024-03-26 NOTE — TELEPHONE ENCOUNTER
Rx Refill Note  Requested Prescriptions     Pending Prescriptions Disp Refills    Continuous Blood Gluc Transmit (Dexcom G6 Transmitter) misc 1 each 5     Sig: Use 1 application Every 10 (Ten) Days.      Last office visit with prescribing clinician: 1/16/2024   Last telemedicine visit with prescribing clinician: Visit date not found   Next office visit with prescribing clinician: 4/12/2024                         Would you like a call back once the refill request has been completed: [] Yes [] No    If the office needs to give you a call back, can they leave a voicemail: [] Yes [] No    JUAN Ward  03/26/24, 13:20 CDT

## 2024-04-12 ENCOUNTER — OFFICE VISIT (OUTPATIENT)
Dept: INTERNAL MEDICINE | Facility: CLINIC | Age: 64
End: 2024-04-12
Payer: MEDICARE

## 2024-04-12 VITALS
HEART RATE: 88 BPM | TEMPERATURE: 96.9 F | SYSTOLIC BLOOD PRESSURE: 120 MMHG | BODY MASS INDEX: 30.79 KG/M2 | DIASTOLIC BLOOD PRESSURE: 70 MMHG | WEIGHT: 191.6 LBS | HEIGHT: 66 IN | OXYGEN SATURATION: 98 %

## 2024-04-12 DIAGNOSIS — I10 ESSENTIAL HYPERTENSION: ICD-10-CM

## 2024-04-12 DIAGNOSIS — Z79.4 TYPE 2 DIABETES MELLITUS WITH HYPERGLYCEMIA, WITH LONG-TERM CURRENT USE OF INSULIN: Primary | ICD-10-CM

## 2024-04-12 DIAGNOSIS — E11.65 TYPE 2 DIABETES MELLITUS WITH HYPERGLYCEMIA, WITH LONG-TERM CURRENT USE OF INSULIN: Primary | ICD-10-CM

## 2024-04-12 DIAGNOSIS — F41.9 ANXIETY: ICD-10-CM

## 2024-04-12 DIAGNOSIS — E78.5 HYPERLIPIDEMIA, UNSPECIFIED HYPERLIPIDEMIA TYPE: ICD-10-CM

## 2024-04-12 LAB
EXPIRATION DATE: ABNORMAL
HBA1C MFR BLD: 8.4 % (ref 4.5–5.7)
Lab: ABNORMAL

## 2024-04-12 RX ORDER — AMPICILLIN TRIHYDRATE 250 MG
500 CAPSULE ORAL 2 TIMES DAILY
COMMUNITY

## 2024-04-12 RX ORDER — INSULIN LISPRO 100 [IU]/ML
30 INJECTION, SOLUTION INTRAVENOUS; SUBCUTANEOUS
Qty: 15 ML | Refills: 5 | Status: SHIPPED | OUTPATIENT
Start: 2024-04-12

## 2024-04-12 RX ORDER — BUSPIRONE HYDROCHLORIDE 10 MG/1
20 TABLET ORAL 3 TIMES DAILY
Qty: 180 TABLET | Refills: 5 | Status: SHIPPED | OUTPATIENT
Start: 2024-04-12

## 2024-04-12 RX ORDER — INSULIN GLARGINE 100 [IU]/ML
55 INJECTION, SOLUTION SUBCUTANEOUS 2 TIMES DAILY
Qty: 30 ML | Refills: 5 | Status: SHIPPED | OUTPATIENT
Start: 2024-04-12

## 2024-04-12 NOTE — PROGRESS NOTES
Chief Complaint   Patient presents with    Follow-up     3 month    Diabetes     A1c=8.4    Depression     Reports taking 20 mg of buspar-increased dose per patient         History:  Soledad Ram is a 64 y.o. female who presents today for evaluation of the above problems.      3 months ago Lantus increased to 50 units twice a day and Humalog 22 units 3 times a day before meals.  3 months ago her A1c was 9.0%.  Today it is 8.4%.  She is also on Amaryl and Jardiance.  We had insurance coverage issues with a GLP-1 agonist.    She was having increased anxiety and depression so she increased her BuSpar to 20 mg 3 times a day from 10 mg 3 times a day.  She is feeling much better.  She is exercising with her [employer] group couple times per week.    She has no chest pains palpitations or shortness of breath.    HPI  PHQ-9 Depression Screening  Little interest or pleasure in doing things? 0-->not at all   Feeling down, depressed, or hopeless? 0-->not at all   Trouble falling or staying asleep, or sleeping too much?     Feeling tired or having little energy?     Poor appetite or overeating?     Feeling bad about yourself - or that you are a failure or have let yourself or your family down?     Trouble concentrating on things, such as reading the newspaper or watching television?     Moving or speaking so slowly that other people could have noticed? Or the opposite - being so fidgety or restless that you have been moving around a lot more than usual?     Thoughts that you would be better off dead, or of hurting yourself in some way?     PHQ-9 Total Score 0   If you checked off any problems, how difficult have these problems made it for you to do your work, take care of things at home, or get along with other people?             ROS:  Review of Systems  See above    Current Outpatient Medications:     albuterol sulfate  (90 Base) MCG/ACT inhaler, Inhale 2 puffs Every 4 (Four) Hours As Needed for Wheezing or Shortness  of Air (rinse mouth after use)., Disp: 6.7 g, Rfl: 0    busPIRone (BUSPAR) 10 MG tablet, Take 2 tablets by mouth 3 (Three) Times a Day., Disp: 180 tablet, Rfl: 5    Cinnamon 500 MG capsule, Take 1 capsule by mouth 2 (Two) Times a Day., Disp: , Rfl:     Continuous Blood Gluc  (Dexcom G6 ) device, 1 each Daily., Disp: 1 each, Rfl: 5    Continuous Blood Gluc Sensor (Dexcom G6 Sensor), Use Every 10 (Ten) Days., Disp: 4 each, Rfl: 11    Continuous Blood Gluc Transmit (Dexcom G6 Transmitter) misc, Use 1 application Every 10 (Ten) Days., Disp: 1 each, Rfl: 5    cyclobenzaprine (FLEXERIL) 10 MG tablet, Take 1 tablet by mouth 2 (Two) Times a Day As Needed for Muscle Spasms., Disp: , Rfl:     DULoxetine (CYMBALTA) 60 MG capsule, Take 1 capsule by mouth 2 (Two) Times a Day., Disp: , Rfl:     empagliflozin (Jardiance) 25 MG tablet tablet, Take 1 tablet by mouth Daily., Disp: 30 tablet, Rfl: 11    fluticasone (Flonase) 50 MCG/ACT nasal spray, 2 sprays into the nostril(s) as directed by provider Daily., Disp: 16 g, Rfl: 0    gabapentin (NEURONTIN) 600 MG tablet, Take 1 tablet by mouth Every 8 (Eight) Hours As Needed., Disp: , Rfl:     glimepiride (Amaryl) 4 MG tablet, Take 1 tablet by mouth Every Morning Before Breakfast., Disp: 90 tablet, Rfl: 3    Insulin Lispro, 1 Unit Dial, (HumaLOG KwikPen) 100 UNIT/ML solution pen-injector, Inject 30 Units under the skin into the appropriate area as directed 3 (Three) Times a Day Before Meals., Disp: 15 mL, Rfl: 5    Lantus SoloStar 100 UNIT/ML injection pen, Inject 55 Units under the skin into the appropriate area as directed 2 (Two) Times a Day., Disp: 30 mL, Rfl: 5    losartan (COZAAR) 50 MG tablet, Take 1 tablet by mouth Daily., Disp: 90 tablet, Rfl: 3    meclizine (ANTIVERT) 25 MG tablet, Take 1 tablet by mouth 3 (Three) Times a Day As Needed for Dizziness., Disp: 30 tablet, Rfl: 0    oxyCODONE-acetaminophen (PERCOCET)  MG per tablet, Take 1 tablet by mouth Every  6 (Six) Hours As Needed for Moderate Pain., Disp: , Rfl:     polyethylene glycol (MIRALAX) 17 g packet, Take 17 g by mouth Daily., Disp: 30 packet, Rfl: 5    rosuvastatin (Crestor) 20 MG tablet, Take 1 tablet by mouth Daily., Disp: 90 tablet, Rfl: 3    traZODone (DESYREL) 50 MG tablet, Take 3 tablets by mouth Every Night., Disp: 90 tablet, Rfl: 11    Lab Results   Component Value Date    GLUCOSE 268 (H) 09/27/2023    BUN 14 09/27/2023    CREATININE 0.71 09/27/2023    EGFR 96.9 09/27/2023    BCR 19.7 09/27/2023    K 4.3 09/27/2023    CO2 24.0 09/27/2023    CALCIUM 9.4 09/27/2023    ALBUMIN 4.1 09/27/2023    BILITOT 0.3 09/27/2023    AST 28 09/27/2023    ALT 31 09/27/2023       WBC   Date Value Ref Range Status   09/27/2023 9.43 3.40 - 10.80 10*3/mm3 Final     RBC   Date Value Ref Range Status   09/27/2023 4.92 3.77 - 5.28 10*6/mm3 Final     Hemoglobin   Date Value Ref Range Status   09/27/2023 14.6 12.0 - 15.9 g/dL Final     Hematocrit   Date Value Ref Range Status   09/27/2023 45.8 34.0 - 46.6 % Final     MCV   Date Value Ref Range Status   09/27/2023 93.1 79.0 - 97.0 fL Final     MCH   Date Value Ref Range Status   09/27/2023 29.7 26.6 - 33.0 pg Final     MCHC   Date Value Ref Range Status   09/27/2023 31.9 31.5 - 35.7 g/dL Final     RDW   Date Value Ref Range Status   09/27/2023 12.8 12.3 - 15.4 % Final     RDW-SD   Date Value Ref Range Status   09/27/2023 44.1 37.0 - 54.0 fl Final     MPV   Date Value Ref Range Status   09/27/2023 9.8 6.0 - 12.0 fL Final     Platelets   Date Value Ref Range Status   09/27/2023 280 140 - 450 10*3/mm3 Final     Neutrophil %   Date Value Ref Range Status   02/23/2022 52.6 42.7 - 76.0 % Final     Lymphocyte %   Date Value Ref Range Status   02/23/2022 27.5 19.6 - 45.3 % Final     Monocyte %   Date Value Ref Range Status   02/23/2022 8.0 5.0 - 12.0 % Final     Eosinophil %   Date Value Ref Range Status   02/23/2022 10.4 (H) 0.3 - 6.2 % Final     Basophil %   Date Value Ref Range  "Status   02/23/2022 1.2 0.0 - 1.5 % Final     Immature Grans %   Date Value Ref Range Status   02/23/2022 0.3 0.0 - 0.5 % Final     Neutrophils, Absolute   Date Value Ref Range Status   02/23/2022 5.31 1.70 - 7.00 10*3/mm3 Final     Lymphocytes, Absolute   Date Value Ref Range Status   02/23/2022 2.78 0.70 - 3.10 10*3/mm3 Final     Monocytes, Absolute   Date Value Ref Range Status   02/23/2022 0.81 0.10 - 0.90 10*3/mm3 Final     Eosinophils, Absolute   Date Value Ref Range Status   02/23/2022 1.05 (H) 0.00 - 0.40 10*3/mm3 Final     Basophils, Absolute   Date Value Ref Range Status   02/23/2022 0.12 0.00 - 0.20 10*3/mm3 Final     Immature Grans, Absolute   Date Value Ref Range Status   02/23/2022 0.03 0.00 - 0.05 10*3/mm3 Final     nRBC   Date Value Ref Range Status   02/23/2022 0.0 0.0 - 0.2 /100 WBC Final         OBJECTIVE:  Visit Vitals  /70 (BP Location: Left arm, Patient Position: Sitting, Cuff Size: Adult)   Pulse 88   Temp 96.9 °F (36.1 °C) (Temporal)   Ht 167.6 cm (66\")   Wt 86.9 kg (191 lb 9.6 oz)   SpO2 98%   BMI 30.93 kg/m²      Physical Exam  Vitals and nursing note reviewed.   Constitutional:       General: She is not in acute distress.     Appearance: Normal appearance. She is well-developed. She is not diaphoretic.      Comments: Very pleasant   HENT:      Head: Normocephalic and atraumatic.   Eyes:      Pupils: Pupils are equal, round, and reactive to light.   Neck:      Thyroid: No thyromegaly.      Trachea: Phonation normal.   Pulmonary:      Effort: No respiratory distress.   Skin:     Coloration: Skin is not pale.      Findings: No erythema.   Neurological:      Mental Status: She is alert.   Psychiatric:         Behavior: Behavior normal.         Thought Content: Thought content normal.         Judgment: Judgment normal.         Assessment/Plan    Diagnoses and all orders for this visit:    1. Type 2 diabetes mellitus with hyperglycemia, with long-term current use of insulin (Primary)  -    "  POC Glycosylated Hemoglobin (Hb A1C)  -     Lantus SoloStar 100 UNIT/ML injection pen; Inject 55 Units under the skin into the appropriate area as directed 2 (Two) Times a Day.  Dispense: 30 mL; Refill: 5  -     Insulin Lispro, 1 Unit Dial, (HumaLOG KwikPen) 100 UNIT/ML solution pen-injector; Inject 30 Units under the skin into the appropriate area as directed 3 (Three) Times a Day Before Meals.  Dispense: 15 mL; Refill: 5    2. Essential hypertension    3. Hyperlipidemia, unspecified hyperlipidemia type    4. Anxiety  -     busPIRone (BUSPAR) 10 MG tablet; Take 2 tablets by mouth 3 (Three) Times a Day.  Dispense: 180 tablet; Refill: 5      Overall, Soledad is doing very well.  Her A1c has improved.  However, it is still above our goal.  I will increase her Lantus to 55 units twice a day and her Humalog to 30 units 3 times a day before meals.    Blood pressure is well-controlled.  Continue current regimen.    I will increase her BuSpar to 20 mg 3 times a day to reflect what she is taking at home.    Follow-up in 6 months for her annual Medicare wellness visit or sooner if indicated.    Return in about 6 months (around 10/12/2024) for Medicare Wellness with me.      AURORA Sarabia MD  08:00 CDT  4/12/2024   Electronically signed

## 2024-06-30 DIAGNOSIS — Z79.4 TYPE 2 DIABETES MELLITUS WITH HYPERGLYCEMIA, WITH LONG-TERM CURRENT USE OF INSULIN: ICD-10-CM

## 2024-06-30 DIAGNOSIS — E11.65 TYPE 2 DIABETES MELLITUS WITH HYPERGLYCEMIA, WITH LONG-TERM CURRENT USE OF INSULIN: ICD-10-CM

## 2024-07-01 RX ORDER — PROCHLORPERAZINE 25 MG/1
1 SUPPOSITORY RECTAL SEE ADMIN INSTRUCTIONS
Qty: 1 EACH | Refills: 3 | Status: SHIPPED | OUTPATIENT
Start: 2024-07-01

## 2024-07-01 NOTE — TELEPHONE ENCOUNTER
Rx Refill Note  Requested Prescriptions     Pending Prescriptions Disp Refills    Continuous Glucose  (Dexcom G6 ) device [Pharmacy Med Name: DEXCOM G6   MIS] 1 each 0     Sig: USE AS DIRECTED      Last office visit with prescribing clinician: 4/12/2024   Last telemedicine visit with prescribing clinician: Visit date not found   Next office visit with prescribing clinician: 10/21/2024                         Would you like a call back once the refill request has been completed: [] Yes [] No    If the office needs to give you a call back, can they leave a voicemail: [] Yes [] No    Karlo Sifuentes MA  07/01/24, 09:46 CDT

## 2024-07-02 ENCOUNTER — TELEPHONE (OUTPATIENT)
Dept: INTERNAL MEDICINE | Facility: CLINIC | Age: 64
End: 2024-07-02
Payer: MEDICARE

## 2024-07-02 NOTE — TELEPHONE ENCOUNTER
Patient called voicing concerns about the cost of Jardiance 25 mg being $600. I attempted a PA and received the following message: Available without authorization. The member is able to fill the requested drug at the pharmacy. If coverage is still needed or requesting prior to the expiration of a current authorization, a request can be made by sending a fax or calling the number on the back of the member's ID card.    She can't afford this and wondering if this can be changed to something else. Reports took last pill today.

## 2024-07-02 NOTE — TELEPHONE ENCOUNTER
Patient was given number for [facility] and advised to keep the office notified if she was able to receive any help with jardiance.

## 2024-07-02 NOTE — TELEPHONE ENCOUNTER
I called patient about her doing her own PA for Dexcom . She understands that our office needs to submit this not patient due to clinical notes will be needed . This would delay if notes did not get submitted

## 2024-07-02 NOTE — TELEPHONE ENCOUNTER
She has had this problem previously either with this medication or a different antihyperglycemic.  I would recommend she contact [facility] to see if she can receive any assistance.  If she cannot receive assistance we may need to make adjustment in her insulin.

## 2024-07-02 NOTE — TELEPHONE ENCOUNTER
Tried to start PA for dexcom G6  through cover my meds and was not able to complete due to being submitted by patient earlier. I called patient's insurance at [phone], provided ref # [id number], spoke to Sindi with the determination department and provided clinical questions.    Will need to provide additional clinical notes to fax number [phone]. I was advised that  is changed every 1 per 720 days.

## 2024-07-09 ENCOUNTER — TELEPHONE (OUTPATIENT)
Dept: INTERNAL MEDICINE | Facility: CLINIC | Age: 64
End: 2024-07-09
Payer: MEDICARE

## 2024-07-09 NOTE — TELEPHONE ENCOUNTER
Caller: Soledad Ram    Relationship: Self    Best call back number:[phone]     What medication are you requesting: REPLACING   empagliflozin (Jardiance) 25 MG tablet tablet     How long have you been experiencing symptoms: INSURANCE WILL NOT COVER MEDICATION COST $400    If a prescription is needed, what is your preferred pharmacy and phone number: [facility] [address] [place] [address] - [address] [facility] [phone] [facility] [phone] FX

## 2024-07-09 NOTE — TELEPHONE ENCOUNTER
PATIENT HAS RETURNED CALL, SHE STATED THAT SHE HAS LEFT MESSAGES FOR [facility] TO RETURN CALL.  SHE HAS YET TO GET A PHONE CALL FROM THEM.

## 2024-08-12 DIAGNOSIS — E11.65 TYPE 2 DIABETES MELLITUS WITH HYPERGLYCEMIA, WITH LONG-TERM CURRENT USE OF INSULIN: ICD-10-CM

## 2024-08-12 DIAGNOSIS — Z79.4 TYPE 2 DIABETES MELLITUS WITH HYPERGLYCEMIA, WITH LONG-TERM CURRENT USE OF INSULIN: ICD-10-CM

## 2024-08-12 RX ORDER — INSULIN LISPRO 100 [IU]/ML
INJECTION, SOLUTION INTRAVENOUS; SUBCUTANEOUS
Qty: 27 ML | Refills: 0 | OUTPATIENT
Start: 2024-08-12

## 2024-08-13 DIAGNOSIS — E11.65 TYPE 2 DIABETES MELLITUS WITH HYPERGLYCEMIA, WITH LONG-TERM CURRENT USE OF INSULIN: ICD-10-CM

## 2024-08-13 DIAGNOSIS — Z79.4 TYPE 2 DIABETES MELLITUS WITH HYPERGLYCEMIA, WITH LONG-TERM CURRENT USE OF INSULIN: ICD-10-CM

## 2024-08-13 RX ORDER — INSULIN LISPRO 100 [IU]/ML
30 INJECTION, SOLUTION INTRAVENOUS; SUBCUTANEOUS
Qty: 30 ML | Refills: 1 | Status: SHIPPED | OUTPATIENT
Start: 2024-08-13

## 2024-08-13 NOTE — TELEPHONE ENCOUNTER
Patient is currently out of medication and requesting a refill on pended medication.  Last A1c completed 4/12/24: 8.4

## 2024-10-03 RX ORDER — TRAZODONE HYDROCHLORIDE 50 MG/1
50 TABLET, FILM COATED ORAL NIGHTLY
Qty: 90 TABLET | Refills: 1 | Status: SHIPPED | OUTPATIENT
Start: 2024-10-03

## 2024-10-03 NOTE — TELEPHONE ENCOUNTER
Rx Refill Note  Requested Prescriptions     Pending Prescriptions Disp Refills    traZODone (DESYREL) 50 MG tablet [Pharmacy Med Name: traZODone HCl 50 MG Oral Tablet] 90 tablet 0     Sig: TAKE 3 TABLETS BY MOUTH ONCE DAILY AT NIGHT      Last office visit with prescribing clinician: 4/12/2024   Last telemedicine visit with prescribing clinician: Visit date not found   Next office visit with prescribing clinician: 10/21/2024                         Would you like a call back once the refill request has been completed: [] Yes [] No    If the office needs to give you a call back, can they leave a voicemail: [] Yes [] No    Karlo Sifuentes MA  10/03/24, 08:24 CDT

## 2024-10-06 DIAGNOSIS — E11.65 TYPE 2 DIABETES MELLITUS WITH HYPERGLYCEMIA, WITH LONG-TERM CURRENT USE OF INSULIN: ICD-10-CM

## 2024-10-06 DIAGNOSIS — Z79.4 TYPE 2 DIABETES MELLITUS WITH HYPERGLYCEMIA, WITH LONG-TERM CURRENT USE OF INSULIN: ICD-10-CM

## 2024-10-07 RX ORDER — INSULIN GLARGINE 100 [IU]/ML
50 INJECTION, SOLUTION SUBCUTANEOUS 2 TIMES DAILY
Qty: 30 ML | Refills: 3 | Status: SHIPPED | OUTPATIENT
Start: 2024-10-07

## 2024-10-07 NOTE — TELEPHONE ENCOUNTER
Rx Refill Note  Requested Prescriptions     Pending Prescriptions Disp Refills    Lantus SoloStar 100 UNIT/ML injection pen [Pharmacy Med Name: Lantus SoloStar 100 UNIT/ML Subcutaneous Solution Pen-injector] 30 mL 0     Sig: INJECT 50 UNITS SUBCUTANEOUSLY TWICE DAILY      Last office visit with prescribing clinician: 4/12/2024   Last telemedicine visit with prescribing clinician: Visit date not found   Next office visit with prescribing clinician: 10/21/2024                         Would you like a call back once the refill request has been completed: [] Yes [] No    If the office needs to give you a call back, can they leave a voicemail: [] Yes [] No    Karlo Sifuentes MA  10/07/24, 09:25 CDT

## 2024-10-25 ENCOUNTER — OFFICE VISIT (OUTPATIENT)
Dept: INTERNAL MEDICINE | Facility: CLINIC | Age: 64
End: 2024-10-25
Payer: MEDICARE

## 2024-10-25 ENCOUNTER — LAB (OUTPATIENT)
Dept: LAB | Facility: HOSPITAL | Age: 64
End: 2024-10-25
Payer: MEDICARE

## 2024-10-25 VITALS
SYSTOLIC BLOOD PRESSURE: 120 MMHG | TEMPERATURE: 98.2 F | HEIGHT: 66 IN | HEART RATE: 72 BPM | WEIGHT: 197 LBS | DIASTOLIC BLOOD PRESSURE: 80 MMHG | BODY MASS INDEX: 31.66 KG/M2

## 2024-10-25 DIAGNOSIS — Z00.00 MEDICARE ANNUAL WELLNESS VISIT, SUBSEQUENT: ICD-10-CM

## 2024-10-25 DIAGNOSIS — E11.65 TYPE 2 DIABETES MELLITUS WITH HYPERGLYCEMIA, WITH LONG-TERM CURRENT USE OF INSULIN: ICD-10-CM

## 2024-10-25 DIAGNOSIS — Z13.31 DEPRESSION SCREEN: ICD-10-CM

## 2024-10-25 DIAGNOSIS — E66.811 OBESITY (BMI 30.0-34.9): ICD-10-CM

## 2024-10-25 DIAGNOSIS — Z79.4 TYPE 2 DIABETES MELLITUS WITH HYPERGLYCEMIA, WITH LONG-TERM CURRENT USE OF INSULIN: ICD-10-CM

## 2024-10-25 DIAGNOSIS — Z12.31 ENCOUNTER FOR SCREENING MAMMOGRAM FOR MALIGNANT NEOPLASM OF BREAST: ICD-10-CM

## 2024-10-25 DIAGNOSIS — I10 ESSENTIAL HYPERTENSION: ICD-10-CM

## 2024-10-25 DIAGNOSIS — E78.5 HYPERLIPIDEMIA, UNSPECIFIED HYPERLIPIDEMIA TYPE: ICD-10-CM

## 2024-10-25 DIAGNOSIS — Z91.81 AT LOW RISK FOR FALL: ICD-10-CM

## 2024-10-25 DIAGNOSIS — Z23 NEED FOR IMMUNIZATION AGAINST INFLUENZA: ICD-10-CM

## 2024-10-25 DIAGNOSIS — Z00.00 MEDICARE ANNUAL WELLNESS VISIT, SUBSEQUENT: Primary | ICD-10-CM

## 2024-10-25 LAB
ALBUMIN SERPL-MCNC: 4.2 G/DL (ref 3.5–5.2)
ALBUMIN UR-MCNC: 1.4 MG/DL
ALBUMIN/GLOB SERPL: 1.4 G/DL
ALP SERPL-CCNC: 106 U/L (ref 39–117)
ALT SERPL W P-5'-P-CCNC: 24 U/L (ref 1–33)
ANION GAP SERPL CALCULATED.3IONS-SCNC: 7 MMOL/L (ref 5–15)
AST SERPL-CCNC: 35 U/L (ref 1–32)
BILIRUB SERPL-MCNC: 0.4 MG/DL (ref 0–1.2)
BUN SERPL-MCNC: 14 MG/DL (ref 8–23)
BUN/CREAT SERPL: 23 (ref 7–25)
CALCIUM SPEC-SCNC: 9.7 MG/DL (ref 8.6–10.5)
CHLORIDE SERPL-SCNC: 108 MMOL/L (ref 98–107)
CHOLEST SERPL-MCNC: 134 MG/DL (ref 0–200)
CO2 SERPL-SCNC: 29 MMOL/L (ref 22–29)
CREAT SERPL-MCNC: 0.61 MG/DL (ref 0.57–1)
CREAT UR-MCNC: 160.1 MG/DL
DEPRECATED RDW RBC AUTO: 43 FL (ref 37–54)
EGFRCR SERPLBLD CKD-EPI 2021: 101.2 ML/MIN/1.73
ERYTHROCYTE [DISTWIDTH] IN BLOOD BY AUTOMATED COUNT: 13.2 % (ref 12.3–15.4)
GLOBULIN UR ELPH-MCNC: 2.9 GM/DL
GLUCOSE SERPL-MCNC: 78 MG/DL (ref 65–99)
HBA1C MFR BLD: 7.3 % (ref 4.8–5.6)
HCT VFR BLD AUTO: 46 % (ref 34–46.6)
HDLC SERPL-MCNC: 51 MG/DL (ref 40–60)
HGB BLD-MCNC: 15.9 G/DL (ref 12–15.9)
LDLC SERPL CALC-MCNC: 63 MG/DL (ref 0–100)
LDLC/HDLC SERPL: 1.19 {RATIO}
MCH RBC QN AUTO: 31.1 PG (ref 26.6–33)
MCHC RBC AUTO-ENTMCNC: 34.6 G/DL (ref 31.5–35.7)
MCV RBC AUTO: 89.8 FL (ref 79–97)
MICROALBUMIN/CREAT UR: 8.7 MG/G (ref 0–29)
PLATELET # BLD AUTO: 279 10*3/MM3 (ref 140–450)
PMV BLD AUTO: 9.9 FL (ref 6–12)
POTASSIUM SERPL-SCNC: 4.2 MMOL/L (ref 3.5–5.2)
PROT SERPL-MCNC: 7.1 G/DL (ref 6–8.5)
RBC # BLD AUTO: 5.12 10*6/MM3 (ref 3.77–5.28)
SODIUM SERPL-SCNC: 144 MMOL/L (ref 136–145)
TRIGL SERPL-MCNC: 112 MG/DL (ref 0–150)
VLDLC SERPL-MCNC: 20 MG/DL (ref 5–40)
WBC NRBC COR # BLD AUTO: 10.3 10*3/MM3 (ref 3.4–10.8)

## 2024-10-25 PROCEDURE — 36415 COLL VENOUS BLD VENIPUNCTURE: CPT

## 2024-10-25 PROCEDURE — 80061 LIPID PANEL: CPT

## 2024-10-25 PROCEDURE — 82570 ASSAY OF URINE CREATININE: CPT

## 2024-10-25 PROCEDURE — 83036 HEMOGLOBIN GLYCOSYLATED A1C: CPT

## 2024-10-25 PROCEDURE — 80053 COMPREHEN METABOLIC PANEL: CPT

## 2024-10-25 PROCEDURE — 82043 UR ALBUMIN QUANTITATIVE: CPT

## 2024-10-25 PROCEDURE — 85027 COMPLETE CBC AUTOMATED: CPT

## 2024-10-25 RX ORDER — TRAZODONE HYDROCHLORIDE 50 MG/1
25 TABLET, FILM COATED ORAL NIGHTLY
Qty: 45 TABLET | Refills: 1 | Status: SHIPPED | OUTPATIENT
Start: 2024-10-25

## 2024-10-25 RX ORDER — ROSUVASTATIN CALCIUM 20 MG/1
20 TABLET, COATED ORAL DAILY
Qty: 90 TABLET | Refills: 3 | Status: SHIPPED | OUTPATIENT
Start: 2024-10-25

## 2024-10-25 RX ORDER — INSULIN LISPRO 100 [IU]/ML
30 INJECTION, SOLUTION INTRAVENOUS; SUBCUTANEOUS
Qty: 30 ML | Refills: 5 | Status: SHIPPED | OUTPATIENT
Start: 2024-10-25

## 2024-10-25 RX ORDER — LOSARTAN POTASSIUM 50 MG/1
50 TABLET ORAL DAILY
Qty: 90 TABLET | Refills: 3 | Status: SHIPPED | OUTPATIENT
Start: 2024-10-25

## 2024-10-25 RX ORDER — GLIMEPIRIDE 4 MG/1
4 TABLET ORAL
Qty: 90 TABLET | Refills: 3 | Status: SHIPPED | OUTPATIENT
Start: 2024-10-25

## 2024-10-25 NOTE — PROGRESS NOTES
Subjective   The ABCs of the Annual Wellness Visit  Medicare Wellness Visit      Soledad Ram is a 64 y.o. patient who presents for a Medicare Wellness Visit.  She denies any new issues or complaints.  Dexcom G6 helps control her blood sugar.  Takes insulin 4 times a day and checked in 4 times per day in the past.    She feels better mentally.  She has started exercising with her  playing pickle ball.    The following portions of the patient's history were reviewed and   updated as appropriate: allergies, current medications, past family history, past medical history, past social history, past surgical history, and problem list.    Compared to one year ago, the patient's physical   health is the same.  Compared to one year ago, the patient's mental   health is the same.    Recent Hospitalizations:  She was not admitted to the hospital during the last year.     Current Medical Providers:  Patient Care Team:  KIRTI Sarabia MD as PCP - General (Internal Medicine)    Outpatient Medications Prior to Visit   Medication Sig Dispense Refill    albuterol sulfate  (90 Base) MCG/ACT inhaler Inhale 2 puffs Every 4 (Four) Hours As Needed for Wheezing or Shortness of Air (rinse mouth after use). 6.7 g 0    busPIRone (BUSPAR) 10 MG tablet Take 2 tablets by mouth 3 (Three) Times a Day. 180 tablet 5    Cinnamon 500 MG capsule Take 1 capsule by mouth 2 (Two) Times a Day.      Continuous Blood Gluc Sensor (Dexcom G6 Sensor) Use Every 10 (Ten) Days. 4 each 11    Continuous Blood Gluc Transmit (Dexcom G6 Transmitter) misc Use 1 application Every 10 (Ten) Days. 1 each 5    Continuous Glucose  (Dexcom G6 ) device Inject 1 each under the skin into the appropriate area as directed See Admin Instructions. 1 each 3    cyclobenzaprine (FLEXERIL) 10 MG tablet Take 1 tablet by mouth 2 (Two) Times a Day As Needed for Muscle Spasms.      DULoxetine (CYMBALTA) 60 MG capsule Take 1 capsule by mouth 2 (Two)  Times a Day.      empagliflozin (Jardiance) 25 MG tablet tablet Take 1 tablet by mouth Daily. 30 tablet 11    fluticasone (Flonase) 50 MCG/ACT nasal spray 2 sprays into the nostril(s) as directed by provider Daily. 16 g 0    gabapentin (NEURONTIN) 600 MG tablet Take 1 tablet by mouth Every 8 (Eight) Hours As Needed.      Lantus SoloStar 100 UNIT/ML injection pen Inject 50 Units under the skin into the appropriate area as directed 2 (Two) Times a Day. (Patient taking differently: Inject 50 Units under the skin into the appropriate area as directed 2 (Two) Times a Day. Patient reports taking 55 units BID) 30 mL 3    meclizine (ANTIVERT) 25 MG tablet Take 1 tablet by mouth 3 (Three) Times a Day As Needed for Dizziness. 30 tablet 0    oxyCODONE-acetaminophen (PERCOCET)  MG per tablet Take 1 tablet by mouth Every 6 (Six) Hours As Needed for Moderate Pain.      polyethylene glycol (MIRALAX) 17 g packet Take 17 g by mouth Daily. 30 packet 5    glimepiride (Amaryl) 4 MG tablet Take 1 tablet by mouth Every Morning Before Breakfast. 90 tablet 3    Insulin Lispro, 1 Unit Dial, (HumaLOG KwikPen) 100 UNIT/ML solution pen-injector Inject 30 Units under the skin into the appropriate area as directed 3 (Three) Times a Day Before Meals. 30 mL 1    losartan (COZAAR) 50 MG tablet Take 1 tablet by mouth Daily. 90 tablet 3    rosuvastatin (Crestor) 20 MG tablet Take 1 tablet by mouth Daily. 90 tablet 3    traZODone (DESYREL) 50 MG tablet Take 1 tablet by mouth Every Night. 90 tablet 1     No facility-administered medications prior to visit.     Opioid medication/s are on active medication list.  and I have evaluated her active treatment plan and pain score trends (see table).  There were no vitals filed for this visit.  I have reviewed the chart for potential of high risk medication and harmful drug interactions in the elderly.        Aspirin is not on active medication list.  Aspirin use is not indicated based on review of  "current medical condition/s. Risk of harm outweighs potential benefits.  .    Patient Active Problem List   Diagnosis    Obesity (BMI 30.0-34.9)    Hyperlipidemia    Essential hypertension    Type 2 diabetes mellitus with hyperglycemia, with long-term current use of insulin    Chronic left-sided low back pain with left-sided sciatica    Chronic constipation    DDD (degenerative disc disease), lumbar    Depressive disorder    History of colonic polyps    Hypoalphalipoproteinemia    Hypomagnesemia    Insomnia    Leukocytosis    Lumbar radiculopathy    Microalbuminuria    Neuropathy    Other spondylosis with radiculopathy, lumbar region    Postmenopausal state    Encounter for long-term (current) use of insulin    Stricture of esophagus    Altered bowel function    Displacement of lumbar intervertebral disc without myelopathy    H/O Spinal surgery    Lumbosacral radiculitis    Postoperative pain    Radial styloid tenosynovitis of right hand    Spondylolisthesis    Thoracic radiculitis    Chronic pain disorder    Low back pain    Lumbago with sciatica    Synovial cyst of lumbar spine     Advance Care Planning Advance Directive is not on file.  ACP discussion was held with the patient during this visit. Patient has an advance directive (not in EMR), copy requested.            Objective   Vitals:    10/25/24 0932   BP: 120/80   BP Location: Left arm   Patient Position: Sitting   Cuff Size: Adult   Pulse: 72   Temp: 98.2 °F (36.8 °C)   TempSrc: Temporal   Weight: 89.4 kg (197 lb)   Height: 167.6 cm (66\")     Physical exam is unremarkable.  Estimated body mass index is 31.8 kg/m² as calculated from the following:    Height as of this encounter: 167.6 cm (66\").    Weight as of this encounter: 89.4 kg (197 lb).    BMI is >= 30 and <35. (Class 1 Obesity). The following options were offered after discussion;: weight loss educational material (shared in after visit summary), exercise counseling/recommendations, and nutrition " counseling/recommendations       Does the patient have evidence of cognitive impairment? No                                                                                               Health  Risk Assessment    Smoking Status:  Social History     Tobacco Use   Smoking Status Never   Smokeless Tobacco Never     Alcohol Consumption:  Social History     Substance and Sexual Activity   Alcohol Use No       Fall Risk Screen  STEADI Fall Risk Assessment was completed, and patient is at LOW risk for falls.Assessment completed on:10/25/2024    Depression Screening:      10/25/2024     9:33 AM   PHQ-2/PHQ-9 Depression Screening   Little interest or pleasure in doing things Not at all   Feeling down, depressed, or hopeless Not at all     Health Habits and Functional and Cognitive Screening:      10/25/2024     9:33 AM   Functional & Cognitive Status   Do you have difficulty preparing food and eating? No   Do you have difficulty bathing yourself, getting dressed or grooming yourself? No   Do you have difficulty using the toilet? No   Do you have difficulty moving around from place to place? No   Do you have trouble with steps or getting out of a bed or a chair? No   Current Diet Well Balanced Diet   Dental Exam Up to date   Eye Exam Up to date   Exercise (times per week) 2 times per week   Current Exercises Include Pickleball   Do you need help using the phone?  No   Are you deaf or do you have serious difficulty hearing?  No   Do you need help to go to places out of walking distance? No   Do you need help shopping? No   Do you need help preparing meals?  No   Do you need help with housework?  No   Do you need help with laundry? No   Do you need help taking your medications? No   Do you need help managing money? No   Do you ever drive or ride in a car without wearing a seat belt? No   Have you felt unusual stress, anger or loneliness in the last month? No   Who do you live with? Spouse   If you need help, do you have trouble  finding someone available to you? No   Have you been bothered in the last four weeks by sexual problems? No   Do you have difficulty concentrating, remembering or making decisions? No           Age-appropriate Screening Schedule:  Refer to the list below for future screening recommendations based on patient's age, sex and/or medical conditions. Orders for these recommended tests are listed in the plan section. The patient has been provided with a written plan.    Health Maintenance List  Health Maintenance   Topic Date Due    DIABETIC FOOT EXAM  Never done    DIABETIC EYE EXAM  01/31/2021    PAP SMEAR  01/04/2024    MAMMOGRAM  08/30/2024    COVID-19 Vaccine (6 - 2023-24 season) 09/01/2024    ANNUAL WELLNESS VISIT  09/12/2024    LIPID PANEL  09/27/2024    URINE MICROALBUMIN  09/27/2024    HEMOGLOBIN A1C  10/12/2024    BMI FOLLOWUP  10/25/2025    TDAP/TD VACCINES (3 - Td or Tdap) 02/20/2030    COLORECTAL CANCER SCREENING  04/18/2032    HEPATITIS C SCREENING  Completed    Pneumococcal Vaccine 0-64  Completed    INFLUENZA VACCINE  Completed    ZOSTER VACCINE  Completed                                                                                                                                                CMS Preventative Services Quick Reference  Risk Factors Identified During Encounter  Immunizations Discussed/Encouraged: Influenza and COVID19  Polypharmacy: Medication List reviewed    The above risks/problems have been discussed with the patient.  Pertinent information has been shared with the patient in the After Visit Summary.  An After Visit Summary and PPPS were made available to the patient.    Follow Up:   Next Medicare Wellness visit to be scheduled in 1 year.     Assessment & Plan  Need for immunization against influenza    Type 2 diabetes mellitus with hyperglycemia, with long-term current use of insulin    Essential hypertension    Hyperlipidemia, unspecified hyperlipidemia type     Medicare annual  wellness visit, subsequent    Depression screen    At low risk for fall    Encounter for screening mammogram for malignant neoplasm of breast    Obesity (BMI 30.0-34.9)  Patient's (Body mass index is 31.8 kg/m².) indicates that they are  with health conditions that include  . Weight is . BMI  . We discussed .     Orders Placed This Encounter   Procedures    Mammo Screening Digital Tomosynthesis Bilateral With CAD    Fluzone >6mos ([id number])    CBC (No Diff)    Comprehensive Metabolic Panel    Hemoglobin A1c    Lipid Panel    Microalbumin / Creatinine Urine Ratio - Urine, Clean Catch     New Medications Ordered This Visit   Medications    losartan (COZAAR) 50 MG tablet     Sig: Take 1 tablet by mouth Daily.     Dispense:  90 tablet     Refill:  3    traZODone (DESYREL) 50 MG tablet     Sig: Take 0.5 tablets by mouth Every Night.     Dispense:  45 tablet     Refill:  1    rosuvastatin (Crestor) 20 MG tablet     Sig: Take 1 tablet by mouth Daily.     Dispense:  90 tablet     Refill:  3    glimepiride (Amaryl) 4 MG tablet     Sig: Take 1 tablet by mouth Every Morning Before Breakfast.     Dispense:  90 tablet     Refill:  3    Insulin Lispro, 1 Unit Dial, (HumaLOG KwikPen) 100 UNIT/ML solution pen-injector     Sig: Inject 30 Units under the skin into the appropriate area as directed 3 (Three) Times a Day Before Meals.     Dispense:  30 mL     Refill:  5     I would recommend she get the COVID-19 vaccine although I would recommend she get this after her mammogram.  If she gets it prior to her mammogram she will need to wait 6 weeks to have the mammogram.  She notes understanding.    Blood work today.  Further workup or management depending on those results.    She received influenza vaccine and tolerated that well without immediate side effects or issues.    Follow Up:   Return in about 6 months (around 4/25/2025) for Recheck A1c.

## 2024-10-25 NOTE — ASSESSMENT & PLAN NOTE
Patient's (Body mass index is 31.8 kg/m².) indicates that they are  with health conditions that include  . Weight is . BMI  . We discussed .

## 2024-11-12 ENCOUNTER — HOSPITAL ENCOUNTER (OUTPATIENT)
Dept: MAMMOGRAPHY | Facility: HOSPITAL | Age: 64
Discharge: HOME | End: 2024-11-12
Admitting: INTERNAL MEDICINE
Payer: MEDICARE

## 2024-11-12 DIAGNOSIS — Z12.31 ENCOUNTER FOR SCREENING MAMMOGRAM FOR MALIGNANT NEOPLASM OF BREAST: ICD-10-CM

## 2024-11-12 PROCEDURE — 77067 SCR MAMMO BI INCL CAD: CPT

## 2024-11-12 PROCEDURE — 77063 BREAST TOMOSYNTHESIS BI: CPT

## 2025-01-03 DIAGNOSIS — Z79.4 TYPE 2 DIABETES MELLITUS WITH HYPERGLYCEMIA, WITH LONG-TERM CURRENT USE OF INSULIN: ICD-10-CM

## 2025-01-03 DIAGNOSIS — E11.65 TYPE 2 DIABETES MELLITUS WITH HYPERGLYCEMIA, WITH LONG-TERM CURRENT USE OF INSULIN: ICD-10-CM

## 2025-01-03 RX ORDER — EMPAGLIFLOZIN 25 MG/1
25 TABLET, FILM COATED ORAL DAILY
Qty: 30 TABLET | Refills: 5 | Status: SHIPPED | OUTPATIENT
Start: 2025-01-03

## 2025-01-03 NOTE — TELEPHONE ENCOUNTER
Rx Refill Note  Requested Prescriptions     Pending Prescriptions Disp Refills    Jardiance 25 MG tablet tablet [Pharmacy Med Name: Jardiance 25 MG Oral Tablet] 30 tablet 0     Sig: Take 1 tablet by mouth once daily      Last office visit with prescribing clinician: 10/25/2024   Last telemedicine visit with prescribing clinician: Visit date not found   Next office visit with prescribing clinician:  04/28/2025                        Would you like a call back once the refill request has been completed: [] Yes [] No    If the office needs to give you a call back, can they leave a voicemail: [] Yes [] No    JUAN Ward  01/03/25, 11:37 CST

## 2025-01-28 DIAGNOSIS — Z79.4 TYPE 2 DIABETES MELLITUS WITH HYPERGLYCEMIA, WITH LONG-TERM CURRENT USE OF INSULIN: ICD-10-CM

## 2025-01-28 DIAGNOSIS — E11.65 TYPE 2 DIABETES MELLITUS WITH HYPERGLYCEMIA, WITH LONG-TERM CURRENT USE OF INSULIN: ICD-10-CM

## 2025-01-28 RX ORDER — INSULIN GLARGINE 100 [IU]/ML
50 INJECTION, SOLUTION SUBCUTANEOUS 2 TIMES DAILY
Qty: 30 ML | Refills: 3 | Status: SHIPPED | OUTPATIENT
Start: 2025-01-28

## 2025-01-28 NOTE — TELEPHONE ENCOUNTER
Rx Refill Note  Requested Prescriptions     Pending Prescriptions Disp Refills    Lantus SoloStar 100 UNIT/ML injection pen [Pharmacy Med Name: Lantus SoloStar 100 UNIT/ML Subcutaneous Solution Pen-injector] 30 mL 0     Sig: INJECT 50 UNITS SUBCUTANEOUSLY TWICE DAILY      Last office visit with prescribing clinician: Visit date not found   Last telemedicine visit with prescribing clinician: Visit date not found   Next office visit with prescribing clinician: 4/28/2025                         Would you like a call back once the refill request has been completed: [] Yes [] No    If the office needs to give you a call back, can they leave a voicemail: [] Yes [] No    Karlo Sifuentes MA  01/28/25, 11:27 CST

## 2025-03-24 DIAGNOSIS — Z79.4 TYPE 2 DIABETES MELLITUS WITH HYPERGLYCEMIA, WITH LONG-TERM CURRENT USE OF INSULIN: ICD-10-CM

## 2025-03-24 DIAGNOSIS — E11.65 TYPE 2 DIABETES MELLITUS WITH HYPERGLYCEMIA, WITH LONG-TERM CURRENT USE OF INSULIN: ICD-10-CM

## 2025-03-24 RX ORDER — PROCHLORPERAZINE 25 MG/1
SUPPOSITORY RECTAL
Qty: 3 EACH | Refills: 3 | Status: SHIPPED | OUTPATIENT
Start: 2025-03-24

## 2025-03-24 NOTE — TELEPHONE ENCOUNTER
Rx Refill Note  Requested Prescriptions     Pending Prescriptions Disp Refills    Continuous Glucose Sensor (Dexcom G6 Sensor) [Pharmacy Med Name: DEXCOM G6 SENSOR    MIS] 3 each 0     Sig: USE 1 SENSOR EVERY 10 DAYS      Last office visit with prescribing clinician: 10/25/2024   Last telemedicine visit with prescribing clinician: Visit date not found   Next office visit with prescribing clinician: Visit date not found                         Would you like a call back once the refill request has been completed: [] Yes [] No    If the office needs to give you a call back, can they leave a voicemail: [] Yes [] No    Karlo Sifuentes MA  03/24/25, 15:27 CDT

## 2025-04-28 ENCOUNTER — OFFICE VISIT (OUTPATIENT)
Dept: INTERNAL MEDICINE | Facility: CLINIC | Age: 65
End: 2025-04-28
Payer: MEDICARE

## 2025-04-28 ENCOUNTER — TELEPHONE (OUTPATIENT)
Dept: INTERNAL MEDICINE | Facility: CLINIC | Age: 65
End: 2025-04-28
Payer: MEDICARE

## 2025-04-28 VITALS
HEART RATE: 92 BPM | DIASTOLIC BLOOD PRESSURE: 86 MMHG | BODY MASS INDEX: 31.66 KG/M2 | SYSTOLIC BLOOD PRESSURE: 134 MMHG | WEIGHT: 197 LBS | HEIGHT: 66 IN | OXYGEN SATURATION: 98 %

## 2025-04-28 DIAGNOSIS — Z79.4 TYPE 2 DIABETES MELLITUS WITH HYPERGLYCEMIA, WITH LONG-TERM CURRENT USE OF INSULIN: Primary | ICD-10-CM

## 2025-04-28 DIAGNOSIS — E11.65 TYPE 2 DIABETES MELLITUS WITH HYPERGLYCEMIA, WITH LONG-TERM CURRENT USE OF INSULIN: Primary | ICD-10-CM

## 2025-04-28 DIAGNOSIS — J30.2 SEASONAL ALLERGIES: ICD-10-CM

## 2025-04-28 DIAGNOSIS — Z79.4 TYPE 2 DIABETES MELLITUS WITH HYPERGLYCEMIA, WITH LONG-TERM CURRENT USE OF INSULIN: ICD-10-CM

## 2025-04-28 DIAGNOSIS — E11.65 TYPE 2 DIABETES MELLITUS WITH HYPERGLYCEMIA, WITH LONG-TERM CURRENT USE OF INSULIN: ICD-10-CM

## 2025-04-28 LAB
EXPIRATION DATE: ABNORMAL
HBA1C MFR BLD: 7.4 % (ref 4.5–5.7)
Lab: ABNORMAL

## 2025-04-28 RX ORDER — LORATADINE 10 MG/1
10 TABLET ORAL DAILY
Qty: 90 TABLET | Refills: 3 | Status: SHIPPED | OUTPATIENT
Start: 2025-04-28

## 2025-04-28 RX ORDER — PROCHLORPERAZINE 25 MG/1
1 SUPPOSITORY RECTAL SEE ADMIN INSTRUCTIONS
Qty: 1 EACH | Refills: 3 | Status: SHIPPED | OUTPATIENT
Start: 2025-04-28

## 2025-04-28 NOTE — TELEPHONE ENCOUNTER
Caller: Soledad aRm    Relationship: Self    Best call back number:     [phone] (Mobile)     Who are you requesting to speak with (clinical staff, provider,  specific staff member): CLINICAL    What was the call regarding: THE PATIENT STATES THE MOUNJARO IS OVER $200 WITH HER INSURANCE. THE PATIENT STATES THAT SHE NEEDS AN ALTERNATIVE.  PLEASE ADVISE.

## 2025-04-28 NOTE — TELEPHONE ENCOUNTER
Called pharmacy. They said it will be about the same as the pt has a deductible they have to meet first..      Called pt and let her know. Also told her she can call her insurance and find out what her out of pocket would end up being. She v/u

## 2025-04-28 NOTE — TELEPHONE ENCOUNTER
Can you please contact the pharmacy and find out if Ozempic or Trulicity are less expensive?  Thank you

## 2025-04-28 NOTE — PROGRESS NOTES
"Chief Complaint   Patient presents with    Diabetes    Headache     \"Sinus headache\"        History:      Soledad Ram is a 65 y.o. female who presents today for evaluation of the above problems.      HPI  History of Present Illness  The patient is a 65-year-old female who presents for a 6-month follow-up of diabetes and sinus headache.    Blood glucose levels are diligently monitored, typically ranging from 80 to 120 in the morning, as indicated by her G6 device. Postprandial readings have been observed to be between 150 and 200. Her A1c is currently 7.4, slightly up from 7.3 at the last visit. Adherence to a diabetic diet is reported, with the exception of consuming a donut and coffee on Sunday mornings. Current medication regimen includes Lantus Solostar 50 units administered twice daily, mealtime insulin at a dosage of 30 units three times daily, Jardiance 25 mg taken once daily, and Amaryl 4 mg also taken once daily. No personal or family history of pancreatitis or thyroid cancer is reported.    A persistent sinus headache has been experienced for the past 2 weeks, described as constant. No associated symptoms such as cold, cough, fever, or drainage are reported. Caffeine intake is minimal, opting for decaffeinated tea and coffee. Symptom management has included Flonase and ibuprofen.  Has previously used Zyrtec but stopped this medication due to perceived inefficacy.    FAMILY HISTORY  She has a family history of diabetes.      ROS:  Review of Systems   HENT:  Positive for sinus pressure. Negative for congestion, ear pain and postnasal drip.    Respiratory:  Negative for chest tightness and shortness of breath.    Cardiovascular:  Negative for chest pain and palpitations.         Current Outpatient Medications:     albuterol sulfate  (90 Base) MCG/ACT inhaler, Inhale 2 puffs Every 4 (Four) Hours As Needed for Wheezing or Shortness of Air (rinse mouth after use)., Disp: 6.7 g, Rfl: 0    Continuous " Blood Gluc Transmit (Dexcom G6 Transmitter) misc, Use 1 application Every 10 (Ten) Days., Disp: 1 each, Rfl: 5    Continuous Glucose  (Dexcom G6 ) device, Inject 1 each under the skin into the appropriate area as directed See Admin Instructions., Disp: 1 each, Rfl: 3    Continuous Glucose Sensor (Dexcom G6 Sensor), Inject  under the skin into the appropriate area as directed Every 10 (Ten) Days., Disp: 3 each, Rfl: 3    cyclobenzaprine (FLEXERIL) 10 MG tablet, Take 1 tablet by mouth 2 (Two) Times a Day As Needed for Muscle Spasms., Disp: , Rfl:     DULoxetine (CYMBALTA) 60 MG capsule, Take 1 capsule by mouth 2 (Two) Times a Day., Disp: , Rfl:     empagliflozin (Jardiance) 25 MG tablet tablet, Take 1 tablet by mouth once daily, Disp: 30 tablet, Rfl: 5    fluticasone (Flonase) 50 MCG/ACT nasal spray, 2 sprays into the nostril(s) as directed by provider Daily., Disp: 16 g, Rfl: 0    gabapentin (NEURONTIN) 600 MG tablet, Take 1 tablet by mouth Every 8 (Eight) Hours As Needed., Disp: , Rfl:     glimepiride (Amaryl) 4 MG tablet, Take 1 tablet by mouth Every Morning Before Breakfast., Disp: 90 tablet, Rfl: 3    Insulin Lispro, 1 Unit Dial, (HumaLOG KwikPen) 100 UNIT/ML solution pen-injector, Inject 30 Units under the skin into the appropriate area as directed 3 (Three) Times a Day Before Meals., Disp: 30 mL, Rfl: 5    Lantus SoloStar 100 UNIT/ML injection pen, Inject 50 Units under the skin into the appropriate area as directed 2 (Two) Times a Day., Disp: 30 mL, Rfl: 3    losartan (COZAAR) 50 MG tablet, Take 1 tablet by mouth Daily., Disp: 90 tablet, Rfl: 3    meclizine (ANTIVERT) 25 MG tablet, Take 1 tablet by mouth 3 (Three) Times a Day As Needed for Dizziness., Disp: 30 tablet, Rfl: 0    oxyCODONE-acetaminophen (PERCOCET)  MG per tablet, Take 1 tablet by mouth Every 6 (Six) Hours As Needed for Moderate Pain., Disp: , Rfl:     polyethylene glycol (MIRALAX) 17 g packet, Take 17 g by mouth Daily.,  Disp: 30 packet, Rfl: 5    rosuvastatin (Crestor) 20 MG tablet, Take 1 tablet by mouth Daily., Disp: 90 tablet, Rfl: 3    busPIRone (BUSPAR) 10 MG tablet, Take 2 tablets by mouth 3 (Three) Times a Day. (Patient not taking: Reported on 4/28/2025), Disp: 180 tablet, Rfl: 5    Cinnamon 500 MG capsule, Take 1 capsule by mouth 2 (Two) Times a Day., Disp: , Rfl:     loratadine (Claritin) 10 MG tablet, Take 1 tablet by mouth Daily., Disp: 90 tablet, Rfl: 3    Tirzepatide 2.5 MG/0.5ML solution auto-injector, Inject 2.5 mg under the skin into the appropriate area as directed 1 (One) Time Per Week., Disp: 2 mL, Rfl: 1    traZODone (DESYREL) 50 MG tablet, Take 0.5 tablets by mouth Every Night., Disp: 45 tablet, Rfl: 1    Lab Results   Component Value Date    GLUCOSE 78 10/25/2024    BUN 14 10/25/2024    CREATININE 0.61 10/25/2024     10/25/2024    K 4.2 10/25/2024     (H) 10/25/2024    CALCIUM 9.7 10/25/2024    PROTEINTOT 7.1 10/25/2024    ALBUMIN 4.2 10/25/2024    ALT 24 10/25/2024    AST 35 (H) 10/25/2024    ALKPHOS 106 10/25/2024    BILITOT 0.4 10/25/2024    GLOB 2.9 10/25/2024    AGRATIO 1.4 10/25/2024    BCR 23.0 10/25/2024    ANIONGAP 7.0 10/25/2024    EGFR 101.2 10/25/2024       WBC   Date Value Ref Range Status   10/25/2024 10.30 3.40 - 10.80 10*3/mm3 Final     RBC   Date Value Ref Range Status   10/25/2024 5.12 3.77 - 5.28 10*6/mm3 Final     Hemoglobin   Date Value Ref Range Status   10/25/2024 15.9 12.0 - 15.9 g/dL Final     Hematocrit   Date Value Ref Range Status   10/25/2024 46.0 34.0 - 46.6 % Final     MCV   Date Value Ref Range Status   10/25/2024 89.8 79.0 - 97.0 fL Final     MCH   Date Value Ref Range Status   10/25/2024 31.1 26.6 - 33.0 pg Final     MCHC   Date Value Ref Range Status   10/25/2024 34.6 31.5 - 35.7 g/dL Final     RDW   Date Value Ref Range Status   10/25/2024 13.2 12.3 - 15.4 % Final     RDW-SD   Date Value Ref Range Status   10/25/2024 43.0 37.0 - 54.0 fl Final     MPV   Date Value  "Ref Range Status   10/25/2024 9.9 6.0 - 12.0 fL Final     Platelets   Date Value Ref Range Status   10/25/2024 279 140 - 450 10*3/mm3 Final     Neutrophil %   Date Value Ref Range Status   02/23/2022 52.6 42.7 - 76.0 % Final     Lymphocyte %   Date Value Ref Range Status   02/23/2022 27.5 19.6 - 45.3 % Final     Monocyte %   Date Value Ref Range Status   02/23/2022 8.0 5.0 - 12.0 % Final     Eosinophil %   Date Value Ref Range Status   02/23/2022 10.4 (H) 0.3 - 6.2 % Final     Basophil %   Date Value Ref Range Status   02/23/2022 1.2 0.0 - 1.5 % Final     Immature Grans %   Date Value Ref Range Status   02/23/2022 0.3 0.0 - 0.5 % Final     Neutrophils, Absolute   Date Value Ref Range Status   02/23/2022 5.31 1.70 - 7.00 10*3/mm3 Final     Lymphocytes, Absolute   Date Value Ref Range Status   02/23/2022 2.78 0.70 - 3.10 10*3/mm3 Final     Monocytes, Absolute   Date Value Ref Range Status   02/23/2022 0.81 0.10 - 0.90 10*3/mm3 Final     Eosinophils, Absolute   Date Value Ref Range Status   02/23/2022 1.05 (H) 0.00 - 0.40 10*3/mm3 Final     Basophils, Absolute   Date Value Ref Range Status   02/23/2022 0.12 0.00 - 0.20 10*3/mm3 Final     Immature Grans, Absolute   Date Value Ref Range Status   02/23/2022 0.03 0.00 - 0.05 10*3/mm3 Final     nRBC   Date Value Ref Range Status   02/23/2022 0.0 0.0 - 0.2 /100 WBC Final       OBJECTIVE:  Visit Vitals  /86 (BP Location: Left arm, Patient Position: Sitting, Cuff Size: Adult)   Pulse 92   Ht 167.6 cm (66\")   Wt 89.4 kg (197 lb)   SpO2 98%   BMI 31.80 kg/m²      Physical Exam  Constitutional:       Appearance: Normal appearance.   HENT:      Head: Normocephalic.     Cardiovascular:      Rate and Rhythm: Normal rate and regular rhythm.   Pulmonary:      Effort: Pulmonary effort is normal.      Breath sounds: Normal breath sounds.   Skin:     General: Skin is warm and dry.   Neurological:      Mental Status: She is alert.   Psychiatric:         Mood and Affect: Mood normal. "         Behavior: Behavior normal.         Thought Content: Thought content normal.         Judgment: Judgment normal.       Physical Exam  Respiratory: Clear to auscultation, no wheezing, rales or rhonchi    Results  Labs   - A1c: 7.4    Assessment/Plan    Diagnoses and all orders for this visit:    1. Type 2 diabetes mellitus with hyperglycemia, with long-term current use of insulin (Primary)  -     POC Glycosylated Hemoglobin (Hb A1C)  -     Tirzepatide 2.5 MG/0.5ML solution auto-injector; Inject 2.5 mg under the skin into the appropriate area as directed 1 (One) Time Per Week.  Dispense: 2 mL; Refill: 1    2. Seasonal allergies  -     loratadine (Claritin) 10 MG tablet; Take 1 tablet by mouth Daily.  Dispense: 90 tablet; Refill: 3      Assessment & Plan  1. Diabetes Mellitus.  - A1c level is currently at 7.4, slightly elevated from the previous reading of 7.3.  - Blood glucose levels are generally well-managed, with occasional postprandial spikes.  - The potential side effects of Mounjaro, including constipation and acid reflux, were discussed. She was advised to consume smaller meals and avoid eating within 3 hours of lying down to manage potential acid reflux. The importance of a balanced diet, particularly focusing on protein and vegetable intake, was emphasized. She was also advised to avoid greasy foods to prevent nausea.  - A prescription for Mounjaro will be initiated at a starting dose of 2.5 mg for a duration of 4 weeks, with subsequent dose adjustments based on tolerance and response. The prescription will be sent to [facility] pharmacy. If she experiences constipation, MiraLAX is recommended. If she tolerates the initial dose well, the dosage will be increased after the first 4 weeks.    2. Sinus Headache.  - Symptoms suggest a sinus headache, likely due to allergies.  - Physical exam findings indicate no fever, drainage, or significant cough.  - She was advised to take a combination of 500 mg  Tylenol and 800 mg ibuprofen, along with caffeine, to alleviate the headache.  - A prescription for Claritin will be provided, with a 90-day supply and refills available as needed.    Follow-up  - Follow-up appointment scheduled in 6 months.      Return in about 6 months (around 10/28/2025).      MARTY Sexton  09:30 CDT  4/28/2025   Electronically signed    Patient or patient representative verbalized consent for the use of Ambient Listening during the visit with  MARTY Sexton for chart documentation. 4/28/2025  11:37 CDT

## 2025-04-28 NOTE — TELEPHONE ENCOUNTER
Rx Refill Note  Requested Prescriptions     Pending Prescriptions Disp Refills    Continuous Glucose Transmitter (Dexcom G6 Transmitter) misc [Pharmacy Med Name: DEXCOM G6 TRANSMIT  MIS] 1 each 0     Sig: USE AS DIRECTED      Last office visit with prescribing clinician: 10/25/2024   Last telemedicine visit with prescribing clinician: Visit date not found   Next office visit with prescribing clinician: 10/29/2025                         Would you like a call back once the refill request has been completed: [] Yes [] No    If the office needs to give you a call back, can they leave a voicemail: [] Yes [] No    Karlo Sifuentes MA  04/28/25, 14:49 CDT

## 2025-06-11 DIAGNOSIS — E11.65 TYPE 2 DIABETES MELLITUS WITH HYPERGLYCEMIA, WITH LONG-TERM CURRENT USE OF INSULIN: ICD-10-CM

## 2025-06-11 DIAGNOSIS — Z79.4 TYPE 2 DIABETES MELLITUS WITH HYPERGLYCEMIA, WITH LONG-TERM CURRENT USE OF INSULIN: ICD-10-CM

## 2025-06-11 RX ORDER — INSULIN LISPRO 100 [IU]/ML
INJECTION, SOLUTION INTRAVENOUS; SUBCUTANEOUS
Qty: 30 ML | Refills: 5 | Status: SHIPPED | OUTPATIENT
Start: 2025-06-11

## 2025-06-27 DIAGNOSIS — Z79.4 TYPE 2 DIABETES MELLITUS WITH HYPERGLYCEMIA, WITH LONG-TERM CURRENT USE OF INSULIN: ICD-10-CM

## 2025-06-27 DIAGNOSIS — E11.65 TYPE 2 DIABETES MELLITUS WITH HYPERGLYCEMIA, WITH LONG-TERM CURRENT USE OF INSULIN: ICD-10-CM

## 2025-06-27 NOTE — TELEPHONE ENCOUNTER
Rx Refill Note  Requested Prescriptions     Pending Prescriptions Disp Refills    Jardiance 25 MG tablet tablet [Pharmacy Med Name: Jardiance 25 MG Oral Tablet] 30 tablet 0     Sig: Take 1 tablet by mouth once daily      Last office visit with prescribing clinician: 4/28/2025   Last telemedicine visit with prescribing clinician: Visit date not found   Next office visit with prescribing clinician: Visit date not found                         Would you like a call back once the refill request has been completed: [] Yes [] No    If the office needs to give you a call back, can they leave a voicemail: [] Yes [] No    Karlo Sifuentes MA  06/27/25, 11:32 CDT

## 2025-06-29 DIAGNOSIS — E11.65 TYPE 2 DIABETES MELLITUS WITH HYPERGLYCEMIA, WITH LONG-TERM CURRENT USE OF INSULIN: ICD-10-CM

## 2025-06-29 DIAGNOSIS — Z79.4 TYPE 2 DIABETES MELLITUS WITH HYPERGLYCEMIA, WITH LONG-TERM CURRENT USE OF INSULIN: ICD-10-CM

## 2025-06-30 RX ORDER — INSULIN GLARGINE 100 [IU]/ML
INJECTION, SOLUTION SUBCUTANEOUS
Qty: 30 ML | Refills: 3 | Status: SHIPPED | OUTPATIENT
Start: 2025-06-30

## 2025-07-23 DIAGNOSIS — Z79.4 TYPE 2 DIABETES MELLITUS WITH HYPERGLYCEMIA, WITH LONG-TERM CURRENT USE OF INSULIN: ICD-10-CM

## 2025-07-23 DIAGNOSIS — E11.65 TYPE 2 DIABETES MELLITUS WITH HYPERGLYCEMIA, WITH LONG-TERM CURRENT USE OF INSULIN: ICD-10-CM

## 2025-07-23 RX ORDER — PROCHLORPERAZINE 25 MG/1
SUPPOSITORY RECTAL
Qty: 3 EACH | Refills: 5 | Status: SHIPPED | OUTPATIENT
Start: 2025-07-23

## 2025-07-23 NOTE — TELEPHONE ENCOUNTER
Rx Refill Note  Requested Prescriptions     Pending Prescriptions Disp Refills    Continuous Glucose Sensor (Dexcom G6 Sensor) [Pharmacy Med Name: DEXCOM G6 SENSOR    MIS] 3 each 0     Sig: USE AS DIRECTED EVERY  10  DAYS      Last office visit with prescribing clinician: 10/25/2024   Last telemedicine visit with prescribing clinician: Visit date not found   Next office visit with prescribing clinician: 10/29/2025                         Would you like a call back once the refill request has been completed: [] Yes [] No    If the office needs to give you a call back, can they leave a voicemail: [] Yes [] No    JUAN Ward  07/23/25, 10:08 CDT

## 2025-08-21 DIAGNOSIS — Z79.4 TYPE 2 DIABETES MELLITUS WITH HYPERGLYCEMIA, WITH LONG-TERM CURRENT USE OF INSULIN: ICD-10-CM

## 2025-08-21 DIAGNOSIS — E11.65 TYPE 2 DIABETES MELLITUS WITH HYPERGLYCEMIA, WITH LONG-TERM CURRENT USE OF INSULIN: ICD-10-CM

## 2025-08-21 RX ORDER — PROCHLORPERAZINE 25 MG/1
1 SUPPOSITORY RECTAL SEE ADMIN INSTRUCTIONS
Qty: 1 EACH | Refills: 3 | OUTPATIENT
Start: 2025-08-21

## 2025-08-26 ENCOUNTER — TELEPHONE (OUTPATIENT)
Dept: INTERNAL MEDICINE | Facility: CLINIC | Age: 65
End: 2025-08-26
Payer: MEDICARE

## (undated) DEVICE — YANKAUER,BULB TIP WITH VENT: Brand: ARGYLE

## (undated) DEVICE — SENSR O2 OXIMAX FNGR A/ 18IN NONSTR

## (undated) DEVICE — TBG SMPL FLTR LINE NASL 02/C02 A/ BX/100

## (undated) DEVICE — MASK,OXYGEN,MED CONC,ADLT,7' TUB, UC: Brand: PENDING

## (undated) DEVICE — THE CHANNEL CLEANING BRUSH IS A NYLON FLEXI BRUSH ATTACHED TO A FLEXIBLE PLASTIC SHEATH DESIGNED TO SAFELY REMOVE DEBRIS FROM FLEXIBLE ENDOSCOPES.

## (undated) DEVICE — Device: Brand: DEFENDO AIR/WATER/SUCTION AND BIOPSY VALVE